# Patient Record
Sex: MALE | Race: WHITE | Employment: OTHER | RURAL
[De-identification: names, ages, dates, MRNs, and addresses within clinical notes are randomized per-mention and may not be internally consistent; named-entity substitution may affect disease eponyms.]

---

## 2017-01-19 RX ORDER — ALPRAZOLAM 2 MG/1
2 TABLET ORAL 2 TIMES DAILY PRN
Qty: 60 TABLET | Refills: 0 | Status: SHIPPED | OUTPATIENT
Start: 2017-01-19 | End: 2017-02-17 | Stop reason: SDUPTHER

## 2017-01-25 DIAGNOSIS — F41.9 ANXIETY: ICD-10-CM

## 2017-01-25 RX ORDER — PRAVASTATIN SODIUM 80 MG/1
TABLET ORAL
Qty: 30 TABLET | Refills: 5 | Status: SHIPPED | OUTPATIENT
Start: 2017-01-25 | End: 2017-05-18 | Stop reason: SDUPTHER

## 2017-01-25 RX ORDER — PRAVASTATIN SODIUM 40 MG
TABLET ORAL
Qty: 30 TABLET | Refills: 5 | Status: SHIPPED | OUTPATIENT
Start: 2017-01-25 | End: 2017-01-25 | Stop reason: SDUPTHER

## 2017-02-08 ENCOUNTER — OFFICE VISIT (OUTPATIENT)
Dept: PRIMARY CARE CLINIC | Age: 67
End: 2017-02-08
Payer: MEDICARE

## 2017-02-08 VITALS
HEART RATE: 114 BPM | SYSTOLIC BLOOD PRESSURE: 130 MMHG | OXYGEN SATURATION: 93 % | DIASTOLIC BLOOD PRESSURE: 70 MMHG | BODY MASS INDEX: 23.56 KG/M2 | WEIGHT: 150.4 LBS

## 2017-02-08 DIAGNOSIS — M54.9 CHRONIC BACK PAIN, UNSPECIFIED BACK LOCATION, UNSPECIFIED BACK PAIN LATERALITY: ICD-10-CM

## 2017-02-08 DIAGNOSIS — K21.9 GASTROESOPHAGEAL REFLUX DISEASE WITHOUT ESOPHAGITIS: ICD-10-CM

## 2017-02-08 DIAGNOSIS — F41.9 ANXIETY: Primary | ICD-10-CM

## 2017-02-08 DIAGNOSIS — E78.5 HYPERLIPIDEMIA, UNSPECIFIED HYPERLIPIDEMIA TYPE: ICD-10-CM

## 2017-02-08 DIAGNOSIS — G89.29 CHRONIC BACK PAIN, UNSPECIFIED BACK LOCATION, UNSPECIFIED BACK PAIN LATERALITY: ICD-10-CM

## 2017-02-08 PROCEDURE — G8427 DOCREV CUR MEDS BY ELIG CLIN: HCPCS | Performed by: NURSE PRACTITIONER

## 2017-02-08 PROCEDURE — G8598 ASA/ANTIPLAT THER USED: HCPCS | Performed by: NURSE PRACTITIONER

## 2017-02-08 PROCEDURE — 3017F COLORECTAL CA SCREEN DOC REV: CPT | Performed by: NURSE PRACTITIONER

## 2017-02-08 PROCEDURE — 99213 OFFICE O/P EST LOW 20 MIN: CPT | Performed by: NURSE PRACTITIONER

## 2017-02-08 PROCEDURE — G8420 CALC BMI NORM PARAMETERS: HCPCS | Performed by: NURSE PRACTITIONER

## 2017-02-08 PROCEDURE — 1123F ACP DISCUSS/DSCN MKR DOCD: CPT | Performed by: NURSE PRACTITIONER

## 2017-02-08 PROCEDURE — G8484 FLU IMMUNIZE NO ADMIN: HCPCS | Performed by: NURSE PRACTITIONER

## 2017-02-08 PROCEDURE — 1036F TOBACCO NON-USER: CPT | Performed by: NURSE PRACTITIONER

## 2017-02-08 PROCEDURE — 4040F PNEUMOC VAC/ADMIN/RCVD: CPT | Performed by: NURSE PRACTITIONER

## 2017-02-08 RX ORDER — TRAZODONE HYDROCHLORIDE 100 MG/1
TABLET ORAL
Qty: 60 TABLET | Refills: 5 | Status: SHIPPED | OUTPATIENT
Start: 2017-02-08 | End: 2017-03-17 | Stop reason: SDUPTHER

## 2017-02-08 RX ORDER — PAROXETINE HYDROCHLORIDE 20 MG/1
20 TABLET, FILM COATED ORAL DAILY
Qty: 30 TABLET | Refills: 5 | Status: SHIPPED | OUTPATIENT
Start: 2017-02-08 | End: 2017-03-08 | Stop reason: SDUPTHER

## 2017-02-08 RX ORDER — GABAPENTIN 800 MG/1
800 TABLET ORAL 4 TIMES DAILY
Qty: 120 TABLET | Refills: 5 | Status: SHIPPED | OUTPATIENT
Start: 2017-02-08 | End: 2017-06-05 | Stop reason: SDUPTHER

## 2017-02-08 ASSESSMENT — ENCOUNTER SYMPTOMS
GASTROINTESTINAL NEGATIVE: 1
EYES NEGATIVE: 1
RESPIRATORY NEGATIVE: 1

## 2017-02-17 RX ORDER — ALPRAZOLAM 2 MG/1
2 TABLET ORAL 2 TIMES DAILY PRN
Qty: 60 TABLET | Refills: 0 | Status: SHIPPED | OUTPATIENT
Start: 2017-02-17 | End: 2017-03-17 | Stop reason: SDUPTHER

## 2017-02-26 ASSESSMENT — ENCOUNTER SYMPTOMS
SORE THROAT: 0
EYE PAIN: 0
SHORTNESS OF BREATH: 0
NAUSEA: 0
COUGH: 0
BACK PAIN: 1
ABDOMINAL PAIN: 0
VOMITING: 0

## 2017-03-08 ENCOUNTER — OFFICE VISIT (OUTPATIENT)
Dept: PRIMARY CARE CLINIC | Age: 67
End: 2017-03-08
Payer: MEDICARE

## 2017-03-08 VITALS
SYSTOLIC BLOOD PRESSURE: 124 MMHG | BODY MASS INDEX: 23.18 KG/M2 | HEART RATE: 104 BPM | OXYGEN SATURATION: 96 % | WEIGHT: 148 LBS | DIASTOLIC BLOOD PRESSURE: 70 MMHG

## 2017-03-08 DIAGNOSIS — R19.7 DIARRHEA, UNSPECIFIED TYPE: ICD-10-CM

## 2017-03-08 DIAGNOSIS — F41.9 ANXIETY: Primary | ICD-10-CM

## 2017-03-08 DIAGNOSIS — J20.9 ACUTE BRONCHITIS, UNSPECIFIED ORGANISM: ICD-10-CM

## 2017-03-08 PROCEDURE — G8598 ASA/ANTIPLAT THER USED: HCPCS | Performed by: NURSE PRACTITIONER

## 2017-03-08 PROCEDURE — 1036F TOBACCO NON-USER: CPT | Performed by: NURSE PRACTITIONER

## 2017-03-08 PROCEDURE — 99213 OFFICE O/P EST LOW 20 MIN: CPT | Performed by: NURSE PRACTITIONER

## 2017-03-08 PROCEDURE — G8484 FLU IMMUNIZE NO ADMIN: HCPCS | Performed by: NURSE PRACTITIONER

## 2017-03-08 PROCEDURE — 4040F PNEUMOC VAC/ADMIN/RCVD: CPT | Performed by: NURSE PRACTITIONER

## 2017-03-08 PROCEDURE — 1123F ACP DISCUSS/DSCN MKR DOCD: CPT | Performed by: NURSE PRACTITIONER

## 2017-03-08 PROCEDURE — G8420 CALC BMI NORM PARAMETERS: HCPCS | Performed by: NURSE PRACTITIONER

## 2017-03-08 PROCEDURE — G8427 DOCREV CUR MEDS BY ELIG CLIN: HCPCS | Performed by: NURSE PRACTITIONER

## 2017-03-08 PROCEDURE — 3017F COLORECTAL CA SCREEN DOC REV: CPT | Performed by: NURSE PRACTITIONER

## 2017-03-08 RX ORDER — CIPROFLOXACIN 500 MG/1
500 TABLET, FILM COATED ORAL 2 TIMES DAILY
Qty: 20 TABLET | Refills: 0 | Status: SHIPPED | OUTPATIENT
Start: 2017-03-08 | End: 2017-03-17 | Stop reason: ALTCHOICE

## 2017-03-08 RX ORDER — PAROXETINE HYDROCHLORIDE 20 MG/1
20 TABLET, FILM COATED ORAL DAILY
Qty: 30 TABLET | Refills: 5 | Status: SHIPPED | OUTPATIENT
Start: 2017-03-08 | End: 2017-08-02 | Stop reason: SDUPTHER

## 2017-03-08 RX ORDER — ALBUTEROL SULFATE 90 UG/1
AEROSOL, METERED RESPIRATORY (INHALATION)
Qty: 8.5 G | Refills: 5 | Status: SHIPPED | OUTPATIENT
Start: 2017-03-08 | End: 2017-08-02 | Stop reason: SDUPTHER

## 2017-03-08 RX ORDER — BENZONATATE 200 MG/1
200 CAPSULE ORAL 3 TIMES DAILY PRN
Qty: 30 CAPSULE | Refills: 2 | Status: SHIPPED | OUTPATIENT
Start: 2017-03-08 | End: 2017-03-15

## 2017-03-08 ASSESSMENT — ENCOUNTER SYMPTOMS
SHORTNESS OF BREATH: 0
COUGH: 1
WHEEZING: 1
DIARRHEA: 1
EYES NEGATIVE: 1
VOMITING: 0
SORE THROAT: 0
EYE PAIN: 0
NAUSEA: 0
ABDOMINAL PAIN: 0

## 2017-03-17 ENCOUNTER — OFFICE VISIT (OUTPATIENT)
Dept: PRIMARY CARE CLINIC | Age: 67
End: 2017-03-17
Payer: MEDICARE

## 2017-03-17 VITALS
HEART RATE: 100 BPM | WEIGHT: 147.4 LBS | OXYGEN SATURATION: 98 % | RESPIRATION RATE: 20 BRPM | DIASTOLIC BLOOD PRESSURE: 70 MMHG | HEIGHT: 67 IN | SYSTOLIC BLOOD PRESSURE: 120 MMHG | BODY MASS INDEX: 23.13 KG/M2

## 2017-03-17 DIAGNOSIS — F41.9 ANXIETY: Primary | ICD-10-CM

## 2017-03-17 DIAGNOSIS — G47.00 INSOMNIA, UNSPECIFIED TYPE: ICD-10-CM

## 2017-03-17 PROCEDURE — 1123F ACP DISCUSS/DSCN MKR DOCD: CPT | Performed by: NURSE PRACTITIONER

## 2017-03-17 PROCEDURE — G8484 FLU IMMUNIZE NO ADMIN: HCPCS | Performed by: NURSE PRACTITIONER

## 2017-03-17 PROCEDURE — 4040F PNEUMOC VAC/ADMIN/RCVD: CPT | Performed by: NURSE PRACTITIONER

## 2017-03-17 PROCEDURE — G8427 DOCREV CUR MEDS BY ELIG CLIN: HCPCS | Performed by: NURSE PRACTITIONER

## 2017-03-17 PROCEDURE — 99213 OFFICE O/P EST LOW 20 MIN: CPT | Performed by: NURSE PRACTITIONER

## 2017-03-17 PROCEDURE — 1036F TOBACCO NON-USER: CPT | Performed by: NURSE PRACTITIONER

## 2017-03-17 PROCEDURE — 3017F COLORECTAL CA SCREEN DOC REV: CPT | Performed by: NURSE PRACTITIONER

## 2017-03-17 PROCEDURE — G8420 CALC BMI NORM PARAMETERS: HCPCS | Performed by: NURSE PRACTITIONER

## 2017-03-17 PROCEDURE — G8598 ASA/ANTIPLAT THER USED: HCPCS | Performed by: NURSE PRACTITIONER

## 2017-03-17 RX ORDER — ALPRAZOLAM 2 MG/1
2 TABLET ORAL 2 TIMES DAILY PRN
Qty: 60 TABLET | Refills: 0 | Status: SHIPPED | OUTPATIENT
Start: 2017-03-17 | End: 2017-04-13 | Stop reason: SDUPTHER

## 2017-03-17 RX ORDER — TRAZODONE HYDROCHLORIDE 50 MG/1
50 TABLET ORAL NIGHTLY
Qty: 14 TABLET | Refills: 0 | Status: SHIPPED | OUTPATIENT
Start: 2017-03-17 | End: 2017-05-18 | Stop reason: ALTCHOICE

## 2017-03-17 RX ORDER — BENZONATATE 200 MG/1
CAPSULE ORAL
COMMUNITY
Start: 2017-03-08 | End: 2017-03-17 | Stop reason: ALTCHOICE

## 2017-03-20 ASSESSMENT — ENCOUNTER SYMPTOMS
COUGH: 0
DIARRHEA: 0
SHORTNESS OF BREATH: 0
SORE THROAT: 0
NAUSEA: 0
VOMITING: 0
ABDOMINAL PAIN: 0
EYE PAIN: 0

## 2017-04-13 RX ORDER — ALPRAZOLAM 2 MG/1
2 TABLET ORAL 2 TIMES DAILY PRN
Qty: 60 TABLET | Refills: 0 | Status: SHIPPED | OUTPATIENT
Start: 2017-04-13 | End: 2017-05-11 | Stop reason: SDUPTHER

## 2017-05-11 RX ORDER — ALPRAZOLAM 2 MG/1
TABLET ORAL
Qty: 60 TABLET | Refills: 0 | Status: SHIPPED | OUTPATIENT
Start: 2017-05-11 | End: 2017-05-12 | Stop reason: SDUPTHER

## 2017-05-12 RX ORDER — ALPRAZOLAM 2 MG/1
TABLET ORAL
Qty: 60 TABLET | Refills: 0 | Status: SHIPPED | OUTPATIENT
Start: 2017-05-12 | End: 2017-06-12 | Stop reason: SDUPTHER

## 2017-05-18 ENCOUNTER — TELEPHONE (OUTPATIENT)
Dept: PRIMARY CARE CLINIC | Age: 67
End: 2017-05-18

## 2017-05-18 ENCOUNTER — HOSPITAL ENCOUNTER (OUTPATIENT)
Dept: OTHER | Age: 67
Discharge: HOME | End: 2017-05-18
Attending: NURSE PRACTITIONER | Admitting: NURSE PRACTITIONER

## 2017-05-18 ENCOUNTER — OFFICE VISIT (OUTPATIENT)
Dept: PRIMARY CARE CLINIC | Age: 67
End: 2017-05-18
Payer: MEDICARE

## 2017-05-18 VITALS
SYSTOLIC BLOOD PRESSURE: 110 MMHG | HEART RATE: 91 BPM | OXYGEN SATURATION: 97 % | WEIGHT: 155 LBS | DIASTOLIC BLOOD PRESSURE: 60 MMHG | BODY MASS INDEX: 24.28 KG/M2

## 2017-05-18 DIAGNOSIS — E11.9 TYPE 2 DIABETES MELLITUS WITHOUT COMPLICATION, WITHOUT LONG-TERM CURRENT USE OF INSULIN (HCC): ICD-10-CM

## 2017-05-18 DIAGNOSIS — F41.9 ANXIETY: ICD-10-CM

## 2017-05-18 DIAGNOSIS — E78.5 HYPERLIPIDEMIA, UNSPECIFIED HYPERLIPIDEMIA TYPE: ICD-10-CM

## 2017-05-18 DIAGNOSIS — R53.1 WEAKNESS: ICD-10-CM

## 2017-05-18 DIAGNOSIS — E78.5 HYPERLIPIDEMIA, UNSPECIFIED HYPERLIPIDEMIA TYPE: Primary | ICD-10-CM

## 2017-05-18 DIAGNOSIS — J01.90 ACUTE SINUSITIS, RECURRENCE NOT SPECIFIED, UNSPECIFIED LOCATION: ICD-10-CM

## 2017-05-18 LAB
A/G RATIO: 1.7 (ref 0.8–2)
ALBUMIN SERPL-MCNC: 4 G/DL (ref 3.4–4.8)
ALP BLD-CCNC: 89 U/L (ref 25–100)
ALT SERPL-CCNC: 27 U/L (ref 4–36)
ANION GAP SERPL CALCULATED.3IONS-SCNC: 11 MMOL/L (ref 3–16)
AST SERPL-CCNC: 26 U/L (ref 8–33)
BASOPHILS ABSOLUTE: 0 K/UL (ref 0–0.1)
BASOPHILS RELATIVE PERCENT: 0.8 %
BILIRUB SERPL-MCNC: <0.2 MG/DL (ref 0.3–1.2)
BUN BLDV-MCNC: 7 MG/DL (ref 6–20)
CALCIUM SERPL-MCNC: 9.4 MG/DL (ref 8.5–10.5)
CHLORIDE BLD-SCNC: 103 MMOL/L (ref 98–107)
CHOLESTEROL, TOTAL: 167 MG/DL (ref 0–200)
CO2: 30 MMOL/L (ref 20–30)
CREAT SERPL-MCNC: 0.9 MG/DL (ref 0.4–1.2)
EOSINOPHILS ABSOLUTE: 0.1 K/UL (ref 0–0.4)
EOSINOPHILS RELATIVE PERCENT: 2.3 %
GFR AFRICAN AMERICAN: >59
GFR NON-AFRICAN AMERICAN: >59
GLOBULIN: 2.4 G/DL
GLUCOSE BLD-MCNC: 132 MG/DL (ref 74–106)
HBA1C MFR BLD: 6 %
HCT VFR BLD CALC: 51.2 % (ref 40–54)
HDLC SERPL-MCNC: 49 MG/DL (ref 40–60)
HEMOGLOBIN: 17.3 G/DL (ref 13–18)
LDL CHOLESTEROL CALCULATED: 100 MG/DL
LYMPHOCYTES ABSOLUTE: 1.3 K/UL (ref 1.5–4)
LYMPHOCYTES RELATIVE PERCENT: 27.4 % (ref 20–40)
MCH RBC QN AUTO: 32.5 PG (ref 27–32)
MCHC RBC AUTO-ENTMCNC: 33.8 G/DL (ref 31–35)
MCV RBC AUTO: 96.2 FL (ref 80–100)
MONOCYTES ABSOLUTE: 0.4 K/UL (ref 0.2–0.8)
MONOCYTES RELATIVE PERCENT: 8.9 % (ref 3–10)
NEUTROPHILS ABSOLUTE: 2.9 K/UL (ref 2–7.5)
NEUTROPHILS RELATIVE PERCENT: 60.6 %
PDW BLD-RTO: 14.7 % (ref 11–16)
PLATELET # BLD: 186 K/UL (ref 150–400)
PMV BLD AUTO: 9.8 FL (ref 6–10)
POTASSIUM SERPL-SCNC: 4.6 MMOL/L (ref 3.4–5.1)
RBC # BLD: 5.32 M/UL (ref 4.5–6)
SODIUM BLD-SCNC: 144 MMOL/L (ref 136–145)
TOTAL PROTEIN: 6.4 G/DL (ref 6.4–8.3)
TRIGL SERPL-MCNC: 91 MG/DL (ref 0–249)
VLDLC SERPL CALC-MCNC: 18 MG/DL
WBC # BLD: 4.7 K/UL (ref 4–11)

## 2017-05-18 PROCEDURE — 1123F ACP DISCUSS/DSCN MKR DOCD: CPT | Performed by: NURSE PRACTITIONER

## 2017-05-18 PROCEDURE — 99213 OFFICE O/P EST LOW 20 MIN: CPT | Performed by: NURSE PRACTITIONER

## 2017-05-18 PROCEDURE — G8598 ASA/ANTIPLAT THER USED: HCPCS | Performed by: NURSE PRACTITIONER

## 2017-05-18 PROCEDURE — G8427 DOCREV CUR MEDS BY ELIG CLIN: HCPCS | Performed by: NURSE PRACTITIONER

## 2017-05-18 PROCEDURE — 3044F HG A1C LEVEL LT 7.0%: CPT | Performed by: NURSE PRACTITIONER

## 2017-05-18 PROCEDURE — 4040F PNEUMOC VAC/ADMIN/RCVD: CPT | Performed by: NURSE PRACTITIONER

## 2017-05-18 PROCEDURE — 1036F TOBACCO NON-USER: CPT | Performed by: NURSE PRACTITIONER

## 2017-05-18 PROCEDURE — 3017F COLORECTAL CA SCREEN DOC REV: CPT | Performed by: NURSE PRACTITIONER

## 2017-05-18 PROCEDURE — G8420 CALC BMI NORM PARAMETERS: HCPCS | Performed by: NURSE PRACTITIONER

## 2017-05-18 RX ORDER — PRAVASTATIN SODIUM 80 MG/1
TABLET ORAL
Qty: 30 TABLET | Refills: 5 | Status: SHIPPED | OUTPATIENT
Start: 2017-05-18 | End: 2019-01-22 | Stop reason: ALTCHOICE

## 2017-05-18 RX ORDER — DOXYCYCLINE HYCLATE 100 MG
100 TABLET ORAL 2 TIMES DAILY
Qty: 20 TABLET | Refills: 0 | Status: SHIPPED | OUTPATIENT
Start: 2017-05-18 | End: 2017-05-28

## 2017-05-18 RX ORDER — GLUCOSAMINE HCL/CHONDROITIN SU 500-400 MG
1 CAPSULE ORAL DAILY
Qty: 50 STRIP | Refills: 5 | Status: SHIPPED | OUTPATIENT
Start: 2017-05-18 | End: 2017-08-17 | Stop reason: SDUPTHER

## 2017-05-18 RX ORDER — GLUCOSAMINE HCL/CHONDROITIN SU 500-400 MG
1 CAPSULE ORAL DAILY
Qty: 50 STRIP | Refills: 5 | Status: SHIPPED | OUTPATIENT
Start: 2017-05-18 | End: 2017-05-18 | Stop reason: SDUPTHER

## 2017-05-18 ASSESSMENT — ENCOUNTER SYMPTOMS
RESPIRATORY NEGATIVE: 1
SINUS PRESSURE: 1
EYES NEGATIVE: 1
GASTROINTESTINAL NEGATIVE: 1

## 2017-05-18 ASSESSMENT — PATIENT HEALTH QUESTIONNAIRE - PHQ9
2. FEELING DOWN, DEPRESSED OR HOPELESS: 1
SUM OF ALL RESPONSES TO PHQ QUESTIONS 1-9: 2
SUM OF ALL RESPONSES TO PHQ9 QUESTIONS 1 & 2: 2
1. LITTLE INTEREST OR PLEASURE IN DOING THINGS: 1

## 2017-05-30 ASSESSMENT — ENCOUNTER SYMPTOMS
NAUSEA: 0
SORE THROAT: 0
COUGH: 0
SHORTNESS OF BREATH: 0
ABDOMINAL PAIN: 0
EYE PAIN: 0
VOMITING: 0

## 2017-06-05 RX ORDER — GABAPENTIN 800 MG/1
TABLET ORAL
Qty: 120 TABLET | Refills: 5 | Status: SHIPPED | OUTPATIENT
Start: 2017-06-05 | End: 2017-07-27 | Stop reason: SDUPTHER

## 2017-06-08 ENCOUNTER — OFFICE VISIT (OUTPATIENT)
Dept: CARDIOLOGY | Facility: CLINIC | Age: 67
End: 2017-06-08

## 2017-06-08 VITALS
BODY MASS INDEX: 24.64 KG/M2 | WEIGHT: 157 LBS | HEIGHT: 67 IN | SYSTOLIC BLOOD PRESSURE: 134 MMHG | DIASTOLIC BLOOD PRESSURE: 70 MMHG | HEART RATE: 80 BPM

## 2017-06-08 DIAGNOSIS — I25.10 CORONARY ARTERY DISEASE INVOLVING NATIVE CORONARY ARTERY OF NATIVE HEART WITHOUT ANGINA PECTORIS: Primary | ICD-10-CM

## 2017-06-08 DIAGNOSIS — I77.9 BILATERAL CAROTID ARTERY DISEASE (HCC): ICD-10-CM

## 2017-06-08 PROCEDURE — 99213 OFFICE O/P EST LOW 20 MIN: CPT | Performed by: INTERNAL MEDICINE

## 2017-06-08 RX ORDER — HYDROCODONE BITARTRATE AND ACETAMINOPHEN 10; 325 MG/1; MG/1
1 TABLET ORAL 4 TIMES DAILY PRN
COMMUNITY

## 2017-06-08 RX ORDER — NITROGLYCERIN 0.4 MG/1
TABLET SUBLINGUAL
Qty: 100 TABLET | Refills: 11 | Status: SHIPPED | OUTPATIENT
Start: 2017-06-08 | End: 2020-05-19 | Stop reason: SDUPTHER

## 2017-06-08 RX ORDER — PAROXETINE HYDROCHLORIDE 20 MG/1
20 TABLET, FILM COATED ORAL EVERY MORNING
COMMUNITY
End: 2018-09-27

## 2017-06-08 RX ORDER — ASPIRIN 325 MG
325 TABLET ORAL DAILY
Qty: 30 TABLET | Refills: 11 | COMMUNITY
End: 2019-01-10

## 2017-06-08 RX ORDER — GABAPENTIN 800 MG/1
800 TABLET ORAL 4 TIMES DAILY
COMMUNITY
End: 2019-01-10

## 2017-06-08 RX ORDER — ALPRAZOLAM 2 MG/1
2 TABLET ORAL 2 TIMES DAILY PRN
COMMUNITY
End: 2019-01-10

## 2017-06-08 NOTE — PROGRESS NOTES
"    Subjective:     Encounter Date:06/08/2017      Patient ID: Geronimo Pizano is a 66 y.o. male.    Chief Complaint:Coronary artery disease  HPI  This is a 66-year-old male patient with known coronary artery disease who presents to cardiology clinic for routine follow-up.  The patient indicates that he has had no recurrence of angina.  He reports having one to 2 episodes of atypical chest discomfort over the last 6 months which she describes as a sharp pain in his left upper quadrant below his left lateral rib cage.  The discomfort has a sharp stabbing quality and occurred at rest.  There was no pleuritic component.  It did not radiate.  It only lasted approximately one to 2 seconds.  The patient reports having shortness of breath both at rest and with activity which is unchanged in frequency duration and intensity.  Unfortunately the patient continues to smoke.  He has no orthopnea PND or lower extremity edema.  There is no dizziness palpitations or syncope.  The patient has no history of stroke or TIA.  He has no focal numbness weakness or paralysis.  There is no dizziness or unsteadiness of gait.  He has had no visual changes or change in his speech or ability to comprehend.  He has had no swallowing difficulties.  The patient had a repeat bilateral carotid artery ultrasound duplex Doppler which suggested a 50% right internal carotid artery stenosis and a 50-60% left internal carotid artery stenosis.  The patient also had an echocardiogram which showed a normal ejection fraction with normal regional wall motion.  There were no significant valvular abnormalities or evidence of pericardial effusion.  Estimated right ventricular systolic pressures were normal.  The patient was also ordered to have a vasodilator nuclear stress test but adamantly refuses to have any further nuclear stress testing.  He indicates that his last nuclear stress test \"nearly killed me\" and that \"I was not right for 3 days\".  The " remainder of his past medical history, family history and social history is unchanged compared to his last visit.  The following portions of the patient's history were reviewed and updated as appropriate: allergies, current medications, past family history, past medical history, past social history, past surgical history and problem  Review of Systems   Constitution: Negative for chills, diaphoresis, fever, weakness, malaise/fatigue, night sweats, weight gain and weight loss.   HENT: Negative for ear discharge, hearing loss, hoarse voice and nosebleeds.    Eyes: Negative for discharge, double vision, pain and photophobia.   Cardiovascular: Positive for chest pain and dyspnea on exertion. Negative for claudication, cyanosis, irregular heartbeat, leg swelling, near-syncope, orthopnea, palpitations, paroxysmal nocturnal dyspnea and syncope.   Respiratory: Positive for shortness of breath. Negative for cough, hemoptysis, sputum production and wheezing.    Endocrine: Negative for cold intolerance, heat intolerance, polydipsia, polyphagia and polyuria.   Hematologic/Lymphatic: Negative for adenopathy and bleeding problem. Does not bruise/bleed easily.   Skin: Negative for color change, flushing, itching and rash.   Musculoskeletal: Negative for muscle cramps, muscle weakness, myalgias and stiffness.   Gastrointestinal: Negative for abdominal pain, diarrhea, hematemesis, hematochezia, nausea and vomiting.   Genitourinary: Negative for dysuria, frequency and nocturia.   Neurological: Negative for focal weakness, loss of balance, numbness, paresthesias and seizures.   Psychiatric/Behavioral: Negative for altered mental status, hallucinations and suicidal ideas.   Allergic/Immunologic: Negative for HIV exposure, hives and persistent infections.       Current Outpatient Prescriptions:   •  Albuterol Sulfate (PROAIR HFA IN), Inhale., Disp: , Rfl:   •  ALPRAZolam (XANAX) 2 MG tablet, Take 2 mg by mouth 2 (Two) Times a Day As  "Needed for Anxiety., Disp: , Rfl:   •  gabapentin (NEURONTIN) 800 MG tablet, Take 800 mg by mouth 4 (Four) Times a Day., Disp: , Rfl:   •  HYDROcodone-acetaminophen (NORCO)  MG per tablet, Take 1 tablet by mouth Every 6 (Six) Hours As Needed for Moderate Pain (4-6)., Disp: , Rfl:   •  PARoxetine (PAXIL) 20 MG tablet, Take 20 mg by mouth Every Morning., Disp: , Rfl:   •  pravastatin (PRAVACHOL) 40 MG tablet, Take 1 tablet by mouth Daily. (Patient taking differently: Take 80 mg by mouth Daily.), Disp: 90 tablet, Rfl: 4  •  aspirin 81 MG tablet, Take 1 tablet by mouth Daily., Disp: 30 tablet, Rfl: 11  •  nitroglycerin (NITROSTAT) 0.4 MG SL tablet, 1 under the tongue as needed for angina, may repeat q5mins for up three doses, Disp: 100 tablet, Rfl: 11     Objective:     Physical Exam   Constitutional: He is oriented to person, place, and time. He appears well-developed and well-nourished.   HENT:   Head: Normocephalic and atraumatic.   Eyes: Conjunctivae are normal. No scleral icterus.   Cardiovascular: Normal rate, regular rhythm, normal heart sounds and intact distal pulses.  Exam reveals no gallop and no friction rub.    No murmur heard.  Pulses:       Carotid pulses are 2+ on the right side with bruit, and 2+ on the left side with bruit.  Pulmonary/Chest: Effort normal and breath sounds normal. No respiratory distress.   Abdominal: Soft. Bowel sounds are normal. There is no tenderness.   Musculoskeletal: He exhibits no edema.   Neurological: He is alert and oriented to person, place, and time.   Skin: Skin is warm and dry.   Psychiatric: He has a normal mood and affect. His behavior is normal.     Blood pressure 134/70, pulse 80, height 67\" (170.2 cm), weight 157 lb (71.2 kg).   Lab Review:       Assessment:         1. Coronary artery disease involving native coronary artery of native heart without angina pectoris  The patient appears to be angina free.  He has had some atypical left upper quadrant pain that " does not appear to be cardiac in etiology.  Overall his dyspnea is stable and most likely due to his underlying COPD with ongoing tobacco abuse.    2. Bilateral carotid artery disease  The patient has intermediate grade bilateral carotid artery stenosis with a 50% right internal carotid artery stenosis and a 50-60% left internal carotid artery stenosis.  He has no history of stroke or TIA and is asymptomatic from a neurologic standpoint.  There is no compelling indication for prophylactic revascularization in this subset.  However the patient is escalating his risk of a future event by continuing to smoke.    Procedures     Plan:       The patient has been counseled again regarding the essential need to discontinue cigarette smoking.  No additional cardiovascular testing is indicated at this time.  The patient indicates that under no circumstances was he ever agree to have another vasodilator nuclear stress test.  Given his overall poor effort tolerance and lung disease he is unable to do treadmill exercise stress testing.  I have recommended that the patient be started on an enteric-coated baby aspirin once per day.  I have given him a prescription for sublingual nitroglycerin to keep on hand if he has any recurrent anginal type chest discomfort.  He will follow-up in our office in 6 months.

## 2017-06-13 RX ORDER — GABAPENTIN 800 MG/1
TABLET ORAL
Qty: 120 TABLET | Refills: 5 | Status: SHIPPED | OUTPATIENT
Start: 2017-06-13 | End: 2017-12-04 | Stop reason: SDUPTHER

## 2017-06-13 RX ORDER — ALPRAZOLAM 2 MG/1
TABLET ORAL
Qty: 60 TABLET | Refills: 0 | Status: SHIPPED | OUTPATIENT
Start: 2017-06-13 | End: 2017-07-12 | Stop reason: SDUPTHER

## 2017-07-12 RX ORDER — ALPRAZOLAM 2 MG/1
TABLET ORAL
Qty: 60 TABLET | Refills: 0 | Status: SHIPPED | OUTPATIENT
Start: 2017-07-12 | End: 2017-08-10 | Stop reason: SDUPTHER

## 2017-07-27 ENCOUNTER — OFFICE VISIT (OUTPATIENT)
Dept: PRIMARY CARE CLINIC | Age: 67
End: 2017-07-27
Payer: MEDICARE

## 2017-07-27 VITALS
HEIGHT: 67 IN | WEIGHT: 162 LBS | BODY MASS INDEX: 25.43 KG/M2 | DIASTOLIC BLOOD PRESSURE: 74 MMHG | TEMPERATURE: 98 F | RESPIRATION RATE: 20 BRPM | SYSTOLIC BLOOD PRESSURE: 136 MMHG | OXYGEN SATURATION: 96 % | HEART RATE: 85 BPM

## 2017-07-27 DIAGNOSIS — R06.2 WHEEZING: ICD-10-CM

## 2017-07-27 DIAGNOSIS — B96.89 ACUTE BACTERIAL SINUSITIS: Primary | ICD-10-CM

## 2017-07-27 DIAGNOSIS — J01.90 ACUTE BACTERIAL SINUSITIS: Primary | ICD-10-CM

## 2017-07-27 PROBLEM — J32.9 SINUSITIS: Status: ACTIVE | Noted: 2017-07-27

## 2017-07-27 PROCEDURE — 1123F ACP DISCUSS/DSCN MKR DOCD: CPT | Performed by: FAMILY MEDICINE

## 2017-07-27 PROCEDURE — G8427 DOCREV CUR MEDS BY ELIG CLIN: HCPCS | Performed by: FAMILY MEDICINE

## 2017-07-27 PROCEDURE — 1036F TOBACCO NON-USER: CPT | Performed by: FAMILY MEDICINE

## 2017-07-27 PROCEDURE — 4040F PNEUMOC VAC/ADMIN/RCVD: CPT | Performed by: FAMILY MEDICINE

## 2017-07-27 PROCEDURE — G8598 ASA/ANTIPLAT THER USED: HCPCS | Performed by: FAMILY MEDICINE

## 2017-07-27 PROCEDURE — 99213 OFFICE O/P EST LOW 20 MIN: CPT | Performed by: FAMILY MEDICINE

## 2017-07-27 PROCEDURE — G8419 CALC BMI OUT NRM PARAM NOF/U: HCPCS | Performed by: FAMILY MEDICINE

## 2017-07-27 PROCEDURE — 3017F COLORECTAL CA SCREEN DOC REV: CPT | Performed by: FAMILY MEDICINE

## 2017-07-27 RX ORDER — FLUTICASONE PROPIONATE 50 MCG
1 SPRAY, SUSPENSION (ML) NASAL DAILY
Qty: 1 BOTTLE | Refills: 3 | Status: SHIPPED | OUTPATIENT
Start: 2017-07-27 | End: 2018-11-19 | Stop reason: ALTCHOICE

## 2017-07-27 RX ORDER — CEFDINIR 300 MG/1
300 CAPSULE ORAL 2 TIMES DAILY
Qty: 20 CAPSULE | Refills: 0 | Status: SHIPPED | OUTPATIENT
Start: 2017-07-27 | End: 2017-08-06

## 2017-07-27 RX ORDER — CYCLOBENZAPRINE HCL 10 MG
10 TABLET ORAL DAILY
COMMUNITY

## 2017-07-27 ASSESSMENT — ENCOUNTER SYMPTOMS
RHINORRHEA: 0
EYE ITCHING: 0
VOMITING: 0
WHEEZING: 1
SORE THROAT: 0
EYE REDNESS: 0
SHORTNESS OF BREATH: 1
EYE DISCHARGE: 0
DIARRHEA: 0
NAUSEA: 0
ABDOMINAL PAIN: 0
COUGH: 1
CONSTIPATION: 0

## 2017-08-02 DIAGNOSIS — F41.9 ANXIETY: ICD-10-CM

## 2017-08-02 RX ORDER — PAROXETINE HYDROCHLORIDE 20 MG/1
TABLET, FILM COATED ORAL
Qty: 30 TABLET | Refills: 5 | Status: SHIPPED | OUTPATIENT
Start: 2017-08-02 | End: 2018-01-25 | Stop reason: SDUPTHER

## 2017-08-02 RX ORDER — PRAVASTATIN SODIUM 40 MG
TABLET ORAL
Qty: 45 TABLET | Refills: 5 | Status: SHIPPED | OUTPATIENT
Start: 2017-08-02 | End: 2018-02-26 | Stop reason: SDUPTHER

## 2017-08-10 RX ORDER — ALPRAZOLAM 2 MG/1
TABLET ORAL
Qty: 60 TABLET | Refills: 0 | Status: SHIPPED | OUTPATIENT
Start: 2017-08-10 | End: 2017-09-07 | Stop reason: SDUPTHER

## 2017-08-17 ENCOUNTER — OFFICE VISIT (OUTPATIENT)
Dept: PRIMARY CARE CLINIC | Age: 67
End: 2017-08-17
Payer: MEDICARE

## 2017-08-17 VITALS
OXYGEN SATURATION: 97 % | WEIGHT: 163 LBS | SYSTOLIC BLOOD PRESSURE: 120 MMHG | BODY MASS INDEX: 25.53 KG/M2 | DIASTOLIC BLOOD PRESSURE: 70 MMHG | HEART RATE: 90 BPM

## 2017-08-17 DIAGNOSIS — G89.29 CHRONIC BACK PAIN, UNSPECIFIED BACK LOCATION, UNSPECIFIED BACK PAIN LATERALITY: ICD-10-CM

## 2017-08-17 DIAGNOSIS — I25.119 CORONARY ARTERY DISEASE INVOLVING NATIVE HEART WITH ANGINA PECTORIS, UNSPECIFIED VESSEL OR LESION TYPE (HCC): ICD-10-CM

## 2017-08-17 DIAGNOSIS — E11.9 TYPE 2 DIABETES MELLITUS WITHOUT COMPLICATION, WITHOUT LONG-TERM CURRENT USE OF INSULIN (HCC): ICD-10-CM

## 2017-08-17 DIAGNOSIS — R06.02 SOB (SHORTNESS OF BREATH): Primary | ICD-10-CM

## 2017-08-17 DIAGNOSIS — M54.9 CHRONIC BACK PAIN, UNSPECIFIED BACK LOCATION, UNSPECIFIED BACK PAIN LATERALITY: ICD-10-CM

## 2017-08-17 DIAGNOSIS — F41.9 ANXIETY: ICD-10-CM

## 2017-08-17 LAB — HBA1C MFR BLD: 6.2 %

## 2017-08-17 PROCEDURE — G8427 DOCREV CUR MEDS BY ELIG CLIN: HCPCS | Performed by: NURSE PRACTITIONER

## 2017-08-17 PROCEDURE — 83036 HEMOGLOBIN GLYCOSYLATED A1C: CPT | Performed by: NURSE PRACTITIONER

## 2017-08-17 PROCEDURE — G8598 ASA/ANTIPLAT THER USED: HCPCS | Performed by: NURSE PRACTITIONER

## 2017-08-17 PROCEDURE — 1123F ACP DISCUSS/DSCN MKR DOCD: CPT | Performed by: NURSE PRACTITIONER

## 2017-08-17 PROCEDURE — G8419 CALC BMI OUT NRM PARAM NOF/U: HCPCS | Performed by: NURSE PRACTITIONER

## 2017-08-17 PROCEDURE — 99213 OFFICE O/P EST LOW 20 MIN: CPT | Performed by: NURSE PRACTITIONER

## 2017-08-17 PROCEDURE — 3017F COLORECTAL CA SCREEN DOC REV: CPT | Performed by: NURSE PRACTITIONER

## 2017-08-17 PROCEDURE — 1036F TOBACCO NON-USER: CPT | Performed by: NURSE PRACTITIONER

## 2017-08-17 PROCEDURE — 4040F PNEUMOC VAC/ADMIN/RCVD: CPT | Performed by: NURSE PRACTITIONER

## 2017-08-17 PROCEDURE — 3046F HEMOGLOBIN A1C LEVEL >9.0%: CPT | Performed by: NURSE PRACTITIONER

## 2017-08-17 RX ORDER — GLUCOSAMINE HCL/CHONDROITIN SU 500-400 MG
1 CAPSULE ORAL DAILY
Qty: 50 STRIP | Refills: 5 | Status: SHIPPED | OUTPATIENT
Start: 2017-08-17 | End: 2018-09-21 | Stop reason: SDUPTHER

## 2017-08-17 ASSESSMENT — ENCOUNTER SYMPTOMS
EYE PAIN: 0
SORE THROAT: 0
COUGH: 0
ABDOMINAL PAIN: 0
NAUSEA: 0
VOMITING: 0

## 2017-08-18 ENCOUNTER — HOSPITAL ENCOUNTER (OUTPATIENT)
Dept: OTHER | Age: 67
Discharge: HOME | End: 2017-08-18
Attending: NURSE PRACTITIONER | Admitting: NURSE PRACTITIONER

## 2017-08-18 DIAGNOSIS — R06.02 SOB (SHORTNESS OF BREATH): ICD-10-CM

## 2017-08-22 ENCOUNTER — TELEPHONE (OUTPATIENT)
Dept: PRIMARY CARE CLINIC | Age: 67
End: 2017-08-22

## 2017-08-27 ASSESSMENT — ENCOUNTER SYMPTOMS: SHORTNESS OF BREATH: 1

## 2017-09-07 RX ORDER — ALPRAZOLAM 2 MG/1
TABLET ORAL
Qty: 60 TABLET | Refills: 0 | Status: SHIPPED | OUTPATIENT
Start: 2017-09-07 | End: 2017-10-06 | Stop reason: SDUPTHER

## 2017-10-06 RX ORDER — ALPRAZOLAM 2 MG/1
TABLET ORAL
Qty: 60 TABLET | Refills: 0 | Status: SHIPPED | OUTPATIENT
Start: 2017-10-06 | End: 2017-11-03 | Stop reason: SDUPTHER

## 2017-11-03 RX ORDER — ALPRAZOLAM 2 MG/1
TABLET ORAL
Qty: 60 TABLET | Refills: 0 | Status: SHIPPED | OUTPATIENT
Start: 2017-11-03 | End: 2017-12-27 | Stop reason: SDUPTHER

## 2017-11-10 ENCOUNTER — OFFICE VISIT (OUTPATIENT)
Dept: PRIMARY CARE CLINIC | Age: 67
End: 2017-11-10
Payer: MEDICARE

## 2017-11-10 ENCOUNTER — NURSE ONLY (OUTPATIENT)
Dept: PRIMARY CARE CLINIC | Age: 67
End: 2017-11-10

## 2017-11-10 VITALS
SYSTOLIC BLOOD PRESSURE: 146 MMHG | TEMPERATURE: 98 F | OXYGEN SATURATION: 94 % | BODY MASS INDEX: 26.53 KG/M2 | HEART RATE: 87 BPM | WEIGHT: 169 LBS | DIASTOLIC BLOOD PRESSURE: 70 MMHG | RESPIRATION RATE: 16 BRPM | HEIGHT: 67 IN

## 2017-11-10 VITALS
RESPIRATION RATE: 16 BRPM | HEIGHT: 67 IN | SYSTOLIC BLOOD PRESSURE: 146 MMHG | TEMPERATURE: 98 F | OXYGEN SATURATION: 94 % | BODY MASS INDEX: 26.56 KG/M2 | WEIGHT: 169.2 LBS | HEART RATE: 87 BPM | DIASTOLIC BLOOD PRESSURE: 70 MMHG

## 2017-11-10 DIAGNOSIS — J40 BRONCHITIS: Primary | ICD-10-CM

## 2017-11-10 PROCEDURE — G8484 FLU IMMUNIZE NO ADMIN: HCPCS | Performed by: NURSE PRACTITIONER

## 2017-11-10 PROCEDURE — 99213 OFFICE O/P EST LOW 20 MIN: CPT | Performed by: NURSE PRACTITIONER

## 2017-11-10 PROCEDURE — G8427 DOCREV CUR MEDS BY ELIG CLIN: HCPCS | Performed by: NURSE PRACTITIONER

## 2017-11-10 PROCEDURE — 4040F PNEUMOC VAC/ADMIN/RCVD: CPT | Performed by: NURSE PRACTITIONER

## 2017-11-10 PROCEDURE — 3017F COLORECTAL CA SCREEN DOC REV: CPT | Performed by: NURSE PRACTITIONER

## 2017-11-10 PROCEDURE — 1036F TOBACCO NON-USER: CPT | Performed by: NURSE PRACTITIONER

## 2017-11-10 PROCEDURE — 1123F ACP DISCUSS/DSCN MKR DOCD: CPT | Performed by: NURSE PRACTITIONER

## 2017-11-10 PROCEDURE — G8598 ASA/ANTIPLAT THER USED: HCPCS | Performed by: NURSE PRACTITIONER

## 2017-11-10 PROCEDURE — G8417 CALC BMI ABV UP PARAM F/U: HCPCS | Performed by: NURSE PRACTITIONER

## 2017-11-10 RX ORDER — BROMPHENIRAMINE MALEATE, PSEUDOEPHEDRINE HYDROCHLORIDE, AND DEXTROMETHORPHAN HYDROBROMIDE 2; 30; 10 MG/5ML; MG/5ML; MG/5ML
5 SYRUP ORAL 4 TIMES DAILY PRN
Qty: 120 ML | Refills: 1 | Status: SHIPPED | OUTPATIENT
Start: 2017-11-10 | End: 2018-02-01 | Stop reason: ALTCHOICE

## 2017-11-10 RX ORDER — AMOXICILLIN 875 MG/1
875 TABLET, COATED ORAL 2 TIMES DAILY
Qty: 20 TABLET | Refills: 0 | Status: SHIPPED | OUTPATIENT
Start: 2017-11-10 | End: 2017-11-20

## 2017-11-10 ASSESSMENT — ENCOUNTER SYMPTOMS
COUGH: 1
BACK PAIN: 1
SORE THROAT: 1
WHEEZING: 1
SHORTNESS OF BREATH: 0

## 2017-11-17 ENCOUNTER — TELEPHONE (OUTPATIENT)
Dept: PRIMARY CARE CLINIC | Age: 67
End: 2017-11-17

## 2017-11-17 ENCOUNTER — HOSPITAL ENCOUNTER (OUTPATIENT)
Dept: OTHER | Age: 67
Discharge: HOME | End: 2017-11-17
Attending: NURSE PRACTITIONER | Admitting: NURSE PRACTITIONER

## 2017-11-17 ENCOUNTER — OFFICE VISIT (OUTPATIENT)
Dept: PRIMARY CARE CLINIC | Age: 67
End: 2017-11-17
Payer: MEDICARE

## 2017-11-17 VITALS
SYSTOLIC BLOOD PRESSURE: 120 MMHG | BODY MASS INDEX: 26.44 KG/M2 | WEIGHT: 168.8 LBS | OXYGEN SATURATION: 96 % | DIASTOLIC BLOOD PRESSURE: 60 MMHG | HEART RATE: 86 BPM

## 2017-11-17 DIAGNOSIS — J20.9 ACUTE BRONCHITIS, UNSPECIFIED ORGANISM: ICD-10-CM

## 2017-11-17 DIAGNOSIS — E78.5 HYPERLIPIDEMIA, UNSPECIFIED HYPERLIPIDEMIA TYPE: Primary | ICD-10-CM

## 2017-11-17 DIAGNOSIS — I25.119 CORONARY ARTERY DISEASE INVOLVING NATIVE HEART WITH ANGINA PECTORIS, UNSPECIFIED VESSEL OR LESION TYPE (HCC): ICD-10-CM

## 2017-11-17 DIAGNOSIS — F41.9 ANXIETY: ICD-10-CM

## 2017-11-17 DIAGNOSIS — R73.9 HYPERGLYCEMIA: Primary | ICD-10-CM

## 2017-11-17 DIAGNOSIS — E78.5 HYPERLIPIDEMIA, UNSPECIFIED HYPERLIPIDEMIA TYPE: ICD-10-CM

## 2017-11-17 LAB
A/G RATIO: 1.7 (ref 0.8–2)
ALBUMIN SERPL-MCNC: 4.6 G/DL (ref 3.4–4.8)
ALP BLD-CCNC: 81 U/L (ref 25–100)
ALT SERPL-CCNC: 24 U/L (ref 4–36)
ANION GAP SERPL CALCULATED.3IONS-SCNC: 10 MMOL/L (ref 3–16)
AST SERPL-CCNC: 22 U/L (ref 8–33)
BILIRUB SERPL-MCNC: 0.3 MG/DL (ref 0.3–1.2)
BUN BLDV-MCNC: 4 MG/DL (ref 6–20)
CALCIUM SERPL-MCNC: 9.5 MG/DL (ref 8.5–10.5)
CHLORIDE BLD-SCNC: 99 MMOL/L (ref 98–107)
CHOLESTEROL, TOTAL: 182 MG/DL (ref 0–200)
CO2: 33 MMOL/L (ref 20–30)
CREAT SERPL-MCNC: 0.8 MG/DL (ref 0.4–1.2)
CREATININE URINE: 24.4 MG/DL (ref 1.5–300)
GFR AFRICAN AMERICAN: >59
GFR NON-AFRICAN AMERICAN: >59
GLOBULIN: 2.7 G/DL
GLUCOSE BLD-MCNC: 106 MG/DL (ref 74–106)
HDLC SERPL-MCNC: 47 MG/DL (ref 40–60)
LDL CHOLESTEROL CALCULATED: 104 MG/DL
MICROALBUMIN UR-MCNC: <1.2 MG/DL (ref 0–22)
MICROALBUMIN/CREAT UR-RTO: NORMAL MG/G (ref 0–30)
POTASSIUM SERPL-SCNC: 4.5 MMOL/L (ref 3.4–5.1)
SODIUM BLD-SCNC: 142 MMOL/L (ref 136–145)
TOTAL PROTEIN: 7.3 G/DL (ref 6.4–8.3)
TRIGL SERPL-MCNC: 153 MG/DL (ref 0–249)
VLDLC SERPL CALC-MCNC: 31 MG/DL

## 2017-11-17 PROCEDURE — 3017F COLORECTAL CA SCREEN DOC REV: CPT | Performed by: NURSE PRACTITIONER

## 2017-11-17 PROCEDURE — 99213 OFFICE O/P EST LOW 20 MIN: CPT | Performed by: NURSE PRACTITIONER

## 2017-11-17 PROCEDURE — 1036F TOBACCO NON-USER: CPT | Performed by: NURSE PRACTITIONER

## 2017-11-17 PROCEDURE — 4040F PNEUMOC VAC/ADMIN/RCVD: CPT | Performed by: NURSE PRACTITIONER

## 2017-11-17 PROCEDURE — G8484 FLU IMMUNIZE NO ADMIN: HCPCS | Performed by: NURSE PRACTITIONER

## 2017-11-17 PROCEDURE — G8598 ASA/ANTIPLAT THER USED: HCPCS | Performed by: NURSE PRACTITIONER

## 2017-11-17 PROCEDURE — G8417 CALC BMI ABV UP PARAM F/U: HCPCS | Performed by: NURSE PRACTITIONER

## 2017-11-17 PROCEDURE — G8427 DOCREV CUR MEDS BY ELIG CLIN: HCPCS | Performed by: NURSE PRACTITIONER

## 2017-11-17 PROCEDURE — 1123F ACP DISCUSS/DSCN MKR DOCD: CPT | Performed by: NURSE PRACTITIONER

## 2017-11-17 RX ORDER — LEVOFLOXACIN 500 MG/1
500 TABLET, FILM COATED ORAL DAILY
Qty: 10 TABLET | Refills: 0 | Status: SHIPPED | OUTPATIENT
Start: 2017-11-17 | End: 2017-11-27

## 2017-11-17 RX ORDER — ALBUTEROL SULFATE 90 UG/1
2 AEROSOL, METERED RESPIRATORY (INHALATION) EVERY 4 HOURS PRN
Qty: 8.5 G | Refills: 5 | Status: SHIPPED | OUTPATIENT
Start: 2017-11-17 | End: 2018-01-02 | Stop reason: SDUPTHER

## 2017-11-17 ASSESSMENT — ENCOUNTER SYMPTOMS
ABDOMINAL PAIN: 0
SHORTNESS OF BREATH: 0
NAUSEA: 0
VOMITING: 0
SORE THROAT: 0
EYE PAIN: 0
COUGH: 1

## 2017-11-27 NOTE — PROGRESS NOTES
Component Current Result Ref Range Previous Result Ref Range   Alb 4.6 (11/17/2017) 3.4 - 4.8 g/dL Not in Time Range    Albumin/Globulin Ratio 1.7 (11/17/2017) 0.8 - 2.0 Not in Time Range    Alkaline Phosphatase 81 (11/17/2017) 25 - 100 U/L Not in Time Range    ALT 24 (11/17/2017) 4 - 36 U/L Not in Time Range    AST 22 (11/17/2017) 8 - 33 U/L Not in Time Range    BUN 4 (L) (11/17/2017) 6 - 20 mg/dL Not in Time Range    Calcium 9.5 (11/17/2017) 8.5 - 10.5 mg/dL Not in Time Range    Chloride 99 (11/17/2017) 98 - 107 mmol/L Not in Time Range    CO2 33 (H) (11/17/2017) 20 - 30 mmol/L Not in Time Range    CREATININE 0.8 (11/17/2017) 0.4 - 1.2 mg/dL Not in Time Range    GFR  >59 (11/17/2017) >59 Not in Time Range    GFR Non- >59 (11/17/2017) >59 Not in Time Range    Globulin 2.7 (11/17/2017) g/dL Not in Time Range    Glucose 106 (11/17/2017) 74 - 106 mg/dL Not in Time Range    Potassium 4.5 (11/17/2017) 3.4 - 5.1 mmol/L Not in Time Range    Sodium 142 (11/17/2017) 136 - 145 mmol/L Not in Time Range    Total Bilirubin 0.3 (11/17/2017) 0.3 - 1.2 mg/dL Not in Time Range    Total Protein 7.3 (11/17/2017) 6.4 - 8.3 g/dL Not in Time Range        Hemoglobin A1C (%)   Date Value   08/17/2017 6.2     MICROALBUMIN, RANDOM URINE (mg/dL)   Date Value   11/17/2017 <1.20     LDL Calculated (mg/dL)   Date Value   11/17/2017 104         Lab Results   Component Value Date    WBC 4.7 05/18/2017    NEUTROABS 2.9 05/18/2017    HGB 17.3 05/18/2017    HCT 51.2 05/18/2017    MCV 96.2 05/18/2017     05/18/2017       Lab Results   Component Value Date    TSH 2.74 08/02/2016       Prior to Visit Medications    Medication Sig Taking?  Authorizing Provider   albuterol sulfate HFA (PROAIR HFA) 108 (90 Base) MCG/ACT inhaler Inhale 2 puffs into the lungs every 4 hours as needed for Wheezing Yes VIKI Lane   levofloxacin (LEVAQUIN) 500 MG tablet Take 1 tablet by mouth daily for 10 days Yes Keith Mcintosh VIKI   brompheniramine-pseudoephedrine-DM 30-2-10 MG/5ML syrup Take 5 mLs by mouth 4 times daily as needed for Congestion or Cough Yes Tatiana Peabody, APRN   ALPRAZolam [place]ne [place]) 2 MG tablet TAKE ONE TABLET BY MOUTH TWO TIMES A DAY AS NEEDED FOR SLEEP Yes VIKI Hawkins   Glucose Blood (BLOOD GLUCOSE TEST STRIPS) STRP 1 Device by In Vitro route daily DX: E11.9 Yes VIKI Hawkins   PARoxetine (PAXIL) 20 MG tablet TAKE 1 TABLET BY MOUTH DAILY FOR ANXIETY AND DEPRESSION Yes VIKI Hawkins   pravastatin (PRAVACHOL) 40 MG tablet TAKE ONE AND 1/2 TABLETS BY MOUTH EVERY DAY Yes VIKI Hawkins   cyclobenzaprine (FLEXERIL) 10 MG tablet Take 10 mg by mouth daily Yes Historical Provider, MD   fluticasone (FLONASE) 50 MCG/ACT nasal spray 1 spray by Nasal route daily Yes Nicolle Lang DO   gabapentin (NEURONTIN) 800 MG tablet TAKE ONE TABLET BY MOUTH FOUR TIMES A DAY Yes VIKI Hawkins   pravastatin (PRAVACHOL) 80 MG tablet TAKE ONE TABLET BY MOUTH EVERY DAY Yes VIKI Hawkins   HYDROcodone-acetaminophen (NORCO)  MG per tablet Take 1 tablet by mouth every 6 hours as needed. Indications: from pain clinic Yes Historical Provider, MD       ASSESSMENT:  1. Hyperlipidemia, unspecified hyperlipidemia type    2. Acute bronchitis, unspecified organism    3. Anxiety    4. Coronary artery disease involving native heart with angina pectoris, unspecified vessel or lesion type [facility])          PLAN:    Orders Placed This Encounter   Medications    albuterol sulfate HFA (PROAIR HFA) 108 (90 Base) MCG/ACT inhaler     Sig: Inhale 2 puffs into the lungs every 4 hours as needed for Wheezing     Dispense:  8.5 g     Refill:  5    levofloxacin (LEVAQUIN) 500 MG tablet     Sig: Take 1 tablet by mouth daily for 10 days     Dispense:  10 tablet     Refill:  0     Orders Placed This Encounter   Procedures    LIPID PANEL    COMPREHENSIVE METABOLIC PANEL     Rest. Increase fluids.  Call if no improvement in a few days. Return in about 3 months (around 2/17/2018).

## 2017-12-04 RX ORDER — ALPRAZOLAM 2 MG/1
TABLET ORAL
Qty: 60 TABLET | Refills: 0 | OUTPATIENT
Start: 2017-12-04

## 2017-12-05 RX ORDER — GABAPENTIN 800 MG/1
TABLET ORAL
Qty: 120 TABLET | Refills: 5 | Status: SHIPPED | OUTPATIENT
Start: 2017-12-05 | End: 2018-01-19 | Stop reason: SDUPTHER

## 2018-01-02 ENCOUNTER — TELEPHONE (OUTPATIENT)
Dept: PRIMARY CARE CLINIC | Age: 68
End: 2018-01-02

## 2018-01-03 RX ORDER — PREDNISONE 10 MG/1
10 TABLET ORAL DAILY
Qty: 42 TABLET | Refills: 0 | Status: SHIPPED | OUTPATIENT
Start: 2018-01-03 | End: 2018-01-30 | Stop reason: SDUPTHER

## 2018-01-15 ENCOUNTER — HOSPITAL ENCOUNTER (OUTPATIENT)
Dept: MRI IMAGING | Age: 68
Discharge: HOME | End: 2018-01-15
Attending: NURSE PRACTITIONER | Admitting: NURSE PRACTITIONER

## 2018-01-15 DIAGNOSIS — M47.816 SPONDYLOSIS OF LUMBAR REGION WITHOUT MYELOPATHY OR RADICULOPATHY: ICD-10-CM

## 2018-01-15 DIAGNOSIS — R52 PAIN: ICD-10-CM

## 2018-01-17 ENCOUNTER — OFFICE VISIT (OUTPATIENT)
Dept: PRIMARY CARE CLINIC | Age: 68
End: 2018-01-17
Payer: MEDICARE

## 2018-01-17 VITALS
WEIGHT: 171 LBS | BODY MASS INDEX: 26.78 KG/M2 | OXYGEN SATURATION: 95 % | DIASTOLIC BLOOD PRESSURE: 60 MMHG | SYSTOLIC BLOOD PRESSURE: 150 MMHG | HEART RATE: 98 BPM | TEMPERATURE: 97.8 F

## 2018-01-17 DIAGNOSIS — R52 BODY ACHES: ICD-10-CM

## 2018-01-17 DIAGNOSIS — J20.9 ACUTE BRONCHITIS, UNSPECIFIED ORGANISM: Primary | ICD-10-CM

## 2018-01-17 LAB
INFLUENZA A ANTIBODY: NORMAL
INFLUENZA B ANTIBODY: NORMAL

## 2018-01-17 PROCEDURE — 99213 OFFICE O/P EST LOW 20 MIN: CPT | Performed by: NURSE PRACTITIONER

## 2018-01-17 PROCEDURE — 1036F TOBACCO NON-USER: CPT | Performed by: NURSE PRACTITIONER

## 2018-01-17 PROCEDURE — 3017F COLORECTAL CA SCREEN DOC REV: CPT | Performed by: NURSE PRACTITIONER

## 2018-01-17 PROCEDURE — 96372 THER/PROPH/DIAG INJ SC/IM: CPT | Performed by: NURSE PRACTITIONER

## 2018-01-17 PROCEDURE — G8427 DOCREV CUR MEDS BY ELIG CLIN: HCPCS | Performed by: NURSE PRACTITIONER

## 2018-01-17 PROCEDURE — 87804 INFLUENZA ASSAY W/OPTIC: CPT | Performed by: NURSE PRACTITIONER

## 2018-01-17 PROCEDURE — 1123F ACP DISCUSS/DSCN MKR DOCD: CPT | Performed by: NURSE PRACTITIONER

## 2018-01-17 PROCEDURE — G8484 FLU IMMUNIZE NO ADMIN: HCPCS | Performed by: NURSE PRACTITIONER

## 2018-01-17 PROCEDURE — G8417 CALC BMI ABV UP PARAM F/U: HCPCS | Performed by: NURSE PRACTITIONER

## 2018-01-17 PROCEDURE — 4040F PNEUMOC VAC/ADMIN/RCVD: CPT | Performed by: NURSE PRACTITIONER

## 2018-01-17 PROCEDURE — G8599 NO ASA/ANTIPLAT THER USE RNG: HCPCS | Performed by: NURSE PRACTITIONER

## 2018-01-17 RX ORDER — LEVOFLOXACIN 500 MG/1
500 TABLET, FILM COATED ORAL DAILY
Qty: 10 TABLET | Refills: 0 | Status: SHIPPED | OUTPATIENT
Start: 2018-01-17 | End: 2018-01-27

## 2018-01-17 RX ORDER — DEXAMETHASONE SODIUM PHOSPHATE 10 MG/ML
10 INJECTION INTRAMUSCULAR; INTRAVENOUS ONCE
Status: COMPLETED | OUTPATIENT
Start: 2018-01-17 | End: 2018-01-17

## 2018-01-17 RX ADMIN — DEXAMETHASONE SODIUM PHOSPHATE 10 MG: 10 INJECTION INTRAMUSCULAR; INTRAVENOUS at 14:19

## 2018-01-17 ASSESSMENT — ENCOUNTER SYMPTOMS
COUGH: 1
VOMITING: 0
ABDOMINAL PAIN: 0
SORE THROAT: 0
DIARRHEA: 1
EYE PAIN: 0
NAUSEA: 0
SHORTNESS OF BREATH: 0
WHEEZING: 1

## 2018-01-19 ENCOUNTER — OFFICE VISIT (OUTPATIENT)
Dept: PRIMARY CARE CLINIC | Age: 68
End: 2018-01-19
Payer: MEDICARE

## 2018-01-19 VITALS
HEART RATE: 85 BPM | SYSTOLIC BLOOD PRESSURE: 122 MMHG | BODY MASS INDEX: 26.78 KG/M2 | OXYGEN SATURATION: 93 % | WEIGHT: 171 LBS | DIASTOLIC BLOOD PRESSURE: 60 MMHG

## 2018-01-19 DIAGNOSIS — F41.9 ANXIETY: ICD-10-CM

## 2018-01-19 DIAGNOSIS — M54.9 CHRONIC BACK PAIN, UNSPECIFIED BACK LOCATION, UNSPECIFIED BACK PAIN LATERALITY: ICD-10-CM

## 2018-01-19 DIAGNOSIS — J20.9 ACUTE BRONCHITIS, UNSPECIFIED ORGANISM: Primary | ICD-10-CM

## 2018-01-19 DIAGNOSIS — G89.29 CHRONIC BACK PAIN, UNSPECIFIED BACK LOCATION, UNSPECIFIED BACK PAIN LATERALITY: ICD-10-CM

## 2018-01-19 PROCEDURE — G8484 FLU IMMUNIZE NO ADMIN: HCPCS | Performed by: NURSE PRACTITIONER

## 2018-01-19 PROCEDURE — G8599 NO ASA/ANTIPLAT THER USE RNG: HCPCS | Performed by: NURSE PRACTITIONER

## 2018-01-19 PROCEDURE — G8428 CUR MEDS NOT DOCUMENT: HCPCS | Performed by: NURSE PRACTITIONER

## 2018-01-19 PROCEDURE — 4040F PNEUMOC VAC/ADMIN/RCVD: CPT | Performed by: NURSE PRACTITIONER

## 2018-01-19 PROCEDURE — 1123F ACP DISCUSS/DSCN MKR DOCD: CPT | Performed by: NURSE PRACTITIONER

## 2018-01-19 PROCEDURE — G8417 CALC BMI ABV UP PARAM F/U: HCPCS | Performed by: NURSE PRACTITIONER

## 2018-01-19 PROCEDURE — 1036F TOBACCO NON-USER: CPT | Performed by: NURSE PRACTITIONER

## 2018-01-19 PROCEDURE — 99213 OFFICE O/P EST LOW 20 MIN: CPT | Performed by: NURSE PRACTITIONER

## 2018-01-19 PROCEDURE — 3017F COLORECTAL CA SCREEN DOC REV: CPT | Performed by: NURSE PRACTITIONER

## 2018-01-19 RX ORDER — ALBUTEROL SULFATE 90 UG/1
AEROSOL, METERED RESPIRATORY (INHALATION)
Qty: 8.5 G | Refills: 5 | Status: SHIPPED | OUTPATIENT
Start: 2018-01-19 | End: 2018-08-27 | Stop reason: SDUPTHER

## 2018-01-19 RX ORDER — GABAPENTIN 800 MG/1
TABLET ORAL
Qty: 120 TABLET | Refills: 5 | Status: SHIPPED | OUTPATIENT
Start: 2018-01-19 | End: 2018-06-22 | Stop reason: SDUPTHER

## 2018-01-19 RX ORDER — ALPRAZOLAM 2 MG/1
TABLET ORAL
Qty: 60 TABLET | Refills: 0 | Status: SHIPPED | OUTPATIENT
Start: 2018-01-19 | End: 2018-02-26 | Stop reason: SDUPTHER

## 2018-01-19 ASSESSMENT — ENCOUNTER SYMPTOMS
COUGH: 1
SORE THROAT: 0
SHORTNESS OF BREATH: 0
NAUSEA: 0
EYE PAIN: 0
VOMITING: 0
ABDOMINAL PAIN: 0

## 2018-01-19 NOTE — PROGRESS NOTES
Have you seen any other physician or provider since your last visit? no    Have you had any other diagnostic tests since your last visit? no    Have you changed or stopped any medications since your last visit including any over-the-counter medicines, vitamins, or herbal medicines? no     Are you taking all your prescribed medications? Yes  If NO, why? -  N/A      REVIEW OF SYSTEMS:  Review of Systems   Constitutional: Negative for chills and fever. HENT: Positive for congestion. Negative for ear pain and sore throat. Eyes: Negative for pain and visual disturbance. Respiratory: Positive for cough. Negative for shortness of breath. Cardiovascular: Negative for chest pain, palpitations and leg swelling. Gastrointestinal: Negative for abdominal pain, nausea and vomiting. Genitourinary: Negative for dysuria and hematuria. Musculoskeletal: Negative for joint swelling. Skin: Negative for rash. Neurological: Negative for dizziness and weakness. Psychiatric/Behavioral: Negative for sleep disturbance.

## 2018-01-24 ENCOUNTER — TELEPHONE (OUTPATIENT)
Dept: PRIMARY CARE CLINIC | Age: 68
End: 2018-01-24

## 2018-01-25 RX ORDER — IPRATROPIUM BROMIDE AND ALBUTEROL SULFATE 2.5; .5 MG/3ML; MG/3ML
1 SOLUTION RESPIRATORY (INHALATION) EVERY 4 HOURS PRN
Qty: 360 ML | Refills: 5 | Status: SHIPPED | OUTPATIENT
Start: 2018-01-25 | End: 2018-11-19 | Stop reason: ALTCHOICE

## 2018-01-25 RX ORDER — PAROXETINE HYDROCHLORIDE 20 MG/1
TABLET, FILM COATED ORAL
Qty: 30 TABLET | Refills: 5 | Status: SHIPPED | OUTPATIENT
Start: 2018-01-25 | End: 2018-02-26 | Stop reason: SDUPTHER

## 2018-01-25 RX ORDER — IPRATROPIUM BROMIDE AND ALBUTEROL SULFATE 2.5; .5 MG/3ML; MG/3ML
1 SOLUTION RESPIRATORY (INHALATION) EVERY 4 HOURS PRN
Qty: 360 ML | Refills: 5 | Status: SHIPPED | OUTPATIENT
Start: 2018-01-25 | End: 2018-01-25 | Stop reason: SDUPTHER

## 2018-01-30 ENCOUNTER — OFFICE VISIT (OUTPATIENT)
Dept: PRIMARY CARE CLINIC | Age: 68
End: 2018-01-30
Payer: MEDICARE

## 2018-01-30 VITALS
BODY MASS INDEX: 26.63 KG/M2 | SYSTOLIC BLOOD PRESSURE: 122 MMHG | HEART RATE: 89 BPM | DIASTOLIC BLOOD PRESSURE: 80 MMHG | OXYGEN SATURATION: 88 % | WEIGHT: 170 LBS

## 2018-01-30 DIAGNOSIS — J20.9 ACUTE BRONCHITIS, UNSPECIFIED ORGANISM: Primary | ICD-10-CM

## 2018-01-30 DIAGNOSIS — J44.1 COPD EXACERBATION (HCC): ICD-10-CM

## 2018-01-30 PROCEDURE — G8428 CUR MEDS NOT DOCUMENT: HCPCS | Performed by: NURSE PRACTITIONER

## 2018-01-30 PROCEDURE — G8926 SPIRO NO PERF OR DOC: HCPCS | Performed by: NURSE PRACTITIONER

## 2018-01-30 PROCEDURE — G8417 CALC BMI ABV UP PARAM F/U: HCPCS | Performed by: NURSE PRACTITIONER

## 2018-01-30 PROCEDURE — 1123F ACP DISCUSS/DSCN MKR DOCD: CPT | Performed by: NURSE PRACTITIONER

## 2018-01-30 PROCEDURE — 96372 THER/PROPH/DIAG INJ SC/IM: CPT | Performed by: NURSE PRACTITIONER

## 2018-01-30 PROCEDURE — 3023F SPIROM DOC REV: CPT | Performed by: NURSE PRACTITIONER

## 2018-01-30 PROCEDURE — 99213 OFFICE O/P EST LOW 20 MIN: CPT | Performed by: NURSE PRACTITIONER

## 2018-01-30 PROCEDURE — 4040F PNEUMOC VAC/ADMIN/RCVD: CPT | Performed by: NURSE PRACTITIONER

## 2018-01-30 PROCEDURE — G8484 FLU IMMUNIZE NO ADMIN: HCPCS | Performed by: NURSE PRACTITIONER

## 2018-01-30 PROCEDURE — 3017F COLORECTAL CA SCREEN DOC REV: CPT | Performed by: NURSE PRACTITIONER

## 2018-01-30 PROCEDURE — G8599 NO ASA/ANTIPLAT THER USE RNG: HCPCS | Performed by: NURSE PRACTITIONER

## 2018-01-30 PROCEDURE — 1036F TOBACCO NON-USER: CPT | Performed by: NURSE PRACTITIONER

## 2018-01-30 RX ORDER — PREDNISONE 10 MG/1
10 TABLET ORAL DAILY
Qty: 42 TABLET | Refills: 0 | Status: SHIPPED | OUTPATIENT
Start: 2018-01-30 | End: 2018-09-21 | Stop reason: ALTCHOICE

## 2018-01-30 RX ORDER — CIPROFLOXACIN 500 MG/1
500 TABLET, FILM COATED ORAL 2 TIMES DAILY
Qty: 20 TABLET | Refills: 0 | Status: SHIPPED | OUTPATIENT
Start: 2018-01-30 | End: 2018-02-09

## 2018-01-30 RX ORDER — DEXAMETHASONE SODIUM PHOSPHATE 10 MG/ML
10 INJECTION INTRAMUSCULAR; INTRAVENOUS ONCE
Status: COMPLETED | OUTPATIENT
Start: 2018-01-30 | End: 2018-01-30

## 2018-01-30 RX ADMIN — DEXAMETHASONE SODIUM PHOSPHATE 10 MG: 10 INJECTION INTRAMUSCULAR; INTRAVENOUS at 16:23

## 2018-01-30 ASSESSMENT — ENCOUNTER SYMPTOMS
ABDOMINAL PAIN: 0
NAUSEA: 0
SORE THROAT: 0
VOMITING: 0
SHORTNESS OF BREATH: 0
EYE PAIN: 0
COUGH: 1

## 2018-01-30 NOTE — PROGRESS NOTES
After obtaining consent, and per orders of Rosa Elena Mahmood injection of Decadron 10 given in Right deltoid by [employer]. Patient instructed to remain in clinic for 20 minutes afterwards, and to report any adverse reaction to me immediately.

## 2018-02-01 ENCOUNTER — OFFICE VISIT (OUTPATIENT)
Dept: PRIMARY CARE CLINIC | Age: 68
End: 2018-02-01
Payer: MEDICARE

## 2018-02-01 VITALS
SYSTOLIC BLOOD PRESSURE: 130 MMHG | BODY MASS INDEX: 26.5 KG/M2 | OXYGEN SATURATION: 93 % | DIASTOLIC BLOOD PRESSURE: 60 MMHG | WEIGHT: 169.2 LBS | HEART RATE: 91 BPM

## 2018-02-01 DIAGNOSIS — J44.1 COPD EXACERBATION (HCC): ICD-10-CM

## 2018-02-01 DIAGNOSIS — E11.9 TYPE 2 DIABETES MELLITUS WITHOUT COMPLICATION, UNSPECIFIED LONG TERM INSULIN USE STATUS: Primary | ICD-10-CM

## 2018-02-01 DIAGNOSIS — I25.119 CORONARY ARTERY DISEASE INVOLVING NATIVE HEART WITH ANGINA PECTORIS, UNSPECIFIED VESSEL OR LESION TYPE (HCC): ICD-10-CM

## 2018-02-01 DIAGNOSIS — E78.5 HYPERLIPIDEMIA, UNSPECIFIED HYPERLIPIDEMIA TYPE: ICD-10-CM

## 2018-02-01 DIAGNOSIS — J20.9 ACUTE BRONCHITIS, UNSPECIFIED ORGANISM: ICD-10-CM

## 2018-02-01 LAB — HBA1C MFR BLD: 6.9 %

## 2018-02-01 PROCEDURE — G8427 DOCREV CUR MEDS BY ELIG CLIN: HCPCS | Performed by: NURSE PRACTITIONER

## 2018-02-01 PROCEDURE — G8417 CALC BMI ABV UP PARAM F/U: HCPCS | Performed by: NURSE PRACTITIONER

## 2018-02-01 PROCEDURE — 3044F HG A1C LEVEL LT 7.0%: CPT | Performed by: NURSE PRACTITIONER

## 2018-02-01 PROCEDURE — 1036F TOBACCO NON-USER: CPT | Performed by: NURSE PRACTITIONER

## 2018-02-01 PROCEDURE — 4040F PNEUMOC VAC/ADMIN/RCVD: CPT | Performed by: NURSE PRACTITIONER

## 2018-02-01 PROCEDURE — 99213 OFFICE O/P EST LOW 20 MIN: CPT | Performed by: NURSE PRACTITIONER

## 2018-02-01 PROCEDURE — G8599 NO ASA/ANTIPLAT THER USE RNG: HCPCS | Performed by: NURSE PRACTITIONER

## 2018-02-01 PROCEDURE — 3017F COLORECTAL CA SCREEN DOC REV: CPT | Performed by: NURSE PRACTITIONER

## 2018-02-01 PROCEDURE — G8484 FLU IMMUNIZE NO ADMIN: HCPCS | Performed by: NURSE PRACTITIONER

## 2018-02-01 PROCEDURE — 83036 HEMOGLOBIN GLYCOSYLATED A1C: CPT | Performed by: NURSE PRACTITIONER

## 2018-02-01 PROCEDURE — 3023F SPIROM DOC REV: CPT | Performed by: NURSE PRACTITIONER

## 2018-02-01 PROCEDURE — 1123F ACP DISCUSS/DSCN MKR DOCD: CPT | Performed by: NURSE PRACTITIONER

## 2018-02-01 PROCEDURE — G8926 SPIRO NO PERF OR DOC: HCPCS | Performed by: NURSE PRACTITIONER

## 2018-02-01 ASSESSMENT — ENCOUNTER SYMPTOMS
VOMITING: 0
SHORTNESS OF BREATH: 0
SORE THROAT: 0
NAUSEA: 0
EYE PAIN: 0
COUGH: 0
ABDOMINAL PAIN: 0

## 2018-02-05 ENCOUNTER — TELEPHONE (OUTPATIENT)
Dept: PRIMARY CARE CLINIC | Age: 68
End: 2018-02-05

## 2018-02-05 NOTE — TELEPHONE ENCOUNTER
Patient has a white coating on his tongue and thinks it is caused from his antibiotics.  Ok to call in swish and swallow per Rosa Elena.

## 2018-02-06 NOTE — PROGRESS NOTES
SUBJECTIVE:    Patient ID: Jaya Morris is a 75 y.o. male. Medical history Review  Past Medical, Family, and Social History reviewed and does not contribute to the patient presenting condition    Health Maintenance Due   Topic Date Due    AAA screen  1950    Hepatitis C screen  1950    Diabetic foot exam  12/26/1959    Diabetic retinal exam  12/26/1959    DTaP/Tdap/Td vaccine (1 - Tdap) 12/26/1968    Pneumococcal low/med risk (2 of 2 - PCV13) 10/26/2016    Flu vaccine (1) 09/01/2017        HPI:   Chief Complaint   Patient presents with    Cough     Patient here today for cough and congestion. He states he has had this since around the first of the month. He has had steriods but they haven't helped. He has coughed so much he is sore. He states he does have body aches.  Chest Congestion       Patient's medications, allergies, past medical, surgical, social and family histories were reviewed and updated as appropriate. Review of Systems Reviewed and acurate. See MA note. OBJECTIVE:  BP (!) 150/60 (Site: Right Arm, Position: Sitting, Cuff Size: Medium Adult)   Pulse 98   Temp 97.8 °F (36.6 °C) (Oral)   Wt 171 lb (77.6 kg)   SpO2 95%   BMI 26.78 kg/m²    Physical Exam   Constitutional: He is oriented to person, place, and time. He appears well-developed and well-nourished. No distress. HENT:   Head: Normocephalic. Right Ear: Tympanic membrane normal.   Left Ear: Tympanic membrane normal.   Mouth/Throat: No oropharyngeal exudate. Eyes: Lids are normal.   Neck: Neck supple. Cardiovascular: Normal rate, regular rhythm and normal heart sounds. Pulmonary/Chest: Effort normal. He has decreased breath sounds. He has wheezes. Abdominal: Soft. Bowel sounds are normal. He exhibits no distension. There is no tenderness. Musculoskeletal: He exhibits no edema. Lymphadenopathy:     He has no cervical adenopathy.    Neurological: He is alert and oriented to person, VIKI Peterson   PARoxetine (PAXIL) 20 MG tablet TAKE 1 TABLET BY MOUTH DAILY FOR ANXIETY AND DEPRESSION  VIKI Miner   ALPRAZolam Raynohemy Ennis) 2 MG tablet TAKE ONE TABLET BY MOUTH TWO TIMES A DAY AS NEEDED FOR SLEEP- hold until due. VIKI Miner   gabapentin (NEURONTIN) 800 MG tablet TAKE ONE TABLET BY MOUTH FOUR TIMES A DAY. VIKI Miner   albuterol sulfate HFA (PROAIR HFA) 108 (90 Base) MCG/ACT inhaler INHALE 2 PUFFS INTO THE LUNGS EVERY 6 HOURS AS NEEDED FOR WHEEZING  VIKI Miner       ASSESSMENT:  1. Acute bronchitis, unspecified organism    2. Body aches          PLAN:    Orders Placed This Encounter   Medications    dexamethasone (DECADRON) injection 10 mg    levofloxacin (LEVAQUIN) 500 MG tablet     Sig: Take 1 tablet by mouth daily for 10 days     Dispense:  10 tablet     Refill:  0     Orders Placed This Encounter   Procedures    POCT Influenza A/B     Patient Instructions   Take breathing treatments every 4 hours while awake. Start Levaquin daily for infection. Finish Prednisone pills. F/U Friday.

## 2018-02-12 NOTE — PROGRESS NOTES
02/01/2018 6.9     Microalbumin, Random Urine (mg/dL)   Date Value   11/17/2017 <1.20     LDL Calculated (mg/dL)   Date Value   11/17/2017 104         Lab Results   Component Value Date    WBC 4.7 05/18/2017    NEUTROABS 2.9 05/18/2017    HGB 17.3 05/18/2017    HCT 51.2 05/18/2017    MCV 96.2 05/18/2017     05/18/2017       Lab Results   Component Value Date    TSH 2.74 08/02/2016       Prior to Visit Medications    Medication Sig Taking? Authorizing Provider   nystatin (MYCOSTATIN) 992607 UNIT/ML suspension Take 5 mLs by mouth 4 times daily for 7 days Swish and Swallow  VIKI Amos   predniSONE (DELTASONE) 10 MG tablet Take 1 tablet by mouth daily 60mg x2d, 50mg x2d, 40mg x2d, 30mg x2d, 20mg x2d, 10mg x2d, then stop  VIKI Amos   ipratropium-albuterol (DUONEB) 0.5-2.5 (3) MG/3ML SOLN nebulizer solution Inhale 3 mLs into the lungs every 4 hours as needed for Shortness of Breath DX J44.9  VIKI Amos   PARoxetine (PAXIL) 20 MG tablet TAKE 1 TABLET BY MOUTH DAILY FOR ANXIETY AND DEPRESSION  VIKI Amos   ALPRAZolam Jerlene Reading) 2 MG tablet TAKE ONE TABLET BY MOUTH TWO TIMES A DAY AS NEEDED FOR SLEEP- hold until due. VIKI Amos   gabapentin (NEURONTIN) 800 MG tablet TAKE ONE TABLET BY MOUTH FOUR TIMES A DAY.   VIKI Amos   albuterol sulfate HFA (PROAIR HFA) 108 (90 Base) MCG/ACT inhaler INHALE 2 PUFFS INTO THE LUNGS EVERY 6 HOURS AS NEEDED FOR WHEEZING  VIKI Amos   Glucose Blood (BLOOD GLUCOSE TEST STRIPS) STRP 1 Device by In Vitro route daily DX: E11.9  VIKI Amos   pravastatin (PRAVACHOL) 40 MG tablet TAKE ONE AND 1/2 TABLETS BY MOUTH EVERY DAY  VIKI Amos   cyclobenzaprine (FLEXERIL) 10 MG tablet Take 10 mg by mouth daily  Historical Provider, MD   fluticasone (FLONASE) 50 MCG/ACT nasal spray 1 spray by Nasal route daily  Dorene Cornell, DO   pravastatin (PRAVACHOL) 80 MG tablet TAKE ONE TABLET BY MOUTH EVERY DAY  Shine Kearney VIKI Young   HYDROcodone-acetaminophen (NORCO)  MG per tablet Take 1 tablet by mouth every 6 hours as needed. Indications: from pain clinic  Historical Provider, MD       ASSESSMENT:  1. Type 2 diabetes mellitus without complication, unspecified long term insulin use status ([facility] [address]    2. Acute bronchitis, unspecified organism    3. COPD exacerbation ([facility] [address]    4. Hyperlipidemia, unspecified hyperlipidemia type    5. Coronary artery disease involving native heart with angina pectoris, unspecified vessel or lesion type [facility])          PLAN:    Finish all meds given. Fasting labs prior to f/u. Orders Placed This Encounter   Procedures    LIPID PANEL    COMPREHENSIVE METABOLIC PANEL    CBC WITH AUTO DIFFERENTIAL    HEMOGLOBIN A1C    POCT glycosylated hemoglobin (Hb A1C)       Return in about 3 months (around 5/1/2018).

## 2018-02-12 NOTE — PROGRESS NOTES
SUBJECTIVE:    Patient ID: Tammie Burton is a 75 y.o. male. Medical history Review  Past Medical, Family, and Social History reviewed and does not contribute to the patient presenting condition    Health Maintenance Due   Topic Date Due    AAA screen  1950    Hepatitis C screen  1950    Diabetic foot exam  12/26/1959    Diabetic retinal exam  12/26/1959    DTaP/Tdap/Td vaccine (1 - Tdap) 12/26/1968    Pneumococcal low/med risk (2 of 2 - PCV13) 10/26/2016    Flu vaccine (1) 09/01/2017        HPI:   Chief Complaint   Patient presents with    Cough     Patient here today for  cough and congestion. He states the insurance wont pay for proair and it helps him better.  Chest Congestion       Patient's medications, allergies, past medical, surgical, social and family histories were reviewed and updated as appropriate. Review of Systems Reviewed and acurate. See MA note. OBJECTIVE:  /80 (Site: Left Arm, Position: Sitting, Cuff Size: Medium Adult)   Pulse 89   Wt 170 lb (77.1 kg)   SpO2 (!) 88%   BMI 26.63 kg/m²    Physical Exam   Constitutional: He is oriented to person, place, and time. He appears well-developed and well-nourished. He appears ill. No distress. HENT:   Head: Normocephalic. Right Ear: Tympanic membrane normal.   Left Ear: Tympanic membrane normal.   Mouth/Throat: No oropharyngeal exudate. Eyes: Lids are normal.   Neck: Neck supple. Cardiovascular: Normal rate, regular rhythm and normal heart sounds. Pulmonary/Chest: Effort normal. He has decreased breath sounds. He has wheezes. He has rhonchi. Prolonged expiratory wheezing   Abdominal: Soft. Bowel sounds are normal. He exhibits no distension. There is no tenderness. Musculoskeletal: He exhibits no edema. Lymphadenopathy:     He has no cervical adenopathy. Neurological: He is alert and oriented to person, place, and time. Skin: Skin is warm and dry. There is pallor.    Psychiatric: He has a normal mood and affect. Vitals reviewed. No results found for requested labs within last 30 days. Hemoglobin A1C (%)   Date Value   02/01/2018 6.9     Microalbumin, Random Urine (mg/dL)   Date Value   11/17/2017 <1.20     LDL Calculated (mg/dL)   Date Value   11/17/2017 104         Lab Results   Component Value Date    WBC 4.7 05/18/2017    NEUTROABS 2.9 05/18/2017    HGB 17.3 05/18/2017    HCT 51.2 05/18/2017    MCV 96.2 05/18/2017     05/18/2017       Lab Results   Component Value Date    TSH 2.74 08/02/2016       Prior to Visit Medications    Medication Sig Taking? Authorizing Provider   predniSONE (DELTASONE) 10 MG tablet Take 1 tablet by mouth daily 60mg x2d, 50mg x2d, 40mg x2d, 30mg x2d, 20mg x2d, 10mg x2d, then stop Yes VIKI Benavides   nystatin (MYCOSTATIN) 038895 UNIT/ML suspension Take 5 mLs by mouth 4 times daily for 7 days Swish and Swallow  VIKI Benavides   ipratropium-albuterol (DUONEB) 0.5-2.5 (3) MG/3ML SOLN nebulizer solution Inhale 3 mLs into the lungs every 4 hours as needed for Shortness of Breath DX J44.9  VIKI Benavides   PARoxetine (PAXIL) 20 MG tablet TAKE 1 TABLET BY MOUTH DAILY FOR ANXIETY AND DEPRESSION  VIKI Benavides   ALPRAZolam Leotha Mare) 2 MG tablet TAKE ONE TABLET BY MOUTH TWO TIMES A DAY AS NEEDED FOR SLEEP- hold until due. VIKI Benavides   gabapentin (NEURONTIN) 800 MG tablet TAKE ONE TABLET BY MOUTH FOUR TIMES A DAY.   VIKI Benavides   albuterol sulfate HFA (PROAIR HFA) 108 (90 Base) MCG/ACT inhaler INHALE 2 PUFFS INTO THE LUNGS EVERY 6 HOURS AS NEEDED FOR WHEEZING  VIKI Benavides   Glucose Blood (BLOOD GLUCOSE TEST STRIPS) STRP 1 Device by In Vitro route daily DX: E11.9  VIKI Benavides   pravastatin (PRAVACHOL) 40 MG tablet TAKE ONE AND 1/2 TABLETS BY MOUTH EVERY DAY  VIKI Benavides   cyclobenzaprine (FLEXERIL) 10 MG tablet Take 10 mg by mouth daily  Historical Provider, MD   fluticasone (FLONASE) 50 MCG/ACT nasal spray 1 spray by Nasal route daily  Elida Beckwith DO   pravastatin (PRAVACHOL) 80 MG tablet TAKE ONE TABLET BY MOUTH EVERY DAY  VIKI Pink   HYDROcodone-acetaminophen (NORCO)  MG per tablet Take 1 tablet by mouth every 6 hours as needed. Indications: from pain clinic  Historical Provider, MD       ASSESSMENT:  1. Acute bronchitis, unspecified organism    2. COPD exacerbation ([facility]          PLAN:    Orders Placed This Encounter   Medications    dexamethasone (DECADRON) injection 10 mg    predniSONE (DELTASONE) 10 MG tablet     Sig: Take 1 tablet by mouth daily 60mg x2d, 50mg x2d, 40mg x2d, 30mg x2d, 20mg x2d, 10mg x2d, then stop     Dispense:  42 tablet     Refill:  0    ciprofloxacin (CIPRO) 500 MG tablet     Sig: Take 1 tablet by mouth 2 times daily for 10 days     Dispense:  20 tablet     Refill:  0     Rest. Increase fluids. Call if no improvement in a few days. F/U Thursday.

## 2018-02-26 DIAGNOSIS — F41.9 ANXIETY: ICD-10-CM

## 2018-02-26 RX ORDER — PAROXETINE HYDROCHLORIDE 20 MG/1
TABLET, FILM COATED ORAL
Qty: 30 TABLET | Refills: 5 | Status: SHIPPED | OUTPATIENT
Start: 2018-02-26 | End: 2018-03-28

## 2018-02-26 RX ORDER — PRAVASTATIN SODIUM 40 MG
TABLET ORAL
Qty: 45 TABLET | Refills: 5 | Status: SHIPPED | OUTPATIENT
Start: 2018-02-26 | End: 2018-11-19 | Stop reason: ALTCHOICE

## 2018-02-27 RX ORDER — ALPRAZOLAM 2 MG/1
TABLET ORAL
Qty: 60 TABLET | Refills: 0 | Status: SHIPPED | OUTPATIENT
Start: 2018-02-27 | End: 2018-03-28 | Stop reason: SDUPTHER

## 2018-03-20 ENCOUNTER — OFFICE VISIT (OUTPATIENT)
Dept: CARDIOLOGY | Facility: CLINIC | Age: 68
End: 2018-03-20

## 2018-03-20 VITALS
HEART RATE: 92 BPM | BODY MASS INDEX: 26.71 KG/M2 | DIASTOLIC BLOOD PRESSURE: 80 MMHG | WEIGHT: 170.2 LBS | SYSTOLIC BLOOD PRESSURE: 140 MMHG | HEIGHT: 67 IN

## 2018-03-20 DIAGNOSIS — I25.10 CORONARY ARTERY DISEASE INVOLVING NATIVE CORONARY ARTERY OF NATIVE HEART WITHOUT ANGINA PECTORIS: Primary | ICD-10-CM

## 2018-03-20 DIAGNOSIS — I77.9 BILATERAL CAROTID ARTERY DISEASE (HCC): ICD-10-CM

## 2018-03-20 DIAGNOSIS — E78.00 HYPERCHOLESTEROLEMIA: ICD-10-CM

## 2018-03-20 PROCEDURE — 99214 OFFICE O/P EST MOD 30 MIN: CPT | Performed by: INTERNAL MEDICINE

## 2018-03-20 RX ORDER — CYCLOBENZAPRINE HCL 10 MG
10 TABLET ORAL DAILY
COMMUNITY

## 2018-03-20 RX ORDER — ALBUTEROL SULFATE 90 UG/1
2 AEROSOL, METERED RESPIRATORY (INHALATION) EVERY 4 HOURS PRN
COMMUNITY

## 2018-03-20 RX ORDER — FLUTICASONE PROPIONATE 50 MCG
2 SPRAY, SUSPENSION (ML) NASAL AS NEEDED
COMMUNITY

## 2018-03-20 NOTE — PROGRESS NOTES
[facility] at [facility]  Office visit  Geronimo Pizano  1950  [phone]    VISIT DATE:  03/20/2018    PCP: Dee Galindo MD  [address] [place] [address] [address] [place] [address]    CC:  Chief Complaint   Patient presents with   • Coronary Artery Disease   • Carotid Artery Disease   • Shortness of Breath       PROBLEM LIST:  1. Coronary artery disease:  a. Remote bare-metal stent, April 1999.  b. Recurrent stenting of left anterior descending coronary artery in 2003.  c. Cardiac catheterization in August 2007, revealing normal EF with 25% in-stent stenosis in the left anterior descending coronary artery and 50% stenosis in the first obtuse marginal branch of the circumflex.  d. Left heart catheterization, 06/16/2009: 25% in-stent restenosis of the LAD, normal EF.  e. Normal nuclear perfusion study, 03/19/2015, Nora Guzmán MD, revealing no evidence of reducible ischemia, normal LV systolic function and wall motion.   2. Carotid artery disease:  a. Asymptomatic.  b. Carotid duplex, 08/11/2012: 40% to 60% stenosis of the left ICA, less than 40% of the right ICA.  c. Carotid ultrasound, 10/04/2013: No change from previous exams, with 30% stenosis on the right carotid artery and 50% on the left.  3. Hypercholesterolemia.  4. Chronic back pain.  5. Recent smoking cessation.  6. Mild anxiety.  7. Acid reflux.  8. Chronic obstructive pulmonary disease.  9. Surgical history: Remote appendectomy.    ASSESSMENT:   Diagnosis Plan   1. Coronary artery disease involving native coronary artery of native heart without angina pectoris     2. Hypercholesterolemia     3. Bilateral carotid artery disease         PLAN:  Coronary artery disease: Currently stable and asymptomatic.  Continue aspirin and statin.  Afterload well controlled.  Previously intolerant to high intensity statin therapy.    Hyper cholesterolemia: Goal LDL less than 70.  Continue statin.    Peripheral vascular disease: bilateral carotid ultrasound  "imaging pending.    Subjective  Reports stable functional capacity.  Has had some intermittent mild orthostasis.  Has had more frequent mechanical falls.  Currently being treated for back pain by pain clinic.  Blood pressures running less than 140/90 mmHg.  He is compliant with medical therapy.  Denies easy bruising or bleeding complications.  No myalgias on statin therapy.    PHYSICAL EXAMINATION:  Vitals:    03/20/18 1517   BP: 140/80   BP Location: Left arm   Patient Position: Sitting   Pulse: 92   Weight: 77.2 kg (170 lb 3.2 oz)   Height: 170.2 cm (67\")     General Appearance:    Alert, cooperative, no distress, appears stated age   Head:    Normocephalic, without obvious abnormality, atraumatic   Eyes:    conjunctiva/corneas clear   Nose:   Nares normal, septum midline, mucosa normal, no drainage   Throat:   Lips, teeth and gums normal   Neck:   Supple, symmetrical, trachea midline, no carotid    bruit or JVD   Lungs:     Clear to auscultation bilaterally, respirations unlabored   Chest Wall:    No tenderness or deformity    Heart:    Regular rate and rhythm, S1 and S2 normal, no murmur, rub   or gallop, normal carotid impulse bilaterally without bruit.   Abdomen:     Soft, non-tender   Extremities:   Extremities normal, atraumatic, no cyanosis or edema   Pulses:   2+ and symmetric all extremities   Skin:   Skin color, texture, turgor normal, no rashes or lesions       Diagnostic Data:  Procedures  No results found for: CHLPL, TRIG, HDL, LDLDIRECT  No results found for: GLUCOSE, BUN, CREATININE, NA, K, CL, CO2, CREATININE, BUN  No results found for: HGBA1C  No results found for: WBC, HGB, HCT, PLT    Allergies  Allergies   Allergen Reactions   • Metoprolol Shortness Of Breath   • Cetirizine      Insomnia   • Fluoxetine      Dizziness and stomach pain   • Promethazine      itching   • Rosuvastatin Other (See Comments)     Myalgias   • Sulfa Antibiotics      Oral blisters, diarrhea   • Varenicline Swelling     " Throat swelling   • Penicillins Rash       Current Medications    Current Outpatient Prescriptions:   •  albuterol (PROVENTIL HFA;VENTOLIN HFA) 108 (90 Base) MCG/ACT inhaler, Inhale 2 puffs Every 4 (Four) Hours As Needed for Wheezing., Disp: , Rfl:   •  ALPRAZolam (XANAX) 2 MG tablet, Take 2 mg by mouth 2 (Two) Times a Day As Needed for Anxiety., Disp: , Rfl:   •  aspirin 81 MG tablet, Take 1 tablet by mouth Daily., Disp: 30 tablet, Rfl: 11  •  cyclobenzaprine (FLEXERIL) 10 MG tablet, Take 10 mg by mouth Daily., Disp: , Rfl:   •  fluticasone (FLONASE) 50 MCG/ACT nasal spray, 2 sprays into each nostril Daily., Disp: , Rfl:   •  gabapentin (NEURONTIN) 800 MG tablet, Take 800 mg by mouth 4 (Four) Times a Day., Disp: , Rfl:   •  HYDROcodone-acetaminophen (NORCO)  MG per tablet, Take 1 tablet by mouth Every 6 (Six) Hours As Needed for Moderate Pain (4-6)., Disp: , Rfl:   •  nitroglycerin (NITROSTAT) 0.4 MG SL tablet, 1 under the tongue as needed for angina, may repeat q5mins for up three doses, Disp: 100 tablet, Rfl: 11  •  PARoxetine (PAXIL) 20 MG tablet, Take 20 mg by mouth Every Morning., Disp: , Rfl:   •  pravastatin (PRAVACHOL) 40 MG tablet, Take 1 tablet by mouth Daily. (Patient taking differently: Take 40 mg by mouth Daily. 1/2 tab daily), Disp: 90 tablet, Rfl: 4          ROS  Review of Systems   Cardiovascular: Positive for chest pain.   Respiratory: Positive for shortness of breath and snoring.          SOCIAL HX  Social History     Social History   • Marital status:      Spouse name: N/A   • Number of children: N/A   • Years of education: N/A     Occupational History   • Not on file.     Social History Main Topics   • Smoking status: Former Smoker     Quit date: 01/2018   • Smokeless tobacco: Never Used   • Alcohol use No   • Drug use: No   • Sexual activity: Defer     Other Topics Concern   • Not on file     Social History Narrative   • No narrative on file       FAMILY HX  Family History    Problem Relation Age of Onset   • Diabetes Father    • Heart disease Father    • Stroke Brother    • Coronary artery disease Other    • Diabetes Other    • Diabetes Mother              Derek Jackson III, MD, FACC

## 2018-03-21 DIAGNOSIS — I65.23 BILATERAL CAROTID ARTERY STENOSIS: ICD-10-CM

## 2018-03-21 DIAGNOSIS — I77.9 BILATERAL CAROTID ARTERY DISEASE (HCC): Primary | ICD-10-CM

## 2018-03-23 ENCOUNTER — HOSPITAL ENCOUNTER (OUTPATIENT)
Dept: GENERAL RADIOLOGY | Age: 68
Discharge: HOME | End: 2018-03-23
Attending: INTERNAL MEDICINE | Admitting: INTERNAL MEDICINE

## 2018-03-23 DIAGNOSIS — I65.23 OCCLUSION AND STENOSIS OF BILATERAL CAROTID ARTERIES: ICD-10-CM

## 2018-03-23 DIAGNOSIS — I65.29 STENOSIS OF CAROTID ARTERY, UNSPECIFIED LATERALITY: ICD-10-CM

## 2018-03-28 ENCOUNTER — OFFICE VISIT (OUTPATIENT)
Dept: PRIMARY CARE CLINIC | Age: 68
End: 2018-03-28
Payer: MEDICARE

## 2018-03-28 VITALS
SYSTOLIC BLOOD PRESSURE: 140 MMHG | HEART RATE: 74 BPM | OXYGEN SATURATION: 96 % | BODY MASS INDEX: 26.47 KG/M2 | DIASTOLIC BLOOD PRESSURE: 70 MMHG | WEIGHT: 169 LBS

## 2018-03-28 DIAGNOSIS — M25.551 RIGHT HIP PAIN: ICD-10-CM

## 2018-03-28 DIAGNOSIS — F41.9 ANXIETY: Primary | ICD-10-CM

## 2018-03-28 PROCEDURE — 3017F COLORECTAL CA SCREEN DOC REV: CPT | Performed by: NURSE PRACTITIONER

## 2018-03-28 PROCEDURE — G8484 FLU IMMUNIZE NO ADMIN: HCPCS | Performed by: NURSE PRACTITIONER

## 2018-03-28 PROCEDURE — 1123F ACP DISCUSS/DSCN MKR DOCD: CPT | Performed by: NURSE PRACTITIONER

## 2018-03-28 PROCEDURE — 1036F TOBACCO NON-USER: CPT | Performed by: NURSE PRACTITIONER

## 2018-03-28 PROCEDURE — G8599 NO ASA/ANTIPLAT THER USE RNG: HCPCS | Performed by: NURSE PRACTITIONER

## 2018-03-28 PROCEDURE — G8417 CALC BMI ABV UP PARAM F/U: HCPCS | Performed by: NURSE PRACTITIONER

## 2018-03-28 PROCEDURE — G8427 DOCREV CUR MEDS BY ELIG CLIN: HCPCS | Performed by: NURSE PRACTITIONER

## 2018-03-28 PROCEDURE — 99213 OFFICE O/P EST LOW 20 MIN: CPT | Performed by: NURSE PRACTITIONER

## 2018-03-28 PROCEDURE — 4040F PNEUMOC VAC/ADMIN/RCVD: CPT | Performed by: NURSE PRACTITIONER

## 2018-03-28 RX ORDER — ALPRAZOLAM 2 MG/1
TABLET ORAL
Qty: 60 TABLET | Refills: 0 | Status: SHIPPED | OUTPATIENT
Start: 2018-03-28 | End: 2018-04-24 | Stop reason: SDUPTHER

## 2018-03-28 RX ORDER — CITALOPRAM 20 MG/1
20 TABLET ORAL DAILY
Qty: 30 TABLET | Refills: 5 | Status: SHIPPED | OUTPATIENT
Start: 2018-03-28 | End: 2018-05-25 | Stop reason: SDUPTHER

## 2018-03-28 ASSESSMENT — ENCOUNTER SYMPTOMS
SORE THROAT: 0
VOMITING: 0
EYE PAIN: 0
ABDOMINAL PAIN: 0
SHORTNESS OF BREATH: 0
NAUSEA: 0
COUGH: 0

## 2018-03-29 NOTE — PROGRESS NOTES
Have you seen any other physician or provider since your last visit? no    Have you had any other diagnostic tests since your last visit? no    Have you changed or stopped any medications since your last visit including any over-the-counter medicines, vitamins, or herbal medicines? no     Are you taking all your prescribed medications? Yes  If NO, why? -  N/A      REVIEW OF SYSTEMS:  Review of Systems   Constitutional: Negative for chills and fever. HENT: Negative for ear pain and sore throat. Eyes: Negative for pain and visual disturbance. Respiratory: Negative for cough and shortness of breath. Cardiovascular: Negative for chest pain, palpitations and leg swelling. Gastrointestinal: Negative for abdominal pain, nausea and vomiting. Genitourinary: Negative for dysuria and hematuria. Musculoskeletal: Negative for joint swelling. Skin: Negative for rash. Neurological: Negative for dizziness and weakness. Psychiatric/Behavioral: Negative for sleep disturbance. The patient is nervous/anxious.
MD   fluticasone (FLONASE) 50 MCG/ACT nasal spray 1 spray by Nasal route daily Yes Ella Jung DO   pravastatin (PRAVACHOL) 80 MG tablet TAKE ONE TABLET BY MOUTH EVERY DAY Yes VIKI Puckett   HYDROcodone-acetaminophen (NORCO)  MG per tablet Take 1 tablet by mouth every 6 hours as needed. Indications: from pain clinic Yes Historical Provider, MD       ASSESSMENT:  1. Anxiety    2. Right hip pain          PLAN:    Orders Placed This Encounter   Medications    ALPRAZolam (XANAX) 2 MG tablet     Sig: TAKE ONE TABLET BY MOUTH TWO TIMES A DAY AS NEEDED FOR SLEEP- hold until due. Dispense:  60 tablet     Refill:  0    citalopram (CELEXA) 20 MG tablet     Sig: Take 1 tablet by mouth daily     Dispense:  30 tablet     Refill:  5     Stop Paxil. Return in about 4 weeks (around 4/25/2018).

## 2018-04-24 RX ORDER — ALPRAZOLAM 2 MG/1
TABLET ORAL
Qty: 60 TABLET | Refills: 0 | Status: SHIPPED | OUTPATIENT
Start: 2018-04-24 | End: 2018-05-25 | Stop reason: SDUPTHER

## 2018-05-25 ENCOUNTER — OFFICE VISIT (OUTPATIENT)
Dept: PRIMARY CARE CLINIC | Age: 68
End: 2018-05-25
Payer: MEDICARE

## 2018-05-25 VITALS
HEART RATE: 94 BPM | WEIGHT: 174 LBS | SYSTOLIC BLOOD PRESSURE: 110 MMHG | BODY MASS INDEX: 27.25 KG/M2 | OXYGEN SATURATION: 92 % | DIASTOLIC BLOOD PRESSURE: 60 MMHG

## 2018-05-25 DIAGNOSIS — F41.9 ANXIETY: Primary | ICD-10-CM

## 2018-05-25 DIAGNOSIS — J01.90 ACUTE SINUSITIS, RECURRENCE NOT SPECIFIED, UNSPECIFIED LOCATION: ICD-10-CM

## 2018-05-25 PROCEDURE — 1036F TOBACCO NON-USER: CPT | Performed by: NURSE PRACTITIONER

## 2018-05-25 PROCEDURE — G8427 DOCREV CUR MEDS BY ELIG CLIN: HCPCS | Performed by: NURSE PRACTITIONER

## 2018-05-25 PROCEDURE — 1123F ACP DISCUSS/DSCN MKR DOCD: CPT | Performed by: NURSE PRACTITIONER

## 2018-05-25 PROCEDURE — 99213 OFFICE O/P EST LOW 20 MIN: CPT | Performed by: NURSE PRACTITIONER

## 2018-05-25 PROCEDURE — 4040F PNEUMOC VAC/ADMIN/RCVD: CPT | Performed by: NURSE PRACTITIONER

## 2018-05-25 PROCEDURE — G8599 NO ASA/ANTIPLAT THER USE RNG: HCPCS | Performed by: NURSE PRACTITIONER

## 2018-05-25 PROCEDURE — G8417 CALC BMI ABV UP PARAM F/U: HCPCS | Performed by: NURSE PRACTITIONER

## 2018-05-25 PROCEDURE — 3017F COLORECTAL CA SCREEN DOC REV: CPT | Performed by: NURSE PRACTITIONER

## 2018-05-25 RX ORDER — ALPRAZOLAM 2 MG/1
TABLET ORAL
Qty: 60 TABLET | Refills: 0 | Status: SHIPPED | OUTPATIENT
Start: 2018-05-25 | End: 2018-06-22 | Stop reason: SDUPTHER

## 2018-05-25 RX ORDER — CITALOPRAM 40 MG/1
40 TABLET ORAL DAILY
Qty: 30 TABLET | Refills: 5 | Status: SHIPPED | OUTPATIENT
Start: 2018-05-25 | End: 2018-11-19 | Stop reason: SDUPTHER

## 2018-05-25 RX ORDER — AZITHROMYCIN 250 MG/1
TABLET, FILM COATED ORAL
Qty: 6 TABLET | Refills: 0 | Status: SHIPPED | OUTPATIENT
Start: 2018-05-25 | End: 2018-06-04

## 2018-05-25 ASSESSMENT — ENCOUNTER SYMPTOMS
SHORTNESS OF BREATH: 0
SORE THROAT: 0
ABDOMINAL PAIN: 0
EYE PAIN: 0
NAUSEA: 0
VOMITING: 0

## 2018-06-17 ASSESSMENT — ENCOUNTER SYMPTOMS: COUGH: 1

## 2018-06-22 ENCOUNTER — OFFICE VISIT (OUTPATIENT)
Dept: PRIMARY CARE CLINIC | Age: 68
End: 2018-06-22
Payer: MEDICARE

## 2018-06-22 VITALS
OXYGEN SATURATION: 96 % | HEART RATE: 91 BPM | WEIGHT: 164 LBS | BODY MASS INDEX: 25.69 KG/M2 | SYSTOLIC BLOOD PRESSURE: 130 MMHG | DIASTOLIC BLOOD PRESSURE: 70 MMHG

## 2018-06-22 DIAGNOSIS — M54.9 CHRONIC BACK PAIN, UNSPECIFIED BACK LOCATION, UNSPECIFIED BACK PAIN LATERALITY: ICD-10-CM

## 2018-06-22 DIAGNOSIS — E11.9 TYPE 2 DIABETES MELLITUS WITHOUT COMPLICATION, UNSPECIFIED LONG TERM INSULIN USE STATUS: ICD-10-CM

## 2018-06-22 DIAGNOSIS — R35.1 NOCTURIA: ICD-10-CM

## 2018-06-22 DIAGNOSIS — E78.5 HYPERLIPIDEMIA, UNSPECIFIED HYPERLIPIDEMIA TYPE: ICD-10-CM

## 2018-06-22 DIAGNOSIS — I25.119 CORONARY ARTERY DISEASE INVOLVING NATIVE HEART WITH ANGINA PECTORIS, UNSPECIFIED VESSEL OR LESION TYPE (HCC): ICD-10-CM

## 2018-06-22 DIAGNOSIS — G89.29 CHRONIC BACK PAIN, UNSPECIFIED BACK LOCATION, UNSPECIFIED BACK PAIN LATERALITY: ICD-10-CM

## 2018-06-22 DIAGNOSIS — F41.9 ANXIETY: Primary | ICD-10-CM

## 2018-06-22 PROCEDURE — 3017F COLORECTAL CA SCREEN DOC REV: CPT | Performed by: NURSE PRACTITIONER

## 2018-06-22 PROCEDURE — G8599 NO ASA/ANTIPLAT THER USE RNG: HCPCS | Performed by: NURSE PRACTITIONER

## 2018-06-22 PROCEDURE — 3044F HG A1C LEVEL LT 7.0%: CPT | Performed by: NURSE PRACTITIONER

## 2018-06-22 PROCEDURE — 1036F TOBACCO NON-USER: CPT | Performed by: NURSE PRACTITIONER

## 2018-06-22 PROCEDURE — 1123F ACP DISCUSS/DSCN MKR DOCD: CPT | Performed by: NURSE PRACTITIONER

## 2018-06-22 PROCEDURE — 4040F PNEUMOC VAC/ADMIN/RCVD: CPT | Performed by: NURSE PRACTITIONER

## 2018-06-22 PROCEDURE — 2022F DILAT RTA XM EVC RTNOPTHY: CPT | Performed by: NURSE PRACTITIONER

## 2018-06-22 PROCEDURE — 99214 OFFICE O/P EST MOD 30 MIN: CPT | Performed by: NURSE PRACTITIONER

## 2018-06-22 PROCEDURE — G8417 CALC BMI ABV UP PARAM F/U: HCPCS | Performed by: NURSE PRACTITIONER

## 2018-06-22 PROCEDURE — G8427 DOCREV CUR MEDS BY ELIG CLIN: HCPCS | Performed by: NURSE PRACTITIONER

## 2018-06-22 RX ORDER — GABAPENTIN 800 MG/1
TABLET ORAL
Qty: 120 TABLET | Refills: 5 | Status: SHIPPED | OUTPATIENT
Start: 2018-06-22 | End: 2018-10-19 | Stop reason: ALTCHOICE

## 2018-06-22 RX ORDER — ALPRAZOLAM 2 MG/1
TABLET ORAL
Qty: 60 TABLET | Refills: 0 | Status: SHIPPED | OUTPATIENT
Start: 2018-06-22 | End: 2018-07-19 | Stop reason: SDUPTHER

## 2018-06-22 RX ORDER — TAMSULOSIN HYDROCHLORIDE 0.4 MG/1
0.4 CAPSULE ORAL NIGHTLY
Qty: 30 CAPSULE | Refills: 5 | Status: SHIPPED | OUTPATIENT
Start: 2018-06-22 | End: 2018-11-19 | Stop reason: ALTCHOICE

## 2018-06-22 ASSESSMENT — ENCOUNTER SYMPTOMS
VOMITING: 0
COUGH: 0
NAUSEA: 0
ABDOMINAL PAIN: 0
SHORTNESS OF BREATH: 0
SORE THROAT: 0
EYE PAIN: 0

## 2018-06-22 ASSESSMENT — PATIENT HEALTH QUESTIONNAIRE - PHQ9
SUM OF ALL RESPONSES TO PHQ9 QUESTIONS 1 & 2: 2
1. LITTLE INTEREST OR PLEASURE IN DOING THINGS: 1
SUM OF ALL RESPONSES TO PHQ QUESTIONS 1-9: 2
2. FEELING DOWN, DEPRESSED OR HOPELESS: 1

## 2018-06-26 ASSESSMENT — ENCOUNTER SYMPTOMS: BACK PAIN: 1

## 2018-06-27 ENCOUNTER — HOSPITAL ENCOUNTER (OUTPATIENT)
Dept: OTHER | Age: 68
Discharge: HOME | End: 2018-06-27
Attending: NURSE PRACTITIONER | Admitting: NURSE PRACTITIONER

## 2018-06-27 ENCOUNTER — TELEPHONE (OUTPATIENT)
Dept: PRIMARY CARE CLINIC | Age: 68
End: 2018-06-27

## 2018-06-27 LAB
A/G RATIO: 1.9 (ref 0.8–2)
ALBUMIN SERPL-MCNC: 4 G/DL (ref 3.4–4.8)
ALP BLD-CCNC: 80 U/L (ref 25–100)
ALT SERPL-CCNC: 18 U/L (ref 4–36)
ANION GAP SERPL CALCULATED.3IONS-SCNC: 11 MMOL/L (ref 3–16)
AST SERPL-CCNC: 18 U/L (ref 8–33)
BASOPHILS ABSOLUTE: 0 K/UL (ref 0–0.1)
BASOPHILS RELATIVE PERCENT: 0.5 %
BILIRUB SERPL-MCNC: 0.3 MG/DL (ref 0.3–1.2)
BUN BLDV-MCNC: 5 MG/DL (ref 6–20)
CALCIUM SERPL-MCNC: 8.9 MG/DL (ref 8.5–10.5)
CHLORIDE BLD-SCNC: 96 MMOL/L (ref 98–107)
CHOLESTEROL, TOTAL: 167 MG/DL (ref 0–200)
CO2: 32 MMOL/L (ref 20–30)
CREAT SERPL-MCNC: 0.9 MG/DL (ref 0.4–1.2)
EOSINOPHILS ABSOLUTE: 0.1 K/UL (ref 0–0.4)
EOSINOPHILS RELATIVE PERCENT: 2.1 %
GFR AFRICAN AMERICAN: >59
GFR NON-AFRICAN AMERICAN: >59
GLOBULIN: 2.1 G/DL
GLUCOSE BLD-MCNC: 113 MG/DL (ref 74–106)
HBA1C MFR BLD: 6.3 %
HCT VFR BLD CALC: 47.9 % (ref 40–54)
HDLC SERPL-MCNC: 43 MG/DL (ref 40–60)
HEMOGLOBIN: 15.9 G/DL (ref 13–18)
IMMATURE GRANULOCYTES #: 0 K/UL
IMMATURE GRANULOCYTES %: 0.3 % (ref 0–5)
LDL CHOLESTEROL CALCULATED: 87 MG/DL
LYMPHOCYTES ABSOLUTE: 1.7 K/UL (ref 1.5–4)
LYMPHOCYTES RELATIVE PERCENT: 27.7 %
MCH RBC QN AUTO: 31.7 PG (ref 27–32)
MCHC RBC AUTO-ENTMCNC: 33.2 G/DL (ref 31–35)
MCV RBC AUTO: 95.4 FL (ref 80–100)
MONOCYTES ABSOLUTE: 0.4 K/UL (ref 0.2–0.8)
MONOCYTES RELATIVE PERCENT: 5.9 %
NEUTROPHILS ABSOLUTE: 3.9 K/UL (ref 2–7.5)
NEUTROPHILS RELATIVE PERCENT: 63.5 %
PDW BLD-RTO: 13.2 % (ref 11–16)
PLATELET # BLD: 211 K/UL (ref 150–400)
PMV BLD AUTO: 9.1 FL (ref 6–10)
POTASSIUM SERPL-SCNC: 4 MMOL/L (ref 3.4–5.1)
RBC # BLD: 5.02 M/UL (ref 4.5–6)
SODIUM BLD-SCNC: 139 MMOL/L (ref 136–145)
TOTAL PROTEIN: 6.1 G/DL (ref 6.4–8.3)
TRIGL SERPL-MCNC: 187 MG/DL (ref 0–249)
VLDLC SERPL CALC-MCNC: 37 MG/DL
WBC # BLD: 6.1 K/UL (ref 4–11)

## 2018-07-19 DIAGNOSIS — F41.9 ANXIETY: ICD-10-CM

## 2018-07-19 RX ORDER — ALPRAZOLAM 2 MG/1
TABLET ORAL
Qty: 60 TABLET | Refills: 0 | Status: SHIPPED | OUTPATIENT
Start: 2018-07-19 | End: 2018-08-15 | Stop reason: SDUPTHER

## 2018-07-19 NOTE — TELEPHONE ENCOUNTER
Requested Prescriptions     Pending Prescriptions Disp Refills    ALPRAZolam (XANAX) 2 MG tablet 60 tablet 0     Sig: TAKE ONE TABLET BY MOUTH TWO TIMES A DAY AS NEEDED FOR SLEEP.      Health Maintenance   Topic Date Due    AAA screen  1950    Hepatitis C screen  1950    Diabetic foot exam  12/26/1959    Diabetic retinal exam  12/26/1959    DTaP/Tdap/Td vaccine (1 - Tdap) 12/26/1968    Shingles Vaccine (1 of 2 - 2 Dose Series) 12/26/1999    Low dose CT lung screening  12/26/2004    Pneumococcal low/med risk (2 of 2 - PCV13) 10/26/2016    Flu vaccine (1) 09/01/2018    Diabetic microalbuminuria test  11/17/2018    A1C test (Diabetic or Prediabetic)  06/27/2019    Lipid screen  06/27/2019    Colon cancer screen colonoscopy  09/01/2026             (applicable per patient's age: Cancer Screenings, Depression Screening, Fall Risk Screening, Immunizations)    Hemoglobin A1C (%)   Date Value   06/27/2018 6.3 (H)   02/01/2018 6.9   08/17/2017 6.2     Microalbumin, Random Urine (mg/dL)   Date Value   11/17/2017 <1.20     LDL Cholesterol (no units)   Date Value   02/25/2011 90.0     LDL Calculated (mg/dL)   Date Value   06/27/2018 87     AST (U/L)   Date Value   06/27/2018 18     ALT (U/L)   Date Value   06/27/2018 18     BUN (mg/dL)   Date Value   06/27/2018 5 (L)      (goal A1C is < 7)   (goal LDL is <100) need 30-50% reduction from baseline     BP Readings from Last 3 Encounters:   06/22/18 130/70   05/25/18 110/60   03/28/18 (!) 140/70    (goal /80)      All Future Testing planned in [facility]:  Lab Frequency Next Occurrence       Next Visit Date:  Future Appointments  Date Time Provider Alek Loera   9/21/2018 10:00 AM VIKI Bravo PC [facility]            Patient Active Problem List:     CAD (coronary artery disease)     Hyperlipidemia     Chronic low back pain     DDD (degenerative disc disease), lumbar     Anxiety     Carotid artery disease ([address]     GERD

## 2018-08-15 DIAGNOSIS — F41.9 ANXIETY: ICD-10-CM

## 2018-08-15 RX ORDER — ALPRAZOLAM 2 MG/1
TABLET ORAL
Qty: 60 TABLET | Refills: 0 | Status: SHIPPED | OUTPATIENT
Start: 2018-08-15 | End: 2018-09-13 | Stop reason: SDUPTHER

## 2018-08-27 RX ORDER — ALBUTEROL SULFATE 90 UG/1
AEROSOL, METERED RESPIRATORY (INHALATION)
Qty: 8.5 G | Refills: 5 | Status: SHIPPED | OUTPATIENT
Start: 2018-08-27 | End: 2019-01-16 | Stop reason: SDUPTHER

## 2018-08-27 NOTE — TELEPHONE ENCOUNTER
Requested Prescriptions     Pending Prescriptions Disp Refills    albuterol sulfate HFA (PROAIR HFA) 108 (90 Base) MCG/ACT inhaler 8.5 g 5     Sig: INHALE 2 PUFFS INTO THE LUNGS EVERY 6 HOURS AS NEEDED FOR WHEEZING     Health Maintenance   Topic Date Due    AAA screen  1950    Hepatitis C screen  1950    Diabetic foot exam  12/26/1959    Diabetic retinal exam  12/26/1959    DTaP/Tdap/Td vaccine (1 - Tdap) 12/26/1968    Shingles Vaccine (1 of 2 - 2 Dose Series) 12/26/1999    Low dose CT lung screening  12/26/2004    Pneumococcal low/med risk (2 of 2 - PCV13) 10/26/2016    Flu vaccine (1) 09/01/2018    Diabetic microalbuminuria test  11/17/2018    A1C test (Diabetic or Prediabetic)  06/27/2019    Lipid screen  06/27/2019    Colon cancer screen colonoscopy  09/01/2026             (applicable per patient's age: Cancer Screenings, Depression Screening, Fall Risk Screening, Immunizations)    Hemoglobin A1C (%)   Date Value   06/27/2018 6.3 (H)   02/01/2018 6.9   08/17/2017 6.2     Microalbumin, Random Urine (mg/dL)   Date Value   11/17/2017 <1.20     LDL Cholesterol (no units)   Date Value   02/25/2011 90.0     LDL Calculated (mg/dL)   Date Value   06/27/2018 87     AST (U/L)   Date Value   06/27/2018 18     ALT (U/L)   Date Value   06/27/2018 18     BUN (mg/dL)   Date Value   06/27/2018 5 (L)      (goal A1C is < 7)   (goal LDL is <100) need 30-50% reduction from baseline     BP Readings from Last 3 Encounters:   06/22/18 130/70   05/25/18 110/60   03/28/18 (!) 140/70    (goal /80)      All Future Testing planned in [facility]:  Lab Frequency Next Occurrence       Next Visit Date:  Future Appointments  Date Time Provider Alek Loera   9/21/2018 10:00 AM VIKI Aguayo [facility]            Patient Active Problem List:     CAD (coronary artery disease)     Hyperlipidemia     Chronic low back pain     DDD (degenerative disc disease), lumbar     Anxiety     Carotid artery disease ([facility]     GERD (gastroesophageal reflux disease)     Wheezing     Acute bacterial sinusitis

## 2018-09-13 DIAGNOSIS — F41.9 ANXIETY: ICD-10-CM

## 2018-09-13 RX ORDER — ALPRAZOLAM 2 MG/1
TABLET ORAL
Qty: 60 TABLET | Refills: 0 | Status: SHIPPED | OUTPATIENT
Start: 2018-09-13 | End: 2018-10-09 | Stop reason: SDUPTHER

## 2018-09-21 ENCOUNTER — OFFICE VISIT (OUTPATIENT)
Dept: PRIMARY CARE CLINIC | Age: 68
End: 2018-09-21
Payer: MEDICARE

## 2018-09-21 VITALS
WEIGHT: 153 LBS | HEART RATE: 101 BPM | SYSTOLIC BLOOD PRESSURE: 140 MMHG | DIASTOLIC BLOOD PRESSURE: 70 MMHG | OXYGEN SATURATION: 92 % | BODY MASS INDEX: 23.96 KG/M2

## 2018-09-21 DIAGNOSIS — M54.9 CHRONIC BACK PAIN, UNSPECIFIED BACK LOCATION, UNSPECIFIED BACK PAIN LATERALITY: ICD-10-CM

## 2018-09-21 DIAGNOSIS — F41.9 ANXIETY: Primary | ICD-10-CM

## 2018-09-21 DIAGNOSIS — K21.9 GASTROESOPHAGEAL REFLUX DISEASE, ESOPHAGITIS PRESENCE NOT SPECIFIED: ICD-10-CM

## 2018-09-21 DIAGNOSIS — Z79.899 POLYPHARMACY: ICD-10-CM

## 2018-09-21 DIAGNOSIS — G89.29 CHRONIC BACK PAIN, UNSPECIFIED BACK LOCATION, UNSPECIFIED BACK PAIN LATERALITY: ICD-10-CM

## 2018-09-21 PROCEDURE — 1036F TOBACCO NON-USER: CPT | Performed by: NURSE PRACTITIONER

## 2018-09-21 PROCEDURE — 99213 OFFICE O/P EST LOW 20 MIN: CPT | Performed by: NURSE PRACTITIONER

## 2018-09-21 PROCEDURE — 3017F COLORECTAL CA SCREEN DOC REV: CPT | Performed by: NURSE PRACTITIONER

## 2018-09-21 PROCEDURE — 4040F PNEUMOC VAC/ADMIN/RCVD: CPT | Performed by: NURSE PRACTITIONER

## 2018-09-21 PROCEDURE — 1123F ACP DISCUSS/DSCN MKR DOCD: CPT | Performed by: NURSE PRACTITIONER

## 2018-09-21 PROCEDURE — G8427 DOCREV CUR MEDS BY ELIG CLIN: HCPCS | Performed by: NURSE PRACTITIONER

## 2018-09-21 PROCEDURE — G8599 NO ASA/ANTIPLAT THER USE RNG: HCPCS | Performed by: NURSE PRACTITIONER

## 2018-09-21 PROCEDURE — 1101F PT FALLS ASSESS-DOCD LE1/YR: CPT | Performed by: NURSE PRACTITIONER

## 2018-09-21 PROCEDURE — G8420 CALC BMI NORM PARAMETERS: HCPCS | Performed by: NURSE PRACTITIONER

## 2018-09-21 RX ORDER — RANITIDINE 150 MG/1
150 TABLET ORAL 2 TIMES DAILY PRN
Qty: 60 TABLET | Refills: 5 | Status: SHIPPED | OUTPATIENT
Start: 2018-09-21 | End: 2018-11-19 | Stop reason: ALTCHOICE

## 2018-09-21 ASSESSMENT — ENCOUNTER SYMPTOMS
SORE THROAT: 0
NAUSEA: 0
VOMITING: 0
COUGH: 0
EYE PAIN: 0
ABDOMINAL PAIN: 0
SHORTNESS OF BREATH: 0

## 2018-09-21 NOTE — PROGRESS NOTES
Have you seen any other physician or provider since your last visit? no    Have you had any other diagnostic tests since your last visit? no    Have you changed or stopped any medications since your last visit including any over-the-counter medicines, vitamins, or herbal medicines? no     Are you taking all your prescribed medications? Yes  If NO, why? -  N/A      REVIEW OF SYSTEMS:  Review of Systems   Constitutional: Negative for chills and fever. HENT: Negative for ear pain and sore throat. Eyes: Negative for pain and visual disturbance. Respiratory: Negative for cough and shortness of breath. Cardiovascular: Negative for chest pain, palpitations and leg swelling. Gastrointestinal: Negative for abdominal pain, nausea and vomiting. Genitourinary: Negative for dysuria and hematuria. Musculoskeletal: Negative for joint swelling. Skin: Negative for rash. Neurological: Negative for dizziness and weakness. Psychiatric/Behavioral: Negative for sleep disturbance. The patient is nervous/anxious.

## 2018-09-24 RX ORDER — GLUCOSAMINE HCL/CHONDROITIN SU 500-400 MG
CAPSULE ORAL
Qty: 50 STRIP | Refills: 5 | Status: SHIPPED | OUTPATIENT
Start: 2018-09-24 | End: 2018-09-24 | Stop reason: SDUPTHER

## 2018-09-24 RX ORDER — GLUCOSAMINE HCL/CHONDROITIN SU 500-400 MG
CAPSULE ORAL
Qty: 50 STRIP | Refills: 5 | Status: SHIPPED | OUTPATIENT
Start: 2018-09-24 | End: 2019-11-20 | Stop reason: SDUPTHER

## 2018-09-27 ENCOUNTER — HOSPITAL ENCOUNTER (OUTPATIENT)
Facility: HOSPITAL | Age: 68
Discharge: HOME | End: 2018-09-27
Payer: MEDICARE

## 2018-09-27 ENCOUNTER — OFFICE VISIT (OUTPATIENT)
Dept: CARDIOLOGY | Facility: CLINIC | Age: 68
End: 2018-09-27

## 2018-09-27 VITALS
DIASTOLIC BLOOD PRESSURE: 75 MMHG | BODY MASS INDEX: 24.01 KG/M2 | WEIGHT: 153 LBS | SYSTOLIC BLOOD PRESSURE: 140 MMHG | HEIGHT: 67 IN | HEART RATE: 90 BPM

## 2018-09-27 DIAGNOSIS — E78.00 HYPERCHOLESTEROLEMIA: ICD-10-CM

## 2018-09-27 DIAGNOSIS — I77.9 BILATERAL CAROTID ARTERY DISEASE (HCC): ICD-10-CM

## 2018-09-27 DIAGNOSIS — I25.10 CORONARY ARTERY DISEASE INVOLVING NATIVE CORONARY ARTERY OF NATIVE HEART WITHOUT ANGINA PECTORIS: Primary | ICD-10-CM

## 2018-09-27 DIAGNOSIS — R07.2 PRECORDIAL PAIN: ICD-10-CM

## 2018-09-27 LAB
ANION GAP SERPL CALCULATED.3IONS-SCNC: 13 MMOL/L (ref 3–16)
BUN BLDV-MCNC: 6 MG/DL (ref 6–20)
CALCIUM SERPL-MCNC: 9.5 MG/DL (ref 8.5–10.5)
CHLORIDE BLD-SCNC: 99 MMOL/L (ref 98–107)
CO2: 29 MMOL/L (ref 20–30)
CREAT SERPL-MCNC: 0.9 MG/DL (ref 0.4–1.2)
GFR AFRICAN AMERICAN: >59
GFR NON-AFRICAN AMERICAN: >59
GLUCOSE BLD-MCNC: 90 MG/DL (ref 74–106)
HCT VFR BLD CALC: 49.8 % (ref 40–54)
HEMOGLOBIN: 16.6 G/DL (ref 13–18)
MCH RBC QN AUTO: 31.7 PG (ref 27–32)
MCHC RBC AUTO-ENTMCNC: 33.3 G/DL (ref 31–35)
MCV RBC AUTO: 95 FL (ref 80–100)
PDW BLD-RTO: 14.6 % (ref 11–16)
PLATELET # BLD: 212 K/UL (ref 150–400)
PMV BLD AUTO: 9.3 FL (ref 6–10)
POTASSIUM SERPL-SCNC: 4.4 MMOL/L (ref 3.4–5.1)
PRO-BNP: 162 PG/ML (ref 0–1800)
RBC # BLD: 5.24 M/UL (ref 4.5–6)
SODIUM BLD-SCNC: 141 MMOL/L (ref 136–145)
WBC # BLD: 6.1 K/UL (ref 4–11)

## 2018-09-27 PROCEDURE — 83880 ASSAY OF NATRIURETIC PEPTIDE: CPT

## 2018-09-27 PROCEDURE — 99214 OFFICE O/P EST MOD 30 MIN: CPT | Performed by: INTERNAL MEDICINE

## 2018-09-27 PROCEDURE — 80048 BASIC METABOLIC PNL TOTAL CA: CPT

## 2018-09-27 PROCEDURE — 36415 COLL VENOUS BLD VENIPUNCTURE: CPT

## 2018-09-27 PROCEDURE — 85027 COMPLETE CBC AUTOMATED: CPT

## 2018-09-27 RX ORDER — TAMSULOSIN HYDROCHLORIDE 0.4 MG/1
1 CAPSULE ORAL NIGHTLY
COMMUNITY

## 2018-09-27 RX ORDER — RANITIDINE 150 MG/1
150 TABLET ORAL 2 TIMES DAILY
COMMUNITY
End: 2019-01-10

## 2018-09-27 RX ORDER — CITALOPRAM 40 MG/1
40 TABLET ORAL DAILY
Refills: 5 | COMMUNITY
Start: 2018-09-13

## 2018-09-27 NOTE — PROGRESS NOTES
[facility]  Office visit  Geronimo Pizano  1950  [phone]    VISIT DATE:  03/20/2018    PCP: Dee Galindo MD  [facility] [place] [address]    CC:  No chief complaint on file.      PROBLEM LIST:  1. Coronary artery disease:  a. Remote bare-metal stent, April 1999.  b. Recurrent stenting of left anterior descending coronary artery in 2003.  c. Cardiac catheterization in August 2007, revealing normal EF with 25% in-stent stenosis in the left anterior descending coronary artery and 50% stenosis in the first obtuse marginal branch of the circumflex.  d. Left heart catheterization, 06/16/2009: 25% in-stent restenosis of the LAD, normal EF.  e. Normal nuclear perfusion study, 03/19/2015, Nora Guzmán MD, revealing no evidence of reducible ischemia, normal LV systolic function and wall motion.   2. Carotid artery disease:  a. Asymptomatic.  b. Carotid duplex, 08/11/2012: 40% to 60% stenosis of the left ICA, less than 40% of the right ICA.  c. Carotid ultrasound, 10/04/2013: No change from previous exams, with 30% stenosis on the right carotid artery and 50% on the left.  d. March 2018:1. Right internal carotid artery estimated diameter reduction:  20-49 %.  2. Left internal carotid artery estimated diameter reduction:  20-49 %. However, the ICA:ECA ratio is elevated suggesting that  this may represent a 50-69% stenosis similar to that seen on the  study of 12/27/2016.  3. Hypercholesterolemia.  4. Chronic back pain.  5. Recent smoking cessation.  6. Mild anxiety.  7. Acid reflux.  8. Chronic obstructive pulmonary disease.  9. Surgical history: Remote appendectomy.    ASSESSMENT:   Diagnosis Plan   1. Coronary artery disease involving native coronary artery of native heart without angina pectoris     2. Hypercholesterolemia     3. Bilateral carotid artery disease (CMS/HCC)         PLAN:  Coronary artery disease: Currently with intermittent angina class  II pattern. Lexiscan myocardial perfusion imaging pending for ischemia evaluation. Echo pending to assess underlying myocardial structure and function. Continue aspirin and statin.  Afterload well controlled.  Previously intolerant to high intensity statin therapy.    Hyper cholesterolemia: Goal LDL less than 70.  Continue statin.    Peripheral vascular disease: Mild bilateral carotid disease. Continue current medical therapy with carotid ultrasound imaging every 2-3 years.    Subjective  Currently being treated for back pain by pain clinic.  Blood pressures running less than 140/90 mmHg.  He is compliant with medical therapy.  Denies easy bruising or bleeding complications.  No myalgias on statin therapy. Appears to have a high level baseline anxiety. Had an episode of precordial chest discomfort with a smothering sensation while visiting his terminally ill sister within the past 4 weeks. Evaluation at [facility] emergency department was reportedly unremarkable. They recommended further cardiac testing but he declined at that time. He is continue to have intermittent episodes of precordial chest discomfort which are mild-to-moderate in intensity. No obvious triggers. No alleviating or exacerbating factors. His episodes tend to be associated with a smothering sensation as well.    PHYSICAL EXAMINATION:  There were no vitals filed for this visit.  General Appearance:    Alert, cooperative, no distress, appears stated age   Head:    Normocephalic, without obvious abnormality, atraumatic   Eyes:    conjunctiva/corneas clear   Nose:   Nares normal, septum midline, mucosa normal, no drainage   Throat:   Lips, teeth and gums normal   Neck:   Supple, symmetrical, trachea midline, no carotid    bruit or JVD   Lungs:     Clear to auscultation bilaterally, respirations unlabored   Chest Wall:    No tenderness or deformity    Heart:    Regular rate and rhythm, S1 and S2 normal, no murmur, rub   or gallop, normal carotid  impulse bilaterally without bruit.   Abdomen:     Soft, non-tender   Extremities:   Extremities normal, atraumatic, no cyanosis or edema   Pulses:   2+ and symmetric all extremities   Skin:   Skin color, texture, turgor normal, no rashes or lesions       Diagnostic Data:  Procedures  No results found for: CHLPL, TRIG, HDL, LDLDIRECT  No results found for: GLUCOSE, BUN, CREATININE, NA, K, CL, CO2, CREATININE, BUN  No results found for: HGBA1C  No results found for: WBC, HGB, HCT, PLT    Allergies  Allergies   Allergen Reactions   • Metoprolol Shortness Of Breath   • Cetirizine      Insomnia   • Fluoxetine      Dizziness and stomach pain   • Promethazine      itching   • Rosuvastatin Other (See Comments)     Myalgias   • Sulfa Antibiotics      Oral blisters, diarrhea   • Varenicline Swelling     Throat swelling   • Penicillins Rash       Current Medications    Current Outpatient Prescriptions:   •  albuterol (PROVENTIL HFA;VENTOLIN HFA) 108 (90 Base) MCG/ACT inhaler, Inhale 2 puffs Every 4 (Four) Hours As Needed for Wheezing., Disp: , Rfl:   •  ALPRAZolam (XANAX) 2 MG tablet, Take 2 mg by mouth 2 (Two) Times a Day As Needed for Anxiety., Disp: , Rfl:   •  aspirin 81 MG tablet, Take 1 tablet by mouth Daily., Disp: 30 tablet, Rfl: 11  •  cyclobenzaprine (FLEXERIL) 10 MG tablet, Take 10 mg by mouth Daily., Disp: , Rfl:   •  fluticasone (FLONASE) 50 MCG/ACT nasal spray, 2 sprays into each nostril Daily., Disp: , Rfl:   •  gabapentin (NEURONTIN) 800 MG tablet, Take 800 mg by mouth 4 (Four) Times a Day., Disp: , Rfl:   •  HYDROcodone-acetaminophen (NORCO)  MG per tablet, Take 1 tablet by mouth Every 6 (Six) Hours As Needed for Moderate Pain (4-6)., Disp: , Rfl:   •  nitroglycerin (NITROSTAT) 0.4 MG SL tablet, 1 under the tongue as needed for angina, may repeat q5mins for up three doses, Disp: 100 tablet, Rfl: 11  •  PARoxetine (PAXIL) 20 MG tablet, Take 20 mg by mouth Every Morning., Disp: , Rfl:   •  pravastatin  (PRAVACHOL) 40 MG tablet, Take 1 tablet by mouth Daily. (Patient taking differently: Take 40 mg by mouth Daily. 1/2 tab daily), Disp: 90 tablet, Rfl: 4          ROS  Review of Systems   Cardiovascular: Positive for chest pain.   Respiratory: Negative for shortness of breath and snoring.          SOCIAL HX  Social History     Social History   • Marital status:      Spouse name: N/A   • Number of children: N/A   • Years of education: N/A     Occupational History   • Not on file.     Social History Main Topics   • Smoking status: Former Smoker     Quit date: 01/2018   • Smokeless tobacco: Never Used   • Alcohol use No   • Drug use: No   • Sexual activity: Defer     Other Topics Concern   • Not on file     Social History Narrative   • No narrative on file       FAMILY HX  Family History   Problem Relation Age of Onset   • Diabetes Father    • Heart disease Father    • Stroke Brother    • Coronary artery disease Other    • Diabetes Other    • Diabetes Mother              Derek Jackson III, MD, FACC

## 2018-10-02 ENCOUNTER — OUTSIDE FACILITY SERVICE (OUTPATIENT)
Dept: CARDIOLOGY | Facility: CLINIC | Age: 68
End: 2018-10-02

## 2018-10-02 ENCOUNTER — HOSPITAL ENCOUNTER (OUTPATIENT)
Dept: NON INVASIVE DIAGNOSTICS | Facility: HOSPITAL | Age: 68
Discharge: HOME | End: 2018-10-02
Payer: MEDICARE

## 2018-10-02 LAB
LV EF: 63 %
LVEF MODALITY: NORMAL

## 2018-10-02 PROCEDURE — 93306 TTE W/DOPPLER COMPLETE: CPT

## 2018-10-02 PROCEDURE — 93306 TTE W/DOPPLER COMPLETE: CPT | Performed by: INTERNAL MEDICINE

## 2018-10-05 ENCOUNTER — OFFICE VISIT (OUTPATIENT)
Dept: PRIMARY CARE CLINIC | Age: 68
End: 2018-10-05
Payer: MEDICARE

## 2018-10-05 VITALS
SYSTOLIC BLOOD PRESSURE: 100 MMHG | OXYGEN SATURATION: 96 % | BODY MASS INDEX: 23.81 KG/M2 | WEIGHT: 152 LBS | HEART RATE: 93 BPM | DIASTOLIC BLOOD PRESSURE: 50 MMHG

## 2018-10-05 DIAGNOSIS — M54.9 CHRONIC BACK PAIN, UNSPECIFIED BACK LOCATION, UNSPECIFIED BACK PAIN LATERALITY: ICD-10-CM

## 2018-10-05 DIAGNOSIS — G89.29 CHRONIC BACK PAIN, UNSPECIFIED BACK LOCATION, UNSPECIFIED BACK PAIN LATERALITY: ICD-10-CM

## 2018-10-05 DIAGNOSIS — F41.9 ANXIETY: Primary | ICD-10-CM

## 2018-10-05 DIAGNOSIS — Z79.899 POLYPHARMACY: ICD-10-CM

## 2018-10-05 PROCEDURE — 99213 OFFICE O/P EST LOW 20 MIN: CPT | Performed by: NURSE PRACTITIONER

## 2018-10-05 PROCEDURE — 90688 IIV4 VACCINE SPLT 0.5 ML IM: CPT | Performed by: NURSE PRACTITIONER

## 2018-10-05 PROCEDURE — G8599 NO ASA/ANTIPLAT THER USE RNG: HCPCS | Performed by: NURSE PRACTITIONER

## 2018-10-05 PROCEDURE — 1036F TOBACCO NON-USER: CPT | Performed by: NURSE PRACTITIONER

## 2018-10-05 PROCEDURE — 1101F PT FALLS ASSESS-DOCD LE1/YR: CPT | Performed by: NURSE PRACTITIONER

## 2018-10-05 PROCEDURE — G8482 FLU IMMUNIZE ORDER/ADMIN: HCPCS | Performed by: NURSE PRACTITIONER

## 2018-10-05 PROCEDURE — G8428 CUR MEDS NOT DOCUMENT: HCPCS | Performed by: NURSE PRACTITIONER

## 2018-10-05 PROCEDURE — 3017F COLORECTAL CA SCREEN DOC REV: CPT | Performed by: NURSE PRACTITIONER

## 2018-10-05 PROCEDURE — G0008 ADMIN INFLUENZA VIRUS VAC: HCPCS | Performed by: NURSE PRACTITIONER

## 2018-10-05 PROCEDURE — 1123F ACP DISCUSS/DSCN MKR DOCD: CPT | Performed by: NURSE PRACTITIONER

## 2018-10-05 PROCEDURE — G8420 CALC BMI NORM PARAMETERS: HCPCS | Performed by: NURSE PRACTITIONER

## 2018-10-05 PROCEDURE — 4040F PNEUMOC VAC/ADMIN/RCVD: CPT | Performed by: NURSE PRACTITIONER

## 2018-10-05 RX ORDER — OXYCODONE AND ACETAMINOPHEN 10; 325 MG/1; MG/1
TABLET ORAL
COMMUNITY
End: 2018-10-25 | Stop reason: ALTCHOICE

## 2018-10-05 ASSESSMENT — ENCOUNTER SYMPTOMS
NAUSEA: 0
SORE THROAT: 0
ABDOMINAL PAIN: 0
COUGH: 0
VOMITING: 0
EYE PAIN: 0
SHORTNESS OF BREATH: 0

## 2018-10-07 ASSESSMENT — ENCOUNTER SYMPTOMS: BACK PAIN: 1

## 2018-10-07 NOTE — PROGRESS NOTES
SUBJECTIVE:    Patient ID: Priyank Calles is a 75 y.o. male. Medical history Review  Past Medical, Family, and Social History reviewed and does not contribute to the patient presenting condition    Health Maintenance Due   Topic Date Due    AAA screen  1950    Hepatitis C screen  1950    Diabetic foot exam  12/26/1959    Diabetic retinal exam  12/26/1959    DTaP/Tdap/Td vaccine (1 - Tdap) 12/26/1968    Shingles Vaccine (1 of 2 - 2 Dose Series) 12/26/1999    Low dose CT lung screening  12/26/2004    Pneumococcal low/med risk (2 of 2 - PCV13) 10/26/2016        HPI:   Chief Complaint   Patient presents with    Anxiety     Patient here today for anxiety. He states he has ringing in his ears and hears a little flute at times. He states he has lost a sister about a month and half ago, he doesn't know how to handle it. He states one of his medication is making him pee a lot, he doesn't know which one it is. He is also having a lot of indigestion and GERD. Patient's medications, allergies, past medical, surgical, social and family histories were reviewed and updated as appropriate. Review of Systems Reviewed and acurate. See MA note. OBJECTIVE:  BP (!) 140/70 (Site: Left Upper Arm, Position: Sitting, Cuff Size: Medium Adult)   Pulse 101   Wt 153 lb (69.4 kg)   SpO2 92%   BMI 23.96 kg/m²    Physical Exam   Constitutional: He is oriented to person, place, and time. He appears well-developed and well-nourished. No distress. HENT:   Head: Normocephalic. Right Ear: Tympanic membrane normal.   Left Ear: Tympanic membrane normal.   Mouth/Throat: No oropharyngeal exudate. Eyes: Lids are normal.   Neck: Neck supple. Cardiovascular: Normal rate, regular rhythm and normal heart sounds. Pulmonary/Chest: Effort normal and breath sounds normal.   Abdominal: Soft. Bowel sounds are normal. He exhibits no distension. There is no tenderness.    Musculoskeletal: He exhibits no to wean meds to see if this improves. Return in about 2 weeks (around 10/5/2018).

## 2018-10-09 DIAGNOSIS — F41.9 ANXIETY: ICD-10-CM

## 2018-10-10 RX ORDER — ALPRAZOLAM 2 MG/1
TABLET ORAL
Qty: 60 TABLET | Refills: 0 | Status: SHIPPED | OUTPATIENT
Start: 2018-10-10 | End: 2018-11-19 | Stop reason: SINTOL

## 2018-10-19 ENCOUNTER — TELEPHONE (OUTPATIENT)
Dept: PRIMARY CARE CLINIC | Age: 68
End: 2018-10-19

## 2018-10-19 ENCOUNTER — NURSE ONLY (OUTPATIENT)
Dept: PRIMARY CARE CLINIC | Age: 68
End: 2018-10-19

## 2018-10-19 NOTE — TELEPHONE ENCOUNTER
Called pt to come in and bring xanax for pill count he states that is all he gets from [facility] that she stopped writing neurotin and that he gets percocet from pain clinic he vu and states he will come in and bring xanax but he doesn't take them he has an apt with her to discuss changing them. Pt does have an apt 10/25  Scheduled that says to discuss nerve medication.

## 2018-10-21 ENCOUNTER — HOSPITAL ENCOUNTER (EMERGENCY)
Facility: HOSPITAL | Age: 68
Discharge: HOME | End: 2018-10-21
Attending: FAMILY MEDICINE
Payer: MEDICARE

## 2018-10-21 VITALS
OXYGEN SATURATION: 99 % | TEMPERATURE: 98.1 F | RESPIRATION RATE: 16 BRPM | BODY MASS INDEX: 23.54 KG/M2 | HEIGHT: 67 IN | HEART RATE: 98 BPM | WEIGHT: 150 LBS | SYSTOLIC BLOOD PRESSURE: 148 MMHG | DIASTOLIC BLOOD PRESSURE: 88 MMHG

## 2018-10-21 DIAGNOSIS — F41.1 ANXIETY STATE: Primary | ICD-10-CM

## 2018-10-21 DIAGNOSIS — R11.0 NAUSEA: ICD-10-CM

## 2018-10-21 PROCEDURE — 99283 EMERGENCY DEPT VISIT LOW MDM: CPT

## 2018-10-21 RX ORDER — ONDANSETRON 4 MG/1
4 TABLET, ORALLY DISINTEGRATING ORAL EVERY 8 HOURS PRN
Qty: 6 TABLET | Refills: 0 | Status: SHIPPED | OUTPATIENT
Start: 2018-10-21 | End: 2018-11-19 | Stop reason: ALTCHOICE

## 2018-10-21 ASSESSMENT — ENCOUNTER SYMPTOMS
NAUSEA: 1
SHORTNESS OF BREATH: 0
ABDOMINAL PAIN: 0
VOMITING: 0

## 2018-10-21 NOTE — ED TRIAGE NOTES
Pt to ED this am with complaints of nausea and vomiting, pt states he thinks it because he was recently switched to Percocet from [facility]. Pt has also recently started to decrease the amount of xanax that he takes and feels more anxious. Pt also concerned about a small \"sore\" under his nose.

## 2018-10-21 NOTE — FLOWSHEET NOTE
Discharge instructions reviewed with pt, understanding verbalized, no further needs or concerns at this time. Pt ambulated out of ED without difficulty. Pt verbalized understanding to follow up with his pcp and pain clinic tomorrow.

## 2018-10-25 ENCOUNTER — OFFICE VISIT (OUTPATIENT)
Dept: PRIMARY CARE CLINIC | Age: 68
End: 2018-10-25
Payer: MEDICARE

## 2018-10-25 VITALS
BODY MASS INDEX: 23.02 KG/M2 | DIASTOLIC BLOOD PRESSURE: 80 MMHG | HEART RATE: 105 BPM | WEIGHT: 147 LBS | SYSTOLIC BLOOD PRESSURE: 120 MMHG | OXYGEN SATURATION: 95 %

## 2018-10-25 DIAGNOSIS — M54.9 CHRONIC BACK PAIN, UNSPECIFIED BACK LOCATION, UNSPECIFIED BACK PAIN LATERALITY: ICD-10-CM

## 2018-10-25 DIAGNOSIS — G89.29 CHRONIC BACK PAIN, UNSPECIFIED BACK LOCATION, UNSPECIFIED BACK PAIN LATERALITY: ICD-10-CM

## 2018-10-25 DIAGNOSIS — F41.9 ANXIETY: ICD-10-CM

## 2018-10-25 DIAGNOSIS — Z79.899 POLYPHARMACY: ICD-10-CM

## 2018-10-25 DIAGNOSIS — R41.0 CONFUSION: Primary | ICD-10-CM

## 2018-10-25 LAB
APPEARANCE FLUID: CLEAR
BILIRUBIN, POC: ABNORMAL
BLOOD URINE, POC: ABNORMAL
CLARITY, POC: CLEAR
COLOR, POC: YELLOW
GLUCOSE URINE, POC: ABNORMAL
KETONES, POC: ABNORMAL
LEUKOCYTE EST, POC: ABNORMAL
NITRITE, POC: ABNORMAL
PH, POC: 5
PROTEIN, POC: ABNORMAL
SPECIFIC GRAVITY, POC: 1.01
UROBILINOGEN, POC: 0.2

## 2018-10-25 PROCEDURE — 99213 OFFICE O/P EST LOW 20 MIN: CPT | Performed by: NURSE PRACTITIONER

## 2018-10-25 PROCEDURE — 1123F ACP DISCUSS/DSCN MKR DOCD: CPT | Performed by: NURSE PRACTITIONER

## 2018-10-25 PROCEDURE — 1101F PT FALLS ASSESS-DOCD LE1/YR: CPT | Performed by: NURSE PRACTITIONER

## 2018-10-25 PROCEDURE — G8482 FLU IMMUNIZE ORDER/ADMIN: HCPCS | Performed by: NURSE PRACTITIONER

## 2018-10-25 PROCEDURE — 1036F TOBACCO NON-USER: CPT | Performed by: NURSE PRACTITIONER

## 2018-10-25 PROCEDURE — G8599 NO ASA/ANTIPLAT THER USE RNG: HCPCS | Performed by: NURSE PRACTITIONER

## 2018-10-25 PROCEDURE — 3017F COLORECTAL CA SCREEN DOC REV: CPT | Performed by: NURSE PRACTITIONER

## 2018-10-25 PROCEDURE — G8420 CALC BMI NORM PARAMETERS: HCPCS | Performed by: NURSE PRACTITIONER

## 2018-10-25 PROCEDURE — 4040F PNEUMOC VAC/ADMIN/RCVD: CPT | Performed by: NURSE PRACTITIONER

## 2018-10-25 PROCEDURE — 81002 URINALYSIS NONAUTO W/O SCOPE: CPT | Performed by: NURSE PRACTITIONER

## 2018-10-25 PROCEDURE — G8427 DOCREV CUR MEDS BY ELIG CLIN: HCPCS | Performed by: NURSE PRACTITIONER

## 2018-10-25 RX ORDER — HYDROCODONE BITARTRATE AND ACETAMINOPHEN 10; 325 MG/1; MG/1
TABLET ORAL
COMMUNITY

## 2018-10-25 RX ORDER — GABAPENTIN 800 MG/1
TABLET ORAL
COMMUNITY
End: 2018-11-19 | Stop reason: SINTOL

## 2018-10-25 ASSESSMENT — ENCOUNTER SYMPTOMS
ABDOMINAL PAIN: 0
EYE PAIN: 0
VOMITING: 0
NAUSEA: 0
COUGH: 0
SHORTNESS OF BREATH: 0
SORE THROAT: 0

## 2018-11-02 ENCOUNTER — TELEPHONE (OUTPATIENT)
Dept: PRIMARY CARE CLINIC | Age: 68
End: 2018-11-02

## 2018-11-02 RX ORDER — CHOLECALCIFEROL (VITAMIN D3) 125 MCG
5 CAPSULE ORAL DAILY
Qty: 30 TABLET | Refills: 3 | Status: SHIPPED | OUTPATIENT
Start: 2018-11-02 | End: 2018-11-19 | Stop reason: ALTCHOICE

## 2018-11-19 ENCOUNTER — OFFICE VISIT (OUTPATIENT)
Dept: PRIMARY CARE CLINIC | Age: 68
End: 2018-11-19
Payer: MEDICARE

## 2018-11-19 VITALS
HEART RATE: 91 BPM | WEIGHT: 142 LBS | OXYGEN SATURATION: 94 % | BODY MASS INDEX: 22.24 KG/M2 | SYSTOLIC BLOOD PRESSURE: 108 MMHG | DIASTOLIC BLOOD PRESSURE: 60 MMHG

## 2018-11-19 DIAGNOSIS — G47.00 INSOMNIA, UNSPECIFIED TYPE: ICD-10-CM

## 2018-11-19 DIAGNOSIS — Z79.899 POLYPHARMACY: ICD-10-CM

## 2018-11-19 DIAGNOSIS — G89.29 CHRONIC BACK PAIN, UNSPECIFIED BACK LOCATION, UNSPECIFIED BACK PAIN LATERALITY: Primary | ICD-10-CM

## 2018-11-19 DIAGNOSIS — F41.9 ANXIETY: ICD-10-CM

## 2018-11-19 DIAGNOSIS — R73.03 BORDERLINE TYPE 2 DIABETES MELLITUS: ICD-10-CM

## 2018-11-19 DIAGNOSIS — M54.9 CHRONIC BACK PAIN, UNSPECIFIED BACK LOCATION, UNSPECIFIED BACK PAIN LATERALITY: Primary | ICD-10-CM

## 2018-11-19 LAB — HBA1C MFR BLD: 5.9 %

## 2018-11-19 PROCEDURE — 4040F PNEUMOC VAC/ADMIN/RCVD: CPT | Performed by: NURSE PRACTITIONER

## 2018-11-19 PROCEDURE — 1123F ACP DISCUSS/DSCN MKR DOCD: CPT | Performed by: NURSE PRACTITIONER

## 2018-11-19 PROCEDURE — G8427 DOCREV CUR MEDS BY ELIG CLIN: HCPCS | Performed by: NURSE PRACTITIONER

## 2018-11-19 PROCEDURE — 83036 HEMOGLOBIN GLYCOSYLATED A1C: CPT | Performed by: NURSE PRACTITIONER

## 2018-11-19 PROCEDURE — G8420 CALC BMI NORM PARAMETERS: HCPCS | Performed by: NURSE PRACTITIONER

## 2018-11-19 PROCEDURE — 1101F PT FALLS ASSESS-DOCD LE1/YR: CPT | Performed by: NURSE PRACTITIONER

## 2018-11-19 PROCEDURE — 1036F TOBACCO NON-USER: CPT | Performed by: NURSE PRACTITIONER

## 2018-11-19 PROCEDURE — G8482 FLU IMMUNIZE ORDER/ADMIN: HCPCS | Performed by: NURSE PRACTITIONER

## 2018-11-19 PROCEDURE — 3017F COLORECTAL CA SCREEN DOC REV: CPT | Performed by: NURSE PRACTITIONER

## 2018-11-19 PROCEDURE — 99213 OFFICE O/P EST LOW 20 MIN: CPT | Performed by: NURSE PRACTITIONER

## 2018-11-19 PROCEDURE — G8599 NO ASA/ANTIPLAT THER USE RNG: HCPCS | Performed by: NURSE PRACTITIONER

## 2018-11-19 RX ORDER — TRAZODONE HYDROCHLORIDE 100 MG/1
200 TABLET ORAL NIGHTLY
Qty: 60 TABLET | Refills: 5 | Status: SHIPPED | OUTPATIENT
Start: 2018-11-19 | End: 2019-04-15 | Stop reason: SDUPTHER

## 2018-11-19 RX ORDER — CITALOPRAM 40 MG/1
40 TABLET ORAL DAILY
Qty: 30 TABLET | Refills: 5 | Status: SHIPPED | OUTPATIENT
Start: 2018-11-19 | End: 2019-05-14 | Stop reason: SDUPTHER

## 2018-11-19 RX ORDER — GABAPENTIN 300 MG/1
300 CAPSULE ORAL 2 TIMES DAILY
Qty: 60 CAPSULE | Refills: 2 | Status: SHIPPED | OUTPATIENT
Start: 2018-11-19 | End: 2019-06-12 | Stop reason: DRUGHIGH

## 2018-11-19 RX ORDER — ALPRAZOLAM 1 MG/1
1 TABLET ORAL 2 TIMES DAILY PRN
Qty: 60 TABLET | Refills: 0 | Status: SHIPPED | OUTPATIENT
Start: 2018-11-19 | End: 2018-12-19 | Stop reason: SDUPTHER

## 2018-11-19 ASSESSMENT — ENCOUNTER SYMPTOMS
ABDOMINAL PAIN: 0
SORE THROAT: 0
VOMITING: 0
NAUSEA: 0
COUGH: 0
EYE PAIN: 0
SHORTNESS OF BREATH: 0

## 2018-11-19 NOTE — PROGRESS NOTES
Have you seen any other physician or provider since your last visit? no    Have you had any other diagnostic tests since your last visit? yes - labs    Have you changed or stopped any medications since your last visit including any over-the-counter medicines, vitamins, or herbal medicines? no     Are you taking all your prescribed medications? Yes  If NO, why? -  N/A      REVIEW OF SYSTEMS:  Review of Systems   Constitutional: Negative for chills and fever. HENT: Negative for ear pain and sore throat. Eyes: Negative for pain and visual disturbance. Respiratory: Negative for cough and shortness of breath. Cardiovascular: Negative for chest pain, palpitations and leg swelling. Gastrointestinal: Negative for abdominal pain, nausea and vomiting. Genitourinary: Negative for dysuria and hematuria. Musculoskeletal: Positive for back pain. Negative for joint swelling. Skin: Negative for rash. Neurological: Negative for dizziness and weakness. Psychiatric/Behavioral: Positive for sleep disturbance. The patient is nervous/anxious.

## 2018-11-19 NOTE — PATIENT INSTRUCTIONS
Continue Xanax 1mg twice a day. Restart Trazodone 200mg at bedtime. Restart Gabapentin 300mg twice a day.

## 2018-12-03 ASSESSMENT — ENCOUNTER SYMPTOMS: BACK PAIN: 1

## 2018-12-19 ENCOUNTER — OFFICE VISIT (OUTPATIENT)
Dept: PRIMARY CARE CLINIC | Age: 68
End: 2018-12-19
Payer: MEDICARE

## 2018-12-19 ENCOUNTER — HOSPITAL ENCOUNTER (OUTPATIENT)
Facility: HOSPITAL | Age: 68
Discharge: HOME | End: 2018-12-19
Payer: MEDICARE

## 2018-12-19 VITALS
HEART RATE: 96 BPM | SYSTOLIC BLOOD PRESSURE: 122 MMHG | BODY MASS INDEX: 21.49 KG/M2 | OXYGEN SATURATION: 96 % | DIASTOLIC BLOOD PRESSURE: 64 MMHG | WEIGHT: 137.2 LBS

## 2018-12-19 DIAGNOSIS — E78.5 HYPERLIPIDEMIA, UNSPECIFIED HYPERLIPIDEMIA TYPE: ICD-10-CM

## 2018-12-19 DIAGNOSIS — F41.9 ANXIETY: ICD-10-CM

## 2018-12-19 DIAGNOSIS — R41.0 CONFUSION: ICD-10-CM

## 2018-12-19 DIAGNOSIS — M54.9 CHRONIC BACK PAIN, UNSPECIFIED BACK LOCATION, UNSPECIFIED BACK PAIN LATERALITY: ICD-10-CM

## 2018-12-19 DIAGNOSIS — R73.03 BORDERLINE TYPE 2 DIABETES MELLITUS: ICD-10-CM

## 2018-12-19 DIAGNOSIS — E78.5 HYPERLIPIDEMIA, UNSPECIFIED HYPERLIPIDEMIA TYPE: Primary | ICD-10-CM

## 2018-12-19 DIAGNOSIS — G89.29 CHRONIC BACK PAIN, UNSPECIFIED BACK LOCATION, UNSPECIFIED BACK PAIN LATERALITY: ICD-10-CM

## 2018-12-19 LAB
A/G RATIO: 2.1 (ref 0.8–2)
ALBUMIN SERPL-MCNC: 4.4 G/DL (ref 3.4–4.8)
ALP BLD-CCNC: 67 U/L (ref 25–100)
ALT SERPL-CCNC: 15 U/L (ref 4–36)
ANION GAP SERPL CALCULATED.3IONS-SCNC: 11 MMOL/L (ref 3–16)
AST SERPL-CCNC: 18 U/L (ref 8–33)
BILIRUB SERPL-MCNC: 0.4 MG/DL (ref 0.3–1.2)
BUN BLDV-MCNC: 8 MG/DL (ref 6–20)
CALCIUM SERPL-MCNC: 9.3 MG/DL (ref 8.5–10.5)
CHLORIDE BLD-SCNC: 98 MMOL/L (ref 98–107)
CHOLESTEROL, TOTAL: 151 MG/DL (ref 0–200)
CO2: 29 MMOL/L (ref 20–30)
CREAT SERPL-MCNC: 0.9 MG/DL (ref 0.4–1.2)
GFR AFRICAN AMERICAN: >59
GFR NON-AFRICAN AMERICAN: >59
GLOBULIN: 2.1 G/DL
GLUCOSE BLD-MCNC: 119 MG/DL (ref 74–106)
HDLC SERPL-MCNC: 45 MG/DL (ref 40–60)
LDL CHOLESTEROL CALCULATED: 87 MG/DL
POTASSIUM SERPL-SCNC: 4.6 MMOL/L (ref 3.4–5.1)
SODIUM BLD-SCNC: 138 MMOL/L (ref 136–145)
TOTAL PROTEIN: 6.5 G/DL (ref 6.4–8.3)
TRIGL SERPL-MCNC: 97 MG/DL (ref 0–249)
VLDLC SERPL CALC-MCNC: 19 MG/DL

## 2018-12-19 PROCEDURE — G8427 DOCREV CUR MEDS BY ELIG CLIN: HCPCS | Performed by: NURSE PRACTITIONER

## 2018-12-19 PROCEDURE — G8482 FLU IMMUNIZE ORDER/ADMIN: HCPCS | Performed by: NURSE PRACTITIONER

## 2018-12-19 PROCEDURE — 1123F ACP DISCUSS/DSCN MKR DOCD: CPT | Performed by: NURSE PRACTITIONER

## 2018-12-19 PROCEDURE — 1101F PT FALLS ASSESS-DOCD LE1/YR: CPT | Performed by: NURSE PRACTITIONER

## 2018-12-19 PROCEDURE — 80053 COMPREHEN METABOLIC PANEL: CPT

## 2018-12-19 PROCEDURE — 4040F PNEUMOC VAC/ADMIN/RCVD: CPT | Performed by: NURSE PRACTITIONER

## 2018-12-19 PROCEDURE — 3017F COLORECTAL CA SCREEN DOC REV: CPT | Performed by: NURSE PRACTITIONER

## 2018-12-19 PROCEDURE — G8599 NO ASA/ANTIPLAT THER USE RNG: HCPCS | Performed by: NURSE PRACTITIONER

## 2018-12-19 PROCEDURE — 1036F TOBACCO NON-USER: CPT | Performed by: NURSE PRACTITIONER

## 2018-12-19 PROCEDURE — G8420 CALC BMI NORM PARAMETERS: HCPCS | Performed by: NURSE PRACTITIONER

## 2018-12-19 PROCEDURE — 80061 LIPID PANEL: CPT

## 2018-12-19 PROCEDURE — 36415 COLL VENOUS BLD VENIPUNCTURE: CPT

## 2018-12-19 PROCEDURE — 99213 OFFICE O/P EST LOW 20 MIN: CPT | Performed by: NURSE PRACTITIONER

## 2018-12-19 RX ORDER — ALPRAZOLAM 1 MG/1
1 TABLET ORAL 2 TIMES DAILY PRN
Qty: 60 TABLET | Refills: 0 | Status: SHIPPED | OUTPATIENT
Start: 2018-12-19 | End: 2019-01-15 | Stop reason: SDUPTHER

## 2018-12-19 ASSESSMENT — ENCOUNTER SYMPTOMS
COUGH: 0
EYE PAIN: 0
NAUSEA: 0
ABDOMINAL PAIN: 0
SORE THROAT: 0
SHORTNESS OF BREATH: 0
VOMITING: 0

## 2018-12-20 ENCOUNTER — TELEPHONE (OUTPATIENT)
Dept: PRIMARY CARE CLINIC | Age: 68
End: 2018-12-20

## 2018-12-31 NOTE — PROGRESS NOTES
SUBJECTIVE:    Patient ID: Jolynn Baird is a 70 y.o. male. Medicalhistory Review  Past Medical, Family, and Social History reviewed and does contribute to the patient presenting condition    Health Maintenance Due   Topic Date Due    AAA screen  1950    Hepatitis C screen  1950    Diabetic foot exam  12/26/1959    Diabetic retinal exam  12/26/1959    DTaP/Tdap/Td vaccine (1 - Tdap) 12/26/1968    Shingles Vaccine (1 of 2 - 2 Dose Series) 12/26/1999    Low dose CT lung screening  12/26/2004    Pneumococcal low/med risk (2 of 2 - PCV13) 10/26/2016    Diabetic microalbuminuria test  11/17/2018       HPI:   Chief Complaint   Patient presents with    Anxiety     Patient here today for a follow  up with anxiety. He is needing some medication called in    His daughter recently passed away. He feels like he is less confused. He would like to increase his Xanax if possible. Patient's medications, allergies, pastmedical, surgical, social and family histories were reviewed and updated as appropriate. Review of Systems Reviewed and acurate. See MA note. OBJECTIVE:    /64 (Site: Right Upper Arm, Position: Sitting, Cuff Size: Medium Adult)   Pulse 96   Wt 137 lb 3.2 oz (62.2 kg)   SpO2 96%   BMI 21.49 kg/m²      Physical Exam   Constitutional: He is oriented to person, place, and time. He appears well-developed and well-nourished. No distress. HENT:   Head: Normocephalic. Right Ear: Tympanic membrane normal.   Left Ear: Tympanic membrane normal.   Mouth/Throat: No oropharyngeal exudate. Eyes: Lids are normal.   Neck: Neck supple. Cardiovascular: Normal rate, regular rhythm and normal heart sounds. Pulmonary/Chest: Effort normal and breath sounds normal.   Abdominal: Soft. Bowel sounds are normal. He exhibits no distension. There is no tenderness. Musculoskeletal: He exhibits no edema. Lymphadenopathy:     He has no cervical adenopathy.    Neurological: He

## 2019-01-10 ENCOUNTER — OFFICE VISIT (OUTPATIENT)
Dept: CARDIOLOGY | Facility: CLINIC | Age: 69
End: 2019-01-10

## 2019-01-10 ENCOUNTER — HOSPITAL ENCOUNTER (OUTPATIENT)
Facility: HOSPITAL | Age: 69
Discharge: HOME | End: 2019-01-10
Payer: MEDICARE

## 2019-01-10 VITALS
HEART RATE: 84 BPM | OXYGEN SATURATION: 98 % | BODY MASS INDEX: 20.4 KG/M2 | HEIGHT: 67 IN | WEIGHT: 130 LBS | DIASTOLIC BLOOD PRESSURE: 70 MMHG | SYSTOLIC BLOOD PRESSURE: 130 MMHG

## 2019-01-10 DIAGNOSIS — I25.10 CORONARY ARTERY DISEASE INVOLVING NATIVE CORONARY ARTERY OF NATIVE HEART WITHOUT ANGINA PECTORIS: Primary | ICD-10-CM

## 2019-01-10 DIAGNOSIS — E78.00 HYPERCHOLESTEROLEMIA: ICD-10-CM

## 2019-01-10 DIAGNOSIS — I20.0 UNSTABLE ANGINA (HCC): ICD-10-CM

## 2019-01-10 DIAGNOSIS — R00.2 PALPITATIONS: ICD-10-CM

## 2019-01-10 DIAGNOSIS — I65.23 BILATERAL CAROTID ARTERY STENOSIS: ICD-10-CM

## 2019-01-10 PROCEDURE — 93226 XTRNL ECG REC<48 HR SCAN A/R: CPT

## 2019-01-10 PROCEDURE — 93225 XTRNL ECG REC<48 HRS REC: CPT

## 2019-01-10 PROCEDURE — 99214 OFFICE O/P EST MOD 30 MIN: CPT | Performed by: INTERNAL MEDICINE

## 2019-01-10 RX ORDER — ALPRAZOLAM 1 MG/1
1 TABLET ORAL 2 TIMES DAILY PRN
COMMUNITY

## 2019-01-10 RX ORDER — TRAZODONE HYDROCHLORIDE 100 MG/1
100 TABLET ORAL NIGHTLY
COMMUNITY

## 2019-01-10 RX ORDER — AMLODIPINE BESYLATE 2.5 MG/1
2.5 TABLET ORAL DAILY
Qty: 90 TABLET | Refills: 3 | Status: SHIPPED | OUTPATIENT
Start: 2019-01-10 | End: 2019-02-19

## 2019-01-10 RX ORDER — GABAPENTIN 300 MG/1
300 CAPSULE ORAL 2 TIMES DAILY
COMMUNITY
End: 2019-04-25

## 2019-01-10 RX ORDER — ASPIRIN 325 MG
325 TABLET, DELAYED RELEASE (ENTERIC COATED) ORAL DAILY
COMMUNITY
End: 2021-09-23

## 2019-01-10 RX ORDER — PRAVASTATIN SODIUM 80 MG/1
80 TABLET ORAL DAILY
COMMUNITY
End: 2019-02-19

## 2019-01-10 NOTE — H&P (VIEW-ONLY)
[facility]  Office visit  Geronimo Pizano  1950  [phone]    VISIT DATE:  03/20/2018    PCP: Dee Galindo MD  [address] [place] [address]    CC:  Chief Complaint   Patient presents with   • Coronary Artery Disease   • Chest Pain   • Shortness of Breath       PROBLEM LIST:  1. Coronary artery disease:  a. Remote bare-metal stent, April 1999.  b. Recurrent stenting of left anterior descending coronary artery in 2003.  c. Cardiac catheterization in August 2007, revealing normal EF with 25% in-stent stenosis in the left anterior descending coronary artery and 50% stenosis in the first obtuse marginal branch of the circumflex.  d. Left heart catheterization, 06/16/2009: 25% in-stent restenosis of the LAD, normal EF.  e. Normal nuclear perfusion study, 03/19/2015, Nora Guzmán MD, revealing no evidence of reducible ischemia, normal LV systolic function and wall motion.   f. Echo 10/2018       -Ejection fraction is visually estimated to be 60-65 %.        -Mild concentric left ventricular hypertrophy is present.        -Diastolic filling parameters suggests grade I diastolic dysfunction .        -Mild-to-moderate aortic regurgitation is present.  2. Aortic insufficiency, mild-moderate  3. Carotid artery disease:  a. Asymptomatic.  b. Carotid duplex, 08/11/2012: 40% to 60% stenosis of the left ICA, less than 40% of the right ICA.  c. Carotid ultrasound, 10/04/2013: No change from previous exams, with 30% stenosis on the right carotid artery and 50% on the left.  d. March 2018:1. Right internal carotid artery estimated diameter reduction:  20-49 %.  2. Left internal carotid artery estimated diameter reduction:  20-49 %. However, the ICA:ECA ratio is elevated suggesting that  this may represent a 50-69% stenosis similar to that seen on the  study of 12/27/2016.  4. Hypercholesterolemia.  5. Chronic back pain.  6. Recent smoking cessation.  7. Mild  "anxiety.  8. Acid reflux.  9. Chronic obstructive pulmonary disease.  10. Surgical history: Remote appendectomy.    ASSESSMENT:   Diagnosis Plan   1. Coronary artery disease involving native coronary artery of native heart without angina pectoris     2. Hypercholesterolemia     3. Bilateral carotid artery stenosis         PLAN:  Coronary artery disease: Accelerating angina in a class III/VI pattern. Recommending [facility] for definitive assessment of coronary anatomy. Continue aspirin and statin.  titrating antianginals, adding amlodipine 2.5mg po daily.  Previously intolerant to high intensity statin therapy.    Hyper cholesterolemia: Goal LDL less than 70.  Continue statin.    Peripheral vascular disease: Mild bilateral carotid disease. Continue current medical therapy with carotid ultrasound imaging every 2-3 years.    Subjective  Reporting that episodes of chest discomfort are increasing in frequency, severity and duration.  Describes sudden onset dull substernal chest pressure with radiation to both shoulders.  Moderate in intensity, lasting up to 5 mins.  Can occur at rest or with exertion. Similar to previous angina. Did not tolerate previous pharmcologic stress test well, very prominent side effects. Blood pressures running less than 140/90 mmHg.  He is compliant with medical therapy.  Denies easy bruising or bleeding complications.  No myalgias on statin therapy.     PHYSICAL EXAMINATION:  Vitals:    01/10/19 1136   BP: 130/70   BP Location: Right arm   Patient Position: Sitting   Pulse: 84   SpO2: 98%   Weight: 59 kg (130 lb)   Height: 170.2 cm (67\")     General Appearance:    Alert, cooperative, no distress, appears stated age   Head:    Normocephalic, without obvious abnormality, atraumatic   Eyes:    conjunctiva/corneas clear   Nose:   Nares normal, septum midline, mucosa normal, no drainage   Throat:   Lips, teeth and gums normal   Neck:   Supple, symmetrical, trachea midline, no carotid    bruit or JVD "   Lungs:     Clear to auscultation bilaterally, respirations unlabored   Chest Wall:    No tenderness or deformity    Heart:    Regular rate and rhythm, S1 and S2 normal, no murmur, rub   or gallop, normal carotid impulse bilaterally without bruit.   Abdomen:     Soft, non-tender   Extremities:   Extremities normal, atraumatic, no cyanosis or edema   Pulses:   2+ and symmetric all extremities   Skin:   Skin color, texture, turgor normal, no rashes or lesions       Diagnostic Data:  Procedures  No results found for: CHLPL, TRIG, HDL, LDLDIRECT  No results found for: GLUCOSE, BUN, CREATININE, NA, K, CL, CO2, CREATININE, BUN  No results found for: HGBA1C  No results found for: WBC, HGB, HCT, PLT    Allergies  Allergies   Allergen Reactions   • Metoprolol Shortness Of Breath   • Cetirizine      Insomnia   • Fluoxetine      Dizziness and stomach pain   • Promethazine      itching   • Rosuvastatin Other (See Comments)     Myalgias   • Sulfa Antibiotics      Oral blisters, diarrhea   • Varenicline Swelling     Throat swelling   • Penicillins Rash       Current Medications    Current Outpatient Medications:   •  albuterol (PROVENTIL HFA;VENTOLIN HFA) 108 (90 Base) MCG/ACT inhaler, Inhale 2 puffs Every 4 (Four) Hours As Needed for Wheezing., Disp: , Rfl:   •  ALPRAZolam (XANAX) 1 MG tablet, Take 1 mg by mouth 2 (Two) Times a Day As Needed for Anxiety., Disp: , Rfl:   •  aspirin 81 MG EC tablet, Take 81 mg by mouth Daily., Disp: , Rfl:   •  citalopram (CeleXA) 40 MG tablet, Take 40 mg by mouth Daily., Disp: , Rfl: 5  •  cyclobenzaprine (FLEXERIL) 10 MG tablet, Take 10 mg by mouth Daily., Disp: , Rfl:   •  fluticasone (FLONASE) 50 MCG/ACT nasal spray, 2 sprays into the nostril(s) as directed by provider As Needed., Disp: , Rfl:   •  gabapentin (NEURONTIN) 300 MG capsule, Take 300 mg by mouth 2 (Two) Times a Day., Disp: , Rfl:   •  HYDROcodone-acetaminophen (NORCO)  MG per tablet, Take 1 tablet by mouth Every 6 (Six)  Hours As Needed for Moderate Pain (4-6)., Disp: , Rfl:   •  nitroglycerin (NITROSTAT) 0.4 MG SL tablet, 1 under the tongue as needed for angina, may repeat q5mins for up three doses, Disp: 100 tablet, Rfl: 11  •  pravastatin (PRAVACHOL) 80 MG tablet, Take 80 mg by mouth Daily., Disp: , Rfl:   •  tamsulosin (FLOMAX) 0.4 MG capsule 24 hr capsule, Take 1 capsule by mouth As Needed., Disp: , Rfl:   •  traZODone (DESYREL) 100 MG tablet, Take 100 mg by mouth Every Night. 2 tabs at night, Disp: , Rfl:           ROS  Review of Systems   Cardiovascular: Positive for chest pain and irregular heartbeat.   Respiratory: Positive for cough, shortness of breath, sleep disturbances due to breathing, snoring and wheezing.          SOCIAL HX  Social History     Socioeconomic History   • Marital status:      Spouse name: Not on file   • Number of children: Not on file   • Years of education: Not on file   • Highest education level: Not on file   Social Needs   • Financial resource strain: Not on file   • Food insecurity - worry: Not on file   • Food insecurity - inability: Not on file   • Transportation needs - medical: Not on file   • Transportation needs - non-medical: Not on file   Occupational History   • Not on file   Tobacco Use   • Smoking status: Former Smoker     Types: Cigarettes, Cigars     Last attempt to quit: 2018     Years since quittin.0   • Smokeless tobacco: Never Used   • Tobacco comment: started back smoking in the summer , smokes 4 cigars daily   Substance and Sexual Activity   • Alcohol use: No   • Drug use: No   • Sexual activity: Defer   Other Topics Concern   • Not on file   Social History Narrative   • Not on file       FAMILY HX  Family History   Problem Relation Age of Onset   • Diabetes Father    • Heart disease Father    • Stroke Brother    • Coronary artery disease Other    • Diabetes Other    • Diabetes Mother    • Leukemia Sister    • Heart failure Sister    • Leukemia Daughter               Derek Jackson III, MD, FACC

## 2019-01-10 NOTE — PROGRESS NOTES
[facility]  Office visit  Geronimo Pizano  1950  [phone]    VISIT DATE:  03/20/2018    PCP: Dee Galindo MD  [address] [place] [address]    CC:  Chief Complaint   Patient presents with   • Coronary Artery Disease   • Chest Pain   • Shortness of Breath       PROBLEM LIST:  1. Coronary artery disease:  a. Remote bare-metal stent, April 1999.  b. Recurrent stenting of left anterior descending coronary artery in 2003.  c. Cardiac catheterization in August 2007, revealing normal EF with 25% in-stent stenosis in the left anterior descending coronary artery and 50% stenosis in the first obtuse marginal branch of the circumflex.  d. Left heart catheterization, 06/16/2009: 25% in-stent restenosis of the LAD, normal EF.  e. Normal nuclear perfusion study, 03/19/2015, Nora Guzmán MD, revealing no evidence of reducible ischemia, normal LV systolic function and wall motion.   f. Echo 10/2018       -Ejection fraction is visually estimated to be 60-65 %.        -Mild concentric left ventricular hypertrophy is present.        -Diastolic filling parameters suggests grade I diastolic dysfunction .        -Mild-to-moderate aortic regurgitation is present.  2. Aortic insufficiency, mild-moderate  3. Carotid artery disease:  a. Asymptomatic.  b. Carotid duplex, 08/11/2012: 40% to 60% stenosis of the left ICA, less than 40% of the right ICA.  c. Carotid ultrasound, 10/04/2013: No change from previous exams, with 30% stenosis on the right carotid artery and 50% on the left.  d. March 2018:1. Right internal carotid artery estimated diameter reduction:  20-49 %.  2. Left internal carotid artery estimated diameter reduction:  20-49 %. However, the ICA:ECA ratio is elevated suggesting that  this may represent a 50-69% stenosis similar to that seen on the  study of 12/27/2016.  4. Hypercholesterolemia.  5. Chronic back pain.  6. Recent smoking cessation.  7. Mild  "anxiety.  8. Acid reflux.  9. Chronic obstructive pulmonary disease.  10. Surgical history: Remote appendectomy.    ASSESSMENT:   Diagnosis Plan   1. Coronary artery disease involving native coronary artery of native heart without angina pectoris     2. Hypercholesterolemia     3. Bilateral carotid artery stenosis         PLAN:  Coronary artery disease: Accelerating angina in a class III/VI pattern. Recommending [facility] for definitive assessment of coronary anatomy. Continue aspirin and statin.  titrating antianginals, adding amlodipine 2.5mg po daily.  Previously intolerant to high intensity statin therapy.    Hyper cholesterolemia: Goal LDL less than 70.  Continue statin.    Peripheral vascular disease: Mild bilateral carotid disease. Continue current medical therapy with carotid ultrasound imaging every 2-3 years.    Subjective  Reporting that episodes of chest discomfort are increasing in frequency, severity and duration.  Describes sudden onset dull substernal chest pressure with radiation to both shoulders.  Moderate in intensity, lasting up to 5 mins.  Can occur at rest or with exertion. Similar to previous angina. Did not tolerate previous pharmcologic stress test well, very prominent side effects. Blood pressures running less than 140/90 mmHg.  He is compliant with medical therapy.  Denies easy bruising or bleeding complications.  No myalgias on statin therapy.     PHYSICAL EXAMINATION:  Vitals:    01/10/19 1136   BP: 130/70   BP Location: Right arm   Patient Position: Sitting   Pulse: 84   SpO2: 98%   Weight: 59 kg (130 lb)   Height: 170.2 cm (67\")     General Appearance:    Alert, cooperative, no distress, appears stated age   Head:    Normocephalic, without obvious abnormality, atraumatic   Eyes:    conjunctiva/corneas clear   Nose:   Nares normal, septum midline, mucosa normal, no drainage   Throat:   Lips, teeth and gums normal   Neck:   Supple, symmetrical, trachea midline, no carotid    bruit or JVD "   Lungs:     Clear to auscultation bilaterally, respirations unlabored   Chest Wall:    No tenderness or deformity    Heart:    Regular rate and rhythm, S1 and S2 normal, no murmur, rub   or gallop, normal carotid impulse bilaterally without bruit.   Abdomen:     Soft, non-tender   Extremities:   Extremities normal, atraumatic, no cyanosis or edema   Pulses:   2+ and symmetric all extremities   Skin:   Skin color, texture, turgor normal, no rashes or lesions       Diagnostic Data:  Procedures  No results found for: CHLPL, TRIG, HDL, LDLDIRECT  No results found for: GLUCOSE, BUN, CREATININE, NA, K, CL, CO2, CREATININE, BUN  No results found for: HGBA1C  No results found for: WBC, HGB, HCT, PLT    Allergies  Allergies   Allergen Reactions   • Metoprolol Shortness Of Breath   • Cetirizine      Insomnia   • Fluoxetine      Dizziness and stomach pain   • Promethazine      itching   • Rosuvastatin Other (See Comments)     Myalgias   • Sulfa Antibiotics      Oral blisters, diarrhea   • Varenicline Swelling     Throat swelling   • Penicillins Rash       Current Medications    Current Outpatient Medications:   •  albuterol (PROVENTIL HFA;VENTOLIN HFA) 108 (90 Base) MCG/ACT inhaler, Inhale 2 puffs Every 4 (Four) Hours As Needed for Wheezing., Disp: , Rfl:   •  ALPRAZolam (XANAX) 1 MG tablet, Take 1 mg by mouth 2 (Two) Times a Day As Needed for Anxiety., Disp: , Rfl:   •  aspirin 81 MG EC tablet, Take 81 mg by mouth Daily., Disp: , Rfl:   •  citalopram (CeleXA) 40 MG tablet, Take 40 mg by mouth Daily., Disp: , Rfl: 5  •  cyclobenzaprine (FLEXERIL) 10 MG tablet, Take 10 mg by mouth Daily., Disp: , Rfl:   •  fluticasone (FLONASE) 50 MCG/ACT nasal spray, 2 sprays into the nostril(s) as directed by provider As Needed., Disp: , Rfl:   •  gabapentin (NEURONTIN) 300 MG capsule, Take 300 mg by mouth 2 (Two) Times a Day., Disp: , Rfl:   •  HYDROcodone-acetaminophen (NORCO)  MG per tablet, Take 1 tablet by mouth Every 6 (Six)  Hours As Needed for Moderate Pain (4-6)., Disp: , Rfl:   •  nitroglycerin (NITROSTAT) 0.4 MG SL tablet, 1 under the tongue as needed for angina, may repeat q5mins for up three doses, Disp: 100 tablet, Rfl: 11  •  pravastatin (PRAVACHOL) 80 MG tablet, Take 80 mg by mouth Daily., Disp: , Rfl:   •  tamsulosin (FLOMAX) 0.4 MG capsule 24 hr capsule, Take 1 capsule by mouth As Needed., Disp: , Rfl:   •  traZODone (DESYREL) 100 MG tablet, Take 100 mg by mouth Every Night. 2 tabs at night, Disp: , Rfl:           ROS  Review of Systems   Cardiovascular: Positive for chest pain and irregular heartbeat.   Respiratory: Positive for cough, shortness of breath, sleep disturbances due to breathing, snoring and wheezing.          SOCIAL HX  Social History     Socioeconomic History   • Marital status:      Spouse name: Not on file   • Number of children: Not on file   • Years of education: Not on file   • Highest education level: Not on file   Social Needs   • Financial resource strain: Not on file   • Food insecurity - worry: Not on file   • Food insecurity - inability: Not on file   • Transportation needs - medical: Not on file   • Transportation needs - non-medical: Not on file   Occupational History   • Not on file   Tobacco Use   • Smoking status: Former Smoker     Types: Cigarettes, Cigars     Last attempt to quit: 2018     Years since quittin.0   • Smokeless tobacco: Never Used   • Tobacco comment: started back smoking in the summer , smokes 4 cigars daily   Substance and Sexual Activity   • Alcohol use: No   • Drug use: No   • Sexual activity: Defer   Other Topics Concern   • Not on file   Social History Narrative   • Not on file       FAMILY HX  Family History   Problem Relation Age of Onset   • Diabetes Father    • Heart disease Father    • Stroke Brother    • Coronary artery disease Other    • Diabetes Other    • Diabetes Mother    • Leukemia Sister    • Heart failure Sister    • Leukemia Daughter               Derek Jackson III, MD, FACC

## 2019-01-11 PROBLEM — I20.0 UNSTABLE ANGINA (HCC): Status: ACTIVE | Noted: 2019-01-11

## 2019-01-14 ENCOUNTER — PREP FOR SURGERY (OUTPATIENT)
Dept: OTHER | Facility: HOSPITAL | Age: 69
End: 2019-01-14

## 2019-01-14 DIAGNOSIS — I20.9 ANGINA PECTORIS (HCC): Primary | ICD-10-CM

## 2019-01-14 RX ORDER — ONDANSETRON 2 MG/ML
4 INJECTION INTRAMUSCULAR; INTRAVENOUS EVERY 6 HOURS PRN
Status: CANCELLED | OUTPATIENT
Start: 2019-01-14

## 2019-01-14 RX ORDER — ASPIRIN 325 MG
325 TABLET ORAL ONCE
Status: CANCELLED | OUTPATIENT
Start: 2019-01-14 | End: 2019-01-14

## 2019-01-14 RX ORDER — SODIUM CHLORIDE 0.9 % (FLUSH) 0.9 %
3-10 SYRINGE (ML) INJECTION AS NEEDED
Status: CANCELLED | OUTPATIENT
Start: 2019-01-14

## 2019-01-14 RX ORDER — SODIUM CHLORIDE 0.9 % (FLUSH) 0.9 %
3 SYRINGE (ML) INJECTION EVERY 12 HOURS SCHEDULED
Status: CANCELLED | OUTPATIENT
Start: 2019-01-14

## 2019-01-14 RX ORDER — ASPIRIN 325 MG
325 TABLET, DELAYED RELEASE (ENTERIC COATED) ORAL DAILY
Status: CANCELLED | OUTPATIENT
Start: 2019-01-15

## 2019-01-14 RX ORDER — NITROGLYCERIN 0.4 MG/1
0.4 TABLET SUBLINGUAL
Status: CANCELLED | OUTPATIENT
Start: 2019-01-14

## 2019-01-14 RX ORDER — ACETAMINOPHEN 325 MG/1
650 TABLET ORAL EVERY 4 HOURS PRN
Status: CANCELLED | OUTPATIENT
Start: 2019-01-14

## 2019-01-15 DIAGNOSIS — F41.9 ANXIETY: ICD-10-CM

## 2019-01-15 RX ORDER — ALPRAZOLAM 1 MG/1
TABLET ORAL
Qty: 60 TABLET | Refills: 0 | Status: SHIPPED | OUTPATIENT
Start: 2019-01-15 | End: 2019-02-07 | Stop reason: SDUPTHER

## 2019-01-16 DIAGNOSIS — F41.9 ANXIETY: ICD-10-CM

## 2019-01-16 RX ORDER — PRAVASTATIN SODIUM 40 MG
TABLET ORAL
Qty: 45 TABLET | Refills: 5 | Status: SHIPPED | OUTPATIENT
Start: 2019-01-16 | End: 2020-02-04

## 2019-01-18 ENCOUNTER — HOSPITAL ENCOUNTER (OUTPATIENT)
Facility: HOSPITAL | Age: 69
Discharge: HOME | End: 2019-01-18
Attending: INTERNAL MEDICINE | Admitting: INTERNAL MEDICINE

## 2019-01-18 VITALS
DIASTOLIC BLOOD PRESSURE: 76 MMHG | RESPIRATION RATE: 16 BRPM | WEIGHT: 137.9 LBS | HEIGHT: 67 IN | SYSTOLIC BLOOD PRESSURE: 123 MMHG | BODY MASS INDEX: 21.64 KG/M2 | TEMPERATURE: 98.2 F | OXYGEN SATURATION: 96 % | HEART RATE: 92 BPM

## 2019-01-18 DIAGNOSIS — I20.0 UNSTABLE ANGINA (HCC): ICD-10-CM

## 2019-01-18 LAB
ANION GAP SERPL CALCULATED.3IONS-SCNC: 1 MMOL/L (ref 3–11)
ARTICHOKE IGE QN: 87 MG/DL (ref 0–130)
BUN BLD-MCNC: 7 MG/DL (ref 9–23)
BUN/CREAT SERPL: 8.6 (ref 7–25)
CALCIUM SPEC-SCNC: 9 MG/DL (ref 8.7–10.4)
CHLORIDE SERPL-SCNC: 104 MMOL/L (ref 99–109)
CHOLEST SERPL-MCNC: 140 MG/DL (ref 0–200)
CO2 SERPL-SCNC: 29 MMOL/L (ref 20–31)
CREAT BLD-MCNC: 0.81 MG/DL (ref 0.6–1.3)
DEPRECATED RDW RBC AUTO: 53.8 FL (ref 37–54)
ERYTHROCYTE [DISTWIDTH] IN BLOOD BY AUTOMATED COUNT: 15.1 % (ref 11.3–14.5)
GFR SERPL CREATININE-BSD FRML MDRD: 94 ML/MIN/1.73
GLUCOSE BLD-MCNC: 127 MG/DL (ref 70–100)
HBA1C MFR BLD: 6.2 % (ref 4.8–5.6)
HCT VFR BLD AUTO: 49.2 % (ref 38.9–50.9)
HDLC SERPL-MCNC: 43 MG/DL (ref 40–60)
HGB BLD-MCNC: 17.1 G/DL (ref 13.1–17.5)
MCH RBC QN AUTO: 33.8 PG (ref 27–31)
MCHC RBC AUTO-ENTMCNC: 34.8 G/DL (ref 32–36)
MCV RBC AUTO: 97.2 FL (ref 80–99)
PLATELET # BLD AUTO: 171 10*3/MM3 (ref 150–450)
PMV BLD AUTO: 10.4 FL (ref 6–12)
POTASSIUM BLD-SCNC: 4.3 MMOL/L (ref 3.5–5.5)
RBC # BLD AUTO: 5.06 10*6/MM3 (ref 4.2–5.76)
SODIUM BLD-SCNC: 134 MMOL/L (ref 132–146)
TRIGL SERPL-MCNC: 126 MG/DL (ref 0–150)
WBC NRBC COR # BLD: 6.67 10*3/MM3 (ref 3.5–10.8)

## 2019-01-18 PROCEDURE — C1874 STENT, COATED/COV W/DEL SYS: HCPCS | Performed by: INTERNAL MEDICINE

## 2019-01-18 PROCEDURE — 85027 COMPLETE CBC AUTOMATED: CPT | Performed by: INTERNAL MEDICINE

## 2019-01-18 PROCEDURE — 80048 BASIC METABOLIC PNL TOTAL CA: CPT | Performed by: INTERNAL MEDICINE

## 2019-01-18 PROCEDURE — C1769 GUIDE WIRE: HCPCS | Performed by: INTERNAL MEDICINE

## 2019-01-18 PROCEDURE — 83036 HEMOGLOBIN GLYCOSYLATED A1C: CPT | Performed by: PHYSICIAN ASSISTANT

## 2019-01-18 PROCEDURE — 93458 L HRT ARTERY/VENTRICLE ANGIO: CPT | Performed by: INTERNAL MEDICINE

## 2019-01-18 PROCEDURE — C1887 CATHETER, GUIDING: HCPCS | Performed by: INTERNAL MEDICINE

## 2019-01-18 PROCEDURE — 63710000001 CLOPIDOGREL 75 MG TABLET: Performed by: INTERNAL MEDICINE

## 2019-01-18 PROCEDURE — A9270 NON-COVERED ITEM OR SERVICE: HCPCS | Performed by: PHYSICIAN ASSISTANT

## 2019-01-18 PROCEDURE — C1725 CATH, TRANSLUMIN NON-LASER: HCPCS | Performed by: INTERNAL MEDICINE

## 2019-01-18 PROCEDURE — C9600 PERC DRUG-EL COR STENT SING: HCPCS | Performed by: INTERNAL MEDICINE

## 2019-01-18 PROCEDURE — 25010000002 MIDAZOLAM PER 1 MG: Performed by: INTERNAL MEDICINE

## 2019-01-18 PROCEDURE — 25010000002 HEPARIN (PORCINE) PER 1000 UNITS: Performed by: INTERNAL MEDICINE

## 2019-01-18 PROCEDURE — 0 IOPAMIDOL PER 1 ML: Performed by: INTERNAL MEDICINE

## 2019-01-18 PROCEDURE — 93005 ELECTROCARDIOGRAM TRACING: CPT | Performed by: INTERNAL MEDICINE

## 2019-01-18 PROCEDURE — 25010000002 FENTANYL CITRATE (PF) 100 MCG/2ML SOLUTION: Performed by: INTERNAL MEDICINE

## 2019-01-18 PROCEDURE — 85347 COAGULATION TIME ACTIVATED: CPT

## 2019-01-18 PROCEDURE — C1894 INTRO/SHEATH, NON-LASER: HCPCS | Performed by: INTERNAL MEDICINE

## 2019-01-18 PROCEDURE — 63710000001 ASPIRIN 325 MG TABLET: Performed by: PHYSICIAN ASSISTANT

## 2019-01-18 PROCEDURE — 92928 PRQ TCAT PLMT NTRAC ST 1 LES: CPT | Performed by: INTERNAL MEDICINE

## 2019-01-18 PROCEDURE — 36415 COLL VENOUS BLD VENIPUNCTURE: CPT

## 2019-01-18 PROCEDURE — 80061 LIPID PANEL: CPT | Performed by: PHYSICIAN ASSISTANT

## 2019-01-18 PROCEDURE — A9270 NON-COVERED ITEM OR SERVICE: HCPCS | Performed by: INTERNAL MEDICINE

## 2019-01-18 DEVICE — XIENCE SIERRA™ EVEROLIMUS ELUTING CORONARY STENT SYSTEM 3.00 MM X 12 MM / RAPID-EXCHANGE
Type: IMPLANTABLE DEVICE | Status: FUNCTIONAL
Brand: XIENCE SIERRA™

## 2019-01-18 RX ORDER — ALPRAZOLAM 0.5 MG/1
1 TABLET ORAL 2 TIMES DAILY PRN
Status: DISCONTINUED | OUTPATIENT
Start: 2019-01-18 | End: 2019-01-18 | Stop reason: HOSPADM

## 2019-01-18 RX ORDER — ZOLPIDEM TARTRATE 5 MG/1
5 TABLET ORAL NIGHTLY PRN
Status: DISCONTINUED | OUTPATIENT
Start: 2019-01-18 | End: 2019-01-18 | Stop reason: HOSPADM

## 2019-01-18 RX ORDER — ALBUTEROL SULFATE 2.5 MG/3ML
2.5 SOLUTION RESPIRATORY (INHALATION) EVERY 6 HOURS PRN
Status: DISCONTINUED | OUTPATIENT
Start: 2019-01-18 | End: 2019-01-18 | Stop reason: HOSPADM

## 2019-01-18 RX ORDER — HEPARIN SODIUM 1000 [USP'U]/ML
INJECTION, SOLUTION INTRAVENOUS; SUBCUTANEOUS AS NEEDED
Status: DISCONTINUED | OUTPATIENT
Start: 2019-01-18 | End: 2019-01-18 | Stop reason: HOSPADM

## 2019-01-18 RX ORDER — FENTANYL CITRATE 50 UG/ML
INJECTION, SOLUTION INTRAMUSCULAR; INTRAVENOUS AS NEEDED
Status: DISCONTINUED | OUTPATIENT
Start: 2019-01-18 | End: 2019-01-18 | Stop reason: HOSPADM

## 2019-01-18 RX ORDER — TAMSULOSIN HYDROCHLORIDE 0.4 MG/1
0.4 CAPSULE ORAL DAILY
Status: DISCONTINUED | OUTPATIENT
Start: 2019-01-18 | End: 2019-01-18 | Stop reason: HOSPADM

## 2019-01-18 RX ORDER — MORPHINE SULFATE 2 MG/ML
1 INJECTION, SOLUTION INTRAMUSCULAR; INTRAVENOUS EVERY 4 HOURS PRN
Status: DISCONTINUED | OUTPATIENT
Start: 2019-01-18 | End: 2019-01-18 | Stop reason: HOSPADM

## 2019-01-18 RX ORDER — HYDROCODONE BITARTRATE AND ACETAMINOPHEN 7.5; 325 MG/1; MG/1
1 TABLET ORAL EVERY 4 HOURS PRN
Status: DISCONTINUED | OUTPATIENT
Start: 2019-01-18 | End: 2019-01-18 | Stop reason: HOSPADM

## 2019-01-18 RX ORDER — MIDAZOLAM HYDROCHLORIDE 1 MG/ML
INJECTION INTRAMUSCULAR; INTRAVENOUS AS NEEDED
Status: DISCONTINUED | OUTPATIENT
Start: 2019-01-18 | End: 2019-01-18 | Stop reason: HOSPADM

## 2019-01-18 RX ORDER — SODIUM CHLORIDE 0.9 % (FLUSH) 0.9 %
3 SYRINGE (ML) INJECTION EVERY 12 HOURS SCHEDULED
Status: DISCONTINUED | OUTPATIENT
Start: 2019-01-18 | End: 2019-01-18 | Stop reason: HOSPADM

## 2019-01-18 RX ORDER — ASPIRIN 325 MG
325 TABLET ORAL ONCE
Status: COMPLETED | OUTPATIENT
Start: 2019-01-18 | End: 2019-01-18

## 2019-01-18 RX ORDER — ACETAMINOPHEN 325 MG/1
650 TABLET ORAL EVERY 4 HOURS PRN
Status: DISCONTINUED | OUTPATIENT
Start: 2019-01-18 | End: 2019-01-18 | Stop reason: HOSPADM

## 2019-01-18 RX ORDER — CLOPIDOGREL BISULFATE 75 MG/1
TABLET ORAL AS NEEDED
Status: DISCONTINUED | OUTPATIENT
Start: 2019-01-18 | End: 2019-01-18 | Stop reason: HOSPADM

## 2019-01-18 RX ORDER — ASPIRIN 81 MG/1
81 TABLET ORAL DAILY
Status: DISCONTINUED | OUTPATIENT
Start: 2019-01-18 | End: 2019-01-18 | Stop reason: HOSPADM

## 2019-01-18 RX ORDER — SODIUM CHLORIDE 9 MG/ML
100 INJECTION, SOLUTION INTRAVENOUS CONTINUOUS
Status: DISCONTINUED | OUTPATIENT
Start: 2019-01-18 | End: 2019-01-18 | Stop reason: HOSPADM

## 2019-01-18 RX ORDER — ATORVASTATIN CALCIUM 10 MG/1
10 TABLET, FILM COATED ORAL DAILY
Status: DISCONTINUED | OUTPATIENT
Start: 2019-01-18 | End: 2019-01-18 | Stop reason: HOSPADM

## 2019-01-18 RX ORDER — NALOXONE HCL 0.4 MG/ML
0.4 VIAL (ML) INJECTION
Status: DISCONTINUED | OUTPATIENT
Start: 2019-01-18 | End: 2019-01-18 | Stop reason: HOSPADM

## 2019-01-18 RX ORDER — ONDANSETRON 2 MG/ML
4 INJECTION INTRAMUSCULAR; INTRAVENOUS EVERY 6 HOURS PRN
Status: DISCONTINUED | OUTPATIENT
Start: 2019-01-18 | End: 2019-01-18 | Stop reason: HOSPADM

## 2019-01-18 RX ORDER — LIDOCAINE HYDROCHLORIDE 10 MG/ML
INJECTION, SOLUTION EPIDURAL; INFILTRATION; INTRACAUDAL; PERINEURAL AS NEEDED
Status: DISCONTINUED | OUTPATIENT
Start: 2019-01-18 | End: 2019-01-18 | Stop reason: HOSPADM

## 2019-01-18 RX ORDER — ASPIRIN 325 MG
325 TABLET, DELAYED RELEASE (ENTERIC COATED) ORAL DAILY
Status: DISCONTINUED | OUTPATIENT
Start: 2019-01-19 | End: 2019-01-18 | Stop reason: HOSPADM

## 2019-01-18 RX ORDER — GABAPENTIN 300 MG/1
300 CAPSULE ORAL 2 TIMES DAILY
Status: DISCONTINUED | OUTPATIENT
Start: 2019-01-18 | End: 2019-01-18 | Stop reason: HOSPADM

## 2019-01-18 RX ORDER — SODIUM CHLORIDE 9 MG/ML
3 INJECTION, SOLUTION INTRAVENOUS CONTINUOUS
Status: ACTIVE | OUTPATIENT
Start: 2019-01-18 | End: 2019-01-18

## 2019-01-18 RX ORDER — CITALOPRAM 40 MG/1
40 TABLET ORAL DAILY
Status: DISCONTINUED | OUTPATIENT
Start: 2019-01-18 | End: 2019-01-18 | Stop reason: HOSPADM

## 2019-01-18 RX ORDER — HYDROCODONE BITARTRATE AND ACETAMINOPHEN 10; 325 MG/1; MG/1
1 TABLET ORAL EVERY 6 HOURS PRN
Status: DISCONTINUED | OUTPATIENT
Start: 2019-01-18 | End: 2019-01-18 | Stop reason: HOSPADM

## 2019-01-18 RX ORDER — CLOPIDOGREL BISULFATE 75 MG/1
75 TABLET ORAL DAILY
Qty: 90 TABLET | Refills: 3 | Status: SHIPPED | OUTPATIENT
Start: 2019-01-18 | End: 2019-02-19

## 2019-01-18 RX ORDER — TRAZODONE HYDROCHLORIDE 100 MG/1
100 TABLET ORAL NIGHTLY
Status: DISCONTINUED | OUTPATIENT
Start: 2019-01-18 | End: 2019-01-18 | Stop reason: HOSPADM

## 2019-01-18 RX ORDER — NITROGLYCERIN 0.4 MG/1
0.4 TABLET SUBLINGUAL
Status: DISCONTINUED | OUTPATIENT
Start: 2019-01-18 | End: 2019-01-18 | Stop reason: HOSPADM

## 2019-01-18 RX ORDER — BISACODYL 10 MG
10 SUPPOSITORY, RECTAL RECTAL DAILY PRN
Status: DISCONTINUED | OUTPATIENT
Start: 2019-01-18 | End: 2019-01-18 | Stop reason: HOSPADM

## 2019-01-18 RX ORDER — CYCLOBENZAPRINE HCL 10 MG
10 TABLET ORAL DAILY
Status: DISCONTINUED | OUTPATIENT
Start: 2019-01-18 | End: 2019-01-18 | Stop reason: HOSPADM

## 2019-01-18 RX ORDER — SODIUM CHLORIDE 0.9 % (FLUSH) 0.9 %
3-10 SYRINGE (ML) INJECTION AS NEEDED
Status: DISCONTINUED | OUTPATIENT
Start: 2019-01-18 | End: 2019-01-18 | Stop reason: HOSPADM

## 2019-01-18 RX ORDER — CLOPIDOGREL BISULFATE 75 MG/1
600 TABLET ORAL ONCE
Status: DISCONTINUED | OUTPATIENT
Start: 2019-01-18 | End: 2019-01-18 | Stop reason: HOSPADM

## 2019-01-18 RX ORDER — ALPRAZOLAM 0.25 MG/1
0.25 TABLET ORAL 3 TIMES DAILY PRN
Status: DISCONTINUED | OUTPATIENT
Start: 2019-01-18 | End: 2019-01-18 | Stop reason: SDUPTHER

## 2019-01-18 RX ORDER — CLOPIDOGREL BISULFATE 75 MG/1
75 TABLET ORAL DAILY
Status: DISCONTINUED | OUTPATIENT
Start: 2019-01-19 | End: 2019-01-18 | Stop reason: HOSPADM

## 2019-01-18 RX ADMIN — SODIUM CHLORIDE 3 ML/KG/HR: 9 INJECTION, SOLUTION INTRAVENOUS at 10:06

## 2019-01-18 RX ADMIN — ASPIRIN 325 MG ORAL TABLET 325 MG: 325 PILL ORAL at 10:06

## 2019-01-21 ENCOUNTER — CALL CENTER PROGRAMS (OUTPATIENT)
Dept: CALL CENTER | Facility: HOSPITAL | Age: 69
End: 2019-01-21

## 2019-01-21 LAB — ACT BLD: 362 SECONDS (ref 82–152)

## 2019-01-21 NOTE — OUTREACH NOTE
PCI Survey      Responses   Facility patient discharged from?  [facility]   Procedure date  01/18/19   PCI site:  Left, Arm   Performing MD Dr. Derek Jackson   Attempt successful?  Yes   Call start time  0923   Call end time  0930   Has the patient had any of the following symptoms since discharge?  Dizziness or lightheadedness   Nursing interventions  Advised to call MD, Patient education provided, Advised to call clinic   Symptom comments  Complains of dizziness and sinus infection sx. Advised to check BP and Call MD PRN. Advised about fall precautions. No other complaints or sx.   Is the patient taking prescribed medications:  Plavix   Nursing intervention  Reminded to continue to take prescribed medications   Nursing intervention  Patient education provided   Does the patient have an appointment scheduled with the cardiologist?  Yes   Did the patient feel prepared to go home on the same day as the procedure?  Yes   Is the patient satisfied with the same day discharge process?  Yes   PCI call completed  Yes          Haris Anguiano RN

## 2019-01-22 ENCOUNTER — OFFICE VISIT (OUTPATIENT)
Dept: PRIMARY CARE CLINIC | Age: 69
End: 2019-01-22
Payer: MEDICARE

## 2019-01-22 VITALS
OXYGEN SATURATION: 96 % | HEART RATE: 86 BPM | SYSTOLIC BLOOD PRESSURE: 110 MMHG | WEIGHT: 134 LBS | DIASTOLIC BLOOD PRESSURE: 60 MMHG | BODY MASS INDEX: 20.99 KG/M2

## 2019-01-22 DIAGNOSIS — J44.1 COPD EXACERBATION (HCC): Primary | ICD-10-CM

## 2019-01-22 PROCEDURE — G8482 FLU IMMUNIZE ORDER/ADMIN: HCPCS | Performed by: NURSE PRACTITIONER

## 2019-01-22 PROCEDURE — G8598 ASA/ANTIPLAT THER USED: HCPCS | Performed by: NURSE PRACTITIONER

## 2019-01-22 PROCEDURE — G8926 SPIRO NO PERF OR DOC: HCPCS | Performed by: NURSE PRACTITIONER

## 2019-01-22 PROCEDURE — 3023F SPIROM DOC REV: CPT | Performed by: NURSE PRACTITIONER

## 2019-01-22 PROCEDURE — G8427 DOCREV CUR MEDS BY ELIG CLIN: HCPCS | Performed by: NURSE PRACTITIONER

## 2019-01-22 PROCEDURE — 1123F ACP DISCUSS/DSCN MKR DOCD: CPT | Performed by: NURSE PRACTITIONER

## 2019-01-22 PROCEDURE — 99213 OFFICE O/P EST LOW 20 MIN: CPT | Performed by: NURSE PRACTITIONER

## 2019-01-22 PROCEDURE — 3017F COLORECTAL CA SCREEN DOC REV: CPT | Performed by: NURSE PRACTITIONER

## 2019-01-22 PROCEDURE — 1101F PT FALLS ASSESS-DOCD LE1/YR: CPT | Performed by: NURSE PRACTITIONER

## 2019-01-22 PROCEDURE — 4040F PNEUMOC VAC/ADMIN/RCVD: CPT | Performed by: NURSE PRACTITIONER

## 2019-01-22 PROCEDURE — G8420 CALC BMI NORM PARAMETERS: HCPCS | Performed by: NURSE PRACTITIONER

## 2019-01-22 PROCEDURE — 1036F TOBACCO NON-USER: CPT | Performed by: NURSE PRACTITIONER

## 2019-01-22 RX ORDER — LEVOFLOXACIN 500 MG/1
500 TABLET, FILM COATED ORAL DAILY
Qty: 10 TABLET | Refills: 0 | Status: SHIPPED | OUTPATIENT
Start: 2019-01-22 | End: 2019-02-01

## 2019-01-22 RX ORDER — PREDNISONE 10 MG/1
10 TABLET ORAL DAILY
Qty: 42 TABLET | Refills: 0 | Status: SHIPPED | OUTPATIENT
Start: 2019-01-22 | End: 2019-02-07 | Stop reason: ALTCHOICE

## 2019-01-22 RX ORDER — CLOPIDOGREL BISULFATE 75 MG/1
75 TABLET ORAL
COMMUNITY
Start: 2019-01-18 | End: 2019-02-22 | Stop reason: ALTCHOICE

## 2019-01-22 ASSESSMENT — ENCOUNTER SYMPTOMS
VOMITING: 0
COUGH: 1
SINUS PRESSURE: 1
NAUSEA: 0
EYE PAIN: 0
ABDOMINAL PAIN: 0
SHORTNESS OF BREATH: 0
SORE THROAT: 0

## 2019-02-01 ENCOUNTER — OFFICE VISIT (OUTPATIENT)
Dept: PRIMARY CARE CLINIC | Age: 69
End: 2019-02-01
Payer: MEDICARE

## 2019-02-01 VITALS
WEIGHT: 129 LBS | OXYGEN SATURATION: 98 % | BODY MASS INDEX: 20.2 KG/M2 | HEART RATE: 96 BPM | TEMPERATURE: 97.8 F | DIASTOLIC BLOOD PRESSURE: 80 MMHG | SYSTOLIC BLOOD PRESSURE: 110 MMHG

## 2019-02-01 DIAGNOSIS — K12.1 STOMATITIS: ICD-10-CM

## 2019-02-01 DIAGNOSIS — J44.1 COPD EXACERBATION (HCC): Primary | ICD-10-CM

## 2019-02-01 PROCEDURE — G8427 DOCREV CUR MEDS BY ELIG CLIN: HCPCS | Performed by: NURSE PRACTITIONER

## 2019-02-01 PROCEDURE — 3017F COLORECTAL CA SCREEN DOC REV: CPT | Performed by: NURSE PRACTITIONER

## 2019-02-01 PROCEDURE — 99213 OFFICE O/P EST LOW 20 MIN: CPT | Performed by: NURSE PRACTITIONER

## 2019-02-01 PROCEDURE — G8420 CALC BMI NORM PARAMETERS: HCPCS | Performed by: NURSE PRACTITIONER

## 2019-02-01 PROCEDURE — 1036F TOBACCO NON-USER: CPT | Performed by: NURSE PRACTITIONER

## 2019-02-01 PROCEDURE — 1101F PT FALLS ASSESS-DOCD LE1/YR: CPT | Performed by: NURSE PRACTITIONER

## 2019-02-01 PROCEDURE — G8926 SPIRO NO PERF OR DOC: HCPCS | Performed by: NURSE PRACTITIONER

## 2019-02-01 PROCEDURE — 4040F PNEUMOC VAC/ADMIN/RCVD: CPT | Performed by: NURSE PRACTITIONER

## 2019-02-01 PROCEDURE — 3023F SPIROM DOC REV: CPT | Performed by: NURSE PRACTITIONER

## 2019-02-01 PROCEDURE — G8482 FLU IMMUNIZE ORDER/ADMIN: HCPCS | Performed by: NURSE PRACTITIONER

## 2019-02-01 PROCEDURE — 1123F ACP DISCUSS/DSCN MKR DOCD: CPT | Performed by: NURSE PRACTITIONER

## 2019-02-01 PROCEDURE — G8598 ASA/ANTIPLAT THER USED: HCPCS | Performed by: NURSE PRACTITIONER

## 2019-02-01 RX ORDER — BENZONATATE 200 MG/1
200 CAPSULE ORAL 3 TIMES DAILY PRN
Qty: 30 CAPSULE | Refills: 0 | Status: SHIPPED | OUTPATIENT
Start: 2019-02-01 | End: 2019-02-08

## 2019-02-01 RX ORDER — METHYLPREDNISOLONE 4 MG/1
TABLET ORAL
Qty: 1 KIT | Refills: 0 | Status: SHIPPED | OUTPATIENT
Start: 2019-02-01 | End: 2019-02-22 | Stop reason: ALTCHOICE

## 2019-02-01 RX ORDER — AZITHROMYCIN 250 MG/1
TABLET, FILM COATED ORAL
Qty: 6 TABLET | Refills: 0 | Status: SHIPPED | OUTPATIENT
Start: 2019-02-01 | End: 2019-02-07 | Stop reason: ALTCHOICE

## 2019-02-01 ASSESSMENT — ENCOUNTER SYMPTOMS
SHORTNESS OF BREATH: 0
ABDOMINAL PAIN: 0
EYE PAIN: 0
SORE THROAT: 1
COUGH: 1
NAUSEA: 0
VOMITING: 0

## 2019-02-07 ENCOUNTER — OFFICE VISIT (OUTPATIENT)
Dept: PRIMARY CARE CLINIC | Age: 69
End: 2019-02-07
Payer: MEDICARE

## 2019-02-07 VITALS
OXYGEN SATURATION: 97 % | DIASTOLIC BLOOD PRESSURE: 60 MMHG | BODY MASS INDEX: 20.99 KG/M2 | WEIGHT: 134 LBS | SYSTOLIC BLOOD PRESSURE: 114 MMHG | HEART RATE: 96 BPM

## 2019-02-07 DIAGNOSIS — K12.1 STOMATITIS: ICD-10-CM

## 2019-02-07 DIAGNOSIS — J44.1 COPD EXACERBATION (HCC): Primary | ICD-10-CM

## 2019-02-07 DIAGNOSIS — F41.9 ANXIETY: ICD-10-CM

## 2019-02-07 PROCEDURE — 96372 THER/PROPH/DIAG INJ SC/IM: CPT | Performed by: NURSE PRACTITIONER

## 2019-02-07 PROCEDURE — G8482 FLU IMMUNIZE ORDER/ADMIN: HCPCS | Performed by: NURSE PRACTITIONER

## 2019-02-07 PROCEDURE — 4040F PNEUMOC VAC/ADMIN/RCVD: CPT | Performed by: NURSE PRACTITIONER

## 2019-02-07 PROCEDURE — G8926 SPIRO NO PERF OR DOC: HCPCS | Performed by: NURSE PRACTITIONER

## 2019-02-07 PROCEDURE — 1036F TOBACCO NON-USER: CPT | Performed by: NURSE PRACTITIONER

## 2019-02-07 PROCEDURE — G8420 CALC BMI NORM PARAMETERS: HCPCS | Performed by: NURSE PRACTITIONER

## 2019-02-07 PROCEDURE — G8427 DOCREV CUR MEDS BY ELIG CLIN: HCPCS | Performed by: NURSE PRACTITIONER

## 2019-02-07 PROCEDURE — G8598 ASA/ANTIPLAT THER USED: HCPCS | Performed by: NURSE PRACTITIONER

## 2019-02-07 PROCEDURE — 3017F COLORECTAL CA SCREEN DOC REV: CPT | Performed by: NURSE PRACTITIONER

## 2019-02-07 PROCEDURE — 1123F ACP DISCUSS/DSCN MKR DOCD: CPT | Performed by: NURSE PRACTITIONER

## 2019-02-07 PROCEDURE — 1101F PT FALLS ASSESS-DOCD LE1/YR: CPT | Performed by: NURSE PRACTITIONER

## 2019-02-07 PROCEDURE — 3023F SPIROM DOC REV: CPT | Performed by: NURSE PRACTITIONER

## 2019-02-07 PROCEDURE — 99213 OFFICE O/P EST LOW 20 MIN: CPT | Performed by: NURSE PRACTITIONER

## 2019-02-07 RX ORDER — PREDNISONE 10 MG/1
10 TABLET ORAL DAILY
Qty: 42 TABLET | Refills: 0 | Status: SHIPPED | OUTPATIENT
Start: 2019-02-07 | End: 2019-03-21

## 2019-02-07 RX ORDER — DEXAMETHASONE SODIUM PHOSPHATE 10 MG/ML
10 INJECTION INTRAMUSCULAR; INTRAVENOUS ONCE
Status: COMPLETED | OUTPATIENT
Start: 2019-02-07 | End: 2019-02-07

## 2019-02-07 RX ORDER — FLUTICASONE PROPIONATE 50 MCG
2 SPRAY, SUSPENSION (ML) NASAL DAILY
Qty: 1 BOTTLE | Refills: 5 | Status: SHIPPED | OUTPATIENT
Start: 2019-02-07 | End: 2019-08-01 | Stop reason: SDUPTHER

## 2019-02-07 RX ORDER — FLUTICASONE PROPIONATE 50 MCG
SPRAY, SUSPENSION (ML) NASAL
Qty: 16 G | Refills: 3 | OUTPATIENT
Start: 2019-02-07

## 2019-02-07 RX ORDER — LEVOFLOXACIN 500 MG/1
500 TABLET, FILM COATED ORAL DAILY
Qty: 10 TABLET | Refills: 0 | Status: SHIPPED | OUTPATIENT
Start: 2019-02-07 | End: 2019-02-17

## 2019-02-07 RX ORDER — ALPRAZOLAM 1 MG/1
TABLET ORAL
Qty: 60 TABLET | Refills: 0 | Status: SHIPPED | OUTPATIENT
Start: 2019-02-07 | End: 2019-03-14 | Stop reason: SDUPTHER

## 2019-02-07 RX ADMIN — DEXAMETHASONE SODIUM PHOSPHATE 10 MG: 10 INJECTION INTRAMUSCULAR; INTRAVENOUS at 14:22

## 2019-02-07 ASSESSMENT — ENCOUNTER SYMPTOMS
VOMITING: 0
ABDOMINAL PAIN: 0
NAUSEA: 0
SHORTNESS OF BREATH: 0
SORE THROAT: 0
EYE PAIN: 0
COUGH: 1

## 2019-02-07 ASSESSMENT — PATIENT HEALTH QUESTIONNAIRE - PHQ9
SUM OF ALL RESPONSES TO PHQ QUESTIONS 1-9: 0
2. FEELING DOWN, DEPRESSED OR HOPELESS: 0
SUM OF ALL RESPONSES TO PHQ9 QUESTIONS 1 & 2: 0
SUM OF ALL RESPONSES TO PHQ QUESTIONS 1-9: 0
1. LITTLE INTEREST OR PLEASURE IN DOING THINGS: 0

## 2019-02-19 ENCOUNTER — OFFICE VISIT (OUTPATIENT)
Dept: CARDIOLOGY | Facility: CLINIC | Age: 69
End: 2019-02-19

## 2019-02-19 VITALS
WEIGHT: 135 LBS | DIASTOLIC BLOOD PRESSURE: 70 MMHG | BODY MASS INDEX: 21.19 KG/M2 | HEART RATE: 108 BPM | SYSTOLIC BLOOD PRESSURE: 130 MMHG | HEIGHT: 67 IN | OXYGEN SATURATION: 96 %

## 2019-02-19 DIAGNOSIS — I65.23 BILATERAL CAROTID ARTERY STENOSIS: ICD-10-CM

## 2019-02-19 DIAGNOSIS — I25.10 CORONARY ARTERY DISEASE INVOLVING NATIVE CORONARY ARTERY OF NATIVE HEART WITHOUT ANGINA PECTORIS: Primary | ICD-10-CM

## 2019-02-19 DIAGNOSIS — E78.00 HYPERCHOLESTEROLEMIA: ICD-10-CM

## 2019-02-19 PROCEDURE — 99214 OFFICE O/P EST MOD 30 MIN: CPT | Performed by: INTERNAL MEDICINE

## 2019-02-19 RX ORDER — PREDNISONE 10 MG/1
10 TABLET ORAL DAILY
COMMUNITY
End: 2019-04-25

## 2019-02-19 RX ORDER — PRAVASTATIN SODIUM 40 MG
40 TABLET ORAL DAILY
COMMUNITY

## 2019-02-19 RX ORDER — METHYLPREDNISOLONE 4 MG/1
4 TABLET ORAL DAILY
COMMUNITY
End: 2019-04-25

## 2019-02-19 RX ORDER — BENZONATATE 200 MG/1
200 CAPSULE ORAL 3 TIMES DAILY PRN
COMMUNITY
End: 2019-04-25

## 2019-02-19 RX ORDER — LEVOFLOXACIN 500 MG/1
500 TABLET, FILM COATED ORAL DAILY
COMMUNITY
End: 2019-04-25

## 2019-02-19 NOTE — PROGRESS NOTES
[facility]  Office visit  Geronimo Pizano  1950  [phone]    VISIT DATE:  03/20/2018    PCP: Johana Garcia APRN  [address] [facility] [address]    CC:  No chief complaint on file.      PROBLEM LIST:  1. Coronary artery disease:  a. Remote bare-metal stent, April 1999.  b. Recurrent stenting of left anterior descending coronary artery in 2003.  c. Cardiac catheterization in August 2007, revealing normal EF with 25% in-stent stenosis in the left anterior descending coronary artery and 50% stenosis in the first obtuse marginal branch of the circumflex.  d. Left heart catheterization, 06/16/2009: 25% in-stent restenosis of the LAD, normal EF.  e. Normal nuclear perfusion study, 03/19/2015, Nora Guzmán MD, revealing no evidence of reducible ischemia, normal LV systolic function and wall motion.   f. Echo 10/2018       -Ejection fraction is visually estimated to be 60-65 %.        -Mild concentric left ventricular hypertrophy is present.        -Diastolic filling parameters suggests grade I diastolic dysfunction .        -Mild-to-moderate aortic regurgitation is present.      G. cardiac catheterization January 18, 2019  · 90% proximal LAD stenosis treated with 3.0 x 12 mm Debbie drug-eluting stent  · Patent proximal and mid LAD stents.  · 90% distal LAD stenosis  · 30-40% distal left main stenosis  · Elevated LVEDP  · No aortic stenosis  2. Aortic insufficiency, mild-moderate  3. Carotid artery disease:  a. Asymptomatic.  b. Carotid duplex, 08/11/2012: 40% to 60% stenosis of the left ICA, less than 40% of the right ICA.  c. Carotid ultrasound, 10/04/2013: No change from previous exams, with 30% stenosis on the right carotid artery and 50% on the left.  d. March 2018:1. Right internal carotid artery estimated diameter reduction:  20-49 %.  2. Left internal carotid artery estimated diameter reduction:  20-49 %. However, the ICA:ECA ratio is elevated suggesting that  this  may represent a 50-69% stenosis similar to that seen on the  study of 12/27/2016.  4. Palpitations: Holter January 2018-frequent premature atrial contractions and premature ventricular contractions.  5. Hypercholesterolemia.  6. Chronic back pain.  7. Recent smoking cessation.  8. Mild anxiety.  9. Acid reflux.  10. Chronic obstructive pulmonary disease.  11. Surgical history: Remote appendectomy.    ASSESSMENT:   Diagnosis Plan   1. Coronary artery disease involving native coronary artery of native heart without angina pectoris     2. Hypercholesterolemia     3. Bilateral carotid artery stenosis         PLAN:  Coronary artery disease: Angina resolved after recent percutaneous LAD intervention.  Switching Plavix to Brilinta due to dyspepsia.  Stressed importance of dual antiplatelet therapy.  Previously intolerant to high intensity statin therapy.    Hypercholesterolemia: Goal LDL less than 70.  Continue statin.    Peripheral vascular disease: Mild bilateral carotid disease. Continue current medical therapy with carotid ultrasound imaging every 2-3 years.    Nicotine addiction: Counseled on need for smoking cessation.    Subjective  Episodes of chest discomfort have resolved.  He reports easy bruising after starting Plavix.  He also notices some dyspnea that she had abdominal discomfort about an hour after taking his Plavix.  He stopped taking Plavix 2 days ago.  Has been having issues with productive cough, wheezing and shortness of breath.  Has been on a course of antibiotics and a steroid taper.. Blood pressures running less than 140/90 mmHg.  He is compliant with medical therapy.  Denies easy bruising or bleeding complications.  No myalgias on statin therapy.     PHYSICAL EXAMINATION:  There were no vitals filed for this visit.  General Appearance:    Alert, cooperative, no distress, appears stated age   Head:    Normocephalic, without obvious abnormality, atraumatic   Eyes:    conjunctiva/corneas clear   Nose:    Nares normal, septum midline, mucosa normal, no drainage   Throat:   Lips, teeth and gums normal   Neck:   Supple, symmetrical, trachea midline, no carotid    bruit or JVD   Lungs:     Somewhat diminished throughout, scattered expiratory wheezing, respirations unlabored   Chest Wall:    No tenderness or deformity    Heart:    Regular rate and rhythm, S1 and S2 normal, no murmur, rub   or gallop, normal carotid impulse bilaterally without bruit.   Abdomen:     Soft, non-tender   Extremities:   Extremities normal, atraumatic, no cyanosis or edema   Pulses:   2+ and symmetric all extremities   Skin:   Skin color, texture, turgor normal, no rashes or lesions       Diagnostic Data:  Procedures  Lab Results   Component Value Date    TRIG 126 01/18/2019    HDL 43 01/18/2019     Lab Results   Component Value Date    GLUCOSE 127 (H) 01/18/2019    BUN 7 (L) 01/18/2019    CREATININE 0.81 01/18/2019     01/18/2019    K 4.3 01/18/2019     01/18/2019    CO2 29.0 01/18/2019     Lab Results   Component Value Date    HGBA1C 6.20 (H) 01/18/2019     Lab Results   Component Value Date    WBC 6.67 01/18/2019    HGB 17.1 01/18/2019    HCT 49.2 01/18/2019     01/18/2019       Allergies  Allergies   Allergen Reactions   • Metoprolol Shortness Of Breath   • Cetirizine      Insomnia   • Fluoxetine      Dizziness and stomach pain   • Promethazine      itching   • Rosuvastatin Other (See Comments)     Myalgias   • Sulfa Antibiotics      Oral blisters, diarrhea   • Varenicline Swelling     Throat swelling   • Penicillins Rash       Current Medications    Current Outpatient Medications:   •  albuterol (PROVENTIL HFA;VENTOLIN HFA) 108 (90 Base) MCG/ACT inhaler, Inhale 2 puffs Every 4 (Four) Hours As Needed for Wheezing., Disp: , Rfl:   •  ALPRAZolam (XANAX) 1 MG tablet, Take 1 mg by mouth 2 (Two) Times a Day As Needed for Anxiety., Disp: , Rfl:   •  amLODIPine (NORVASC) 2.5 MG tablet, Take 1 tablet by mouth Daily., Disp: 90  tablet, Rfl: 3  •  aspirin 81 MG EC tablet, Take 81 mg by mouth Daily., Disp: , Rfl:   •  citalopram (CeleXA) 40 MG tablet, Take 40 mg by mouth Daily., Disp: , Rfl: 5  •  clopidogrel (PLAVIX) 75 MG tablet, Take 1 tablet by mouth Daily., Disp: 90 tablet, Rfl: 3  •  cyclobenzaprine (FLEXERIL) 10 MG tablet, Take 10 mg by mouth Daily., Disp: , Rfl:   •  fluticasone (FLONASE) 50 MCG/ACT nasal spray, 2 sprays into the nostril(s) as directed by provider As Needed., Disp: , Rfl:   •  gabapentin (NEURONTIN) 300 MG capsule, Take 300 mg by mouth 2 (Two) Times a Day., Disp: , Rfl:   •  HYDROcodone-acetaminophen (NORCO)  MG per tablet, Take 1 tablet by mouth Every 6 (Six) Hours As Needed for Moderate Pain (4-6)., Disp: , Rfl:   •  nitroglycerin (NITROSTAT) 0.4 MG SL tablet, 1 under the tongue as needed for angina, may repeat q5mins for up three doses, Disp: 100 tablet, Rfl: 11  •  pravastatin (PRAVACHOL) 80 MG tablet, Take 80 mg by mouth Daily., Disp: , Rfl:   •  tamsulosin (FLOMAX) 0.4 MG capsule 24 hr capsule, Take 1 capsule by mouth As Needed., Disp: , Rfl:   •  traZODone (DESYREL) 100 MG tablet, Take 100 mg by mouth Every Night. 2 tabs at night, Disp: , Rfl:           ROS  Review of Systems   Cardiovascular: Negative for chest pain and irregular heartbeat.   Respiratory: Positive for cough, shortness of breath, sleep disturbances due to breathing, snoring and wheezing.    Neurological: Positive for dizziness.         SOCIAL HX  Social History     Socioeconomic History   • Marital status:      Spouse name: Not on file   • Number of children: Not on file   • Years of education: Not on file   • Highest education level: Not on file   Social Needs   • Financial resource strain: Not on file   • Food insecurity - worry: Not on file   • Food insecurity - inability: Not on file   • Transportation needs - medical: Not on file   • Transportation needs - non-medical: Not on file   Occupational History   • Not on file    Tobacco Use   • Smoking status: Current Every Day Smoker     Packs/day: 0.50     Types: Cigars   • Smokeless tobacco: Never Used   Substance and Sexual Activity   • Alcohol use: No   • Drug use: No   • Sexual activity: Defer   Other Topics Concern   • Not on file   Social History Narrative   • Not on file       FAMILY HX  Family History   Problem Relation Age of Onset   • Diabetes Father    • Heart disease Father    • Stroke Brother    • Coronary artery disease Other    • Diabetes Other    • Diabetes Mother    • Leukemia Sister    • Heart failure Sister    • Leukemia Daughter              Derek Jackson III, MD, FACC

## 2019-02-22 ENCOUNTER — HOSPITAL ENCOUNTER (OUTPATIENT)
Facility: HOSPITAL | Age: 69
Discharge: HOME | End: 2019-02-22
Payer: MEDICARE

## 2019-02-22 ENCOUNTER — HOSPITAL ENCOUNTER (OUTPATIENT)
Dept: GENERAL RADIOLOGY | Facility: HOSPITAL | Age: 69
Discharge: HOME | End: 2019-02-22
Payer: MEDICARE

## 2019-02-22 ENCOUNTER — OFFICE VISIT (OUTPATIENT)
Dept: PRIMARY CARE CLINIC | Age: 69
End: 2019-02-22
Payer: MEDICARE

## 2019-02-22 VITALS
SYSTOLIC BLOOD PRESSURE: 120 MMHG | HEART RATE: 88 BPM | WEIGHT: 139 LBS | TEMPERATURE: 98.1 F | BODY MASS INDEX: 21.77 KG/M2 | RESPIRATION RATE: 18 BRPM | DIASTOLIC BLOOD PRESSURE: 62 MMHG | OXYGEN SATURATION: 97 %

## 2019-02-22 DIAGNOSIS — J44.1 COPD EXACERBATION (HCC): ICD-10-CM

## 2019-02-22 DIAGNOSIS — R04.2 HEMOPTYSIS: ICD-10-CM

## 2019-02-22 DIAGNOSIS — R05.9 COUGH: Primary | ICD-10-CM

## 2019-02-22 DIAGNOSIS — R05.9 COUGH: ICD-10-CM

## 2019-02-22 PROCEDURE — 96372 THER/PROPH/DIAG INJ SC/IM: CPT | Performed by: NURSE PRACTITIONER

## 2019-02-22 PROCEDURE — G8926 SPIRO NO PERF OR DOC: HCPCS | Performed by: NURSE PRACTITIONER

## 2019-02-22 PROCEDURE — 3017F COLORECTAL CA SCREEN DOC REV: CPT | Performed by: NURSE PRACTITIONER

## 2019-02-22 PROCEDURE — 4040F PNEUMOC VAC/ADMIN/RCVD: CPT | Performed by: NURSE PRACTITIONER

## 2019-02-22 PROCEDURE — 71046 X-RAY EXAM CHEST 2 VIEWS: CPT

## 2019-02-22 PROCEDURE — 1036F TOBACCO NON-USER: CPT | Performed by: NURSE PRACTITIONER

## 2019-02-22 PROCEDURE — G8482 FLU IMMUNIZE ORDER/ADMIN: HCPCS | Performed by: NURSE PRACTITIONER

## 2019-02-22 PROCEDURE — G8598 ASA/ANTIPLAT THER USED: HCPCS | Performed by: NURSE PRACTITIONER

## 2019-02-22 PROCEDURE — 1123F ACP DISCUSS/DSCN MKR DOCD: CPT | Performed by: NURSE PRACTITIONER

## 2019-02-22 PROCEDURE — 1101F PT FALLS ASSESS-DOCD LE1/YR: CPT | Performed by: NURSE PRACTITIONER

## 2019-02-22 PROCEDURE — 99213 OFFICE O/P EST LOW 20 MIN: CPT | Performed by: NURSE PRACTITIONER

## 2019-02-22 PROCEDURE — G8427 DOCREV CUR MEDS BY ELIG CLIN: HCPCS | Performed by: NURSE PRACTITIONER

## 2019-02-22 PROCEDURE — 3023F SPIROM DOC REV: CPT | Performed by: NURSE PRACTITIONER

## 2019-02-22 PROCEDURE — G8420 CALC BMI NORM PARAMETERS: HCPCS | Performed by: NURSE PRACTITIONER

## 2019-02-22 RX ORDER — TICAGRELOR 90 MG/1
1 TABLET ORAL 2 TIMES DAILY
Refills: 11 | COMMUNITY
Start: 2019-02-19 | End: 2019-08-05 | Stop reason: SDUPTHER

## 2019-02-22 RX ORDER — CEPHALEXIN 500 MG/1
500 CAPSULE ORAL 2 TIMES DAILY
Qty: 20 CAPSULE | Refills: 0 | Status: SHIPPED | OUTPATIENT
Start: 2019-02-22 | End: 2019-03-21

## 2019-02-22 RX ORDER — DEXAMETHASONE SODIUM PHOSPHATE 10 MG/ML
10 INJECTION INTRAMUSCULAR; INTRAVENOUS ONCE
Status: COMPLETED | OUTPATIENT
Start: 2019-02-22 | End: 2019-02-22

## 2019-02-22 RX ORDER — CEFTRIAXONE 1 G/1
1 INJECTION, POWDER, FOR SOLUTION INTRAMUSCULAR; INTRAVENOUS ONCE
Status: COMPLETED | OUTPATIENT
Start: 2019-02-22 | End: 2019-02-22

## 2019-02-22 RX ADMIN — DEXAMETHASONE SODIUM PHOSPHATE 10 MG: 10 INJECTION INTRAMUSCULAR; INTRAVENOUS at 14:36

## 2019-02-22 RX ADMIN — CEFTRIAXONE 1 G: 1 INJECTION, POWDER, FOR SOLUTION INTRAMUSCULAR; INTRAVENOUS at 14:35

## 2019-02-24 ASSESSMENT — ENCOUNTER SYMPTOMS
COUGH: 1
VOMITING: 0
SHORTNESS OF BREATH: 1
EYE PAIN: 0
SORE THROAT: 0
ABDOMINAL PAIN: 0
WHEEZING: 1
NAUSEA: 0

## 2019-03-14 DIAGNOSIS — F41.9 ANXIETY: ICD-10-CM

## 2019-03-14 RX ORDER — ALPRAZOLAM 1 MG/1
TABLET ORAL
Qty: 60 TABLET | Refills: 0 | Status: SHIPPED | OUTPATIENT
Start: 2019-03-14 | End: 2019-04-11 | Stop reason: SDUPTHER

## 2019-03-21 ENCOUNTER — OFFICE VISIT (OUTPATIENT)
Dept: PRIMARY CARE CLINIC | Age: 69
End: 2019-03-21
Payer: MEDICARE

## 2019-03-21 VITALS
SYSTOLIC BLOOD PRESSURE: 118 MMHG | DIASTOLIC BLOOD PRESSURE: 60 MMHG | BODY MASS INDEX: 21.14 KG/M2 | OXYGEN SATURATION: 95 % | HEART RATE: 86 BPM | WEIGHT: 135 LBS

## 2019-03-21 DIAGNOSIS — R73.03 BORDERLINE TYPE 2 DIABETES MELLITUS: ICD-10-CM

## 2019-03-21 DIAGNOSIS — R73.9 ELEVATED BLOOD SUGAR: Primary | ICD-10-CM

## 2019-03-21 DIAGNOSIS — I25.119 CORONARY ARTERY DISEASE INVOLVING NATIVE HEART WITH ANGINA PECTORIS, UNSPECIFIED VESSEL OR LESION TYPE (HCC): ICD-10-CM

## 2019-03-21 DIAGNOSIS — F41.9 ANXIETY: ICD-10-CM

## 2019-03-21 LAB — HBA1C MFR BLD: 6.3 %

## 2019-03-21 PROCEDURE — G8482 FLU IMMUNIZE ORDER/ADMIN: HCPCS | Performed by: NURSE PRACTITIONER

## 2019-03-21 PROCEDURE — 3017F COLORECTAL CA SCREEN DOC REV: CPT | Performed by: NURSE PRACTITIONER

## 2019-03-21 PROCEDURE — G8427 DOCREV CUR MEDS BY ELIG CLIN: HCPCS | Performed by: NURSE PRACTITIONER

## 2019-03-21 PROCEDURE — 4040F PNEUMOC VAC/ADMIN/RCVD: CPT | Performed by: NURSE PRACTITIONER

## 2019-03-21 PROCEDURE — 1123F ACP DISCUSS/DSCN MKR DOCD: CPT | Performed by: NURSE PRACTITIONER

## 2019-03-21 PROCEDURE — G8420 CALC BMI NORM PARAMETERS: HCPCS | Performed by: NURSE PRACTITIONER

## 2019-03-21 PROCEDURE — G8598 ASA/ANTIPLAT THER USED: HCPCS | Performed by: NURSE PRACTITIONER

## 2019-03-21 PROCEDURE — 1036F TOBACCO NON-USER: CPT | Performed by: NURSE PRACTITIONER

## 2019-03-21 PROCEDURE — 99213 OFFICE O/P EST LOW 20 MIN: CPT | Performed by: NURSE PRACTITIONER

## 2019-03-21 PROCEDURE — 83036 HEMOGLOBIN GLYCOSYLATED A1C: CPT | Performed by: NURSE PRACTITIONER

## 2019-03-21 ASSESSMENT — ENCOUNTER SYMPTOMS
EYE PAIN: 0
SORE THROAT: 0
ABDOMINAL PAIN: 0
COUGH: 0
NAUSEA: 0
SHORTNESS OF BREATH: 0
VOMITING: 0

## 2019-03-29 ASSESSMENT — ENCOUNTER SYMPTOMS: BACK PAIN: 1

## 2019-04-06 RX ORDER — TAMSULOSIN HYDROCHLORIDE 0.4 MG/1
CAPSULE ORAL
Qty: 30 CAPSULE | Refills: 5 | Status: SHIPPED | OUTPATIENT
Start: 2019-04-06 | End: 2019-10-01 | Stop reason: SDUPTHER

## 2019-04-11 DIAGNOSIS — F41.9 ANXIETY: ICD-10-CM

## 2019-04-11 RX ORDER — ALPRAZOLAM 1 MG/1
TABLET ORAL
Qty: 60 TABLET | Refills: 0 | Status: SHIPPED | OUTPATIENT
Start: 2019-04-11 | End: 2019-05-11

## 2019-04-12 RX ORDER — ALPRAZOLAM 1 MG/1
TABLET ORAL
Qty: 60 TABLET | Refills: 0 | OUTPATIENT
Start: 2019-04-12

## 2019-04-15 DIAGNOSIS — F41.9 ANXIETY: ICD-10-CM

## 2019-04-16 RX ORDER — TRAZODONE HYDROCHLORIDE 100 MG/1
TABLET ORAL
Qty: 60 TABLET | Refills: 5 | Status: SHIPPED | OUTPATIENT
Start: 2019-04-16 | End: 2019-10-11 | Stop reason: SDUPTHER

## 2019-04-16 RX ORDER — ALPRAZOLAM 1 MG/1
TABLET ORAL
Qty: 60 TABLET | Refills: 0 | Status: SHIPPED | OUTPATIENT
Start: 2019-04-16 | End: 2019-06-06 | Stop reason: SDUPTHER

## 2019-04-25 ENCOUNTER — OFFICE VISIT (OUTPATIENT)
Dept: CARDIOLOGY | Facility: CLINIC | Age: 69
End: 2019-04-25

## 2019-04-25 VITALS
HEART RATE: 104 BPM | WEIGHT: 130 LBS | DIASTOLIC BLOOD PRESSURE: 60 MMHG | SYSTOLIC BLOOD PRESSURE: 130 MMHG | BODY MASS INDEX: 20.4 KG/M2 | OXYGEN SATURATION: 95 % | HEIGHT: 67 IN

## 2019-04-25 DIAGNOSIS — I25.10 CORONARY ARTERY DISEASE INVOLVING NATIVE CORONARY ARTERY OF NATIVE HEART WITHOUT ANGINA PECTORIS: Primary | ICD-10-CM

## 2019-04-25 DIAGNOSIS — E78.00 HYPERCHOLESTEROLEMIA: ICD-10-CM

## 2019-04-25 DIAGNOSIS — I65.23 BILATERAL CAROTID ARTERY STENOSIS: ICD-10-CM

## 2019-04-25 PROCEDURE — 99214 OFFICE O/P EST MOD 30 MIN: CPT | Performed by: INTERNAL MEDICINE

## 2019-04-25 RX ORDER — GABAPENTIN 600 MG/1
600 TABLET ORAL 4 TIMES DAILY
COMMUNITY
End: 2021-09-23

## 2019-04-25 NOTE — PROGRESS NOTES
[facility]  Office visit  Geronimo Pizano  1950  [phone]    VISIT DATE:  03/20/2018    PCP: Johana Garcia APRN  [address] [place] [id number]    CC:  Chief Complaint   Patient presents with   • Coronary Artery Disease   • Chest Pain   • Dizziness       PROBLEM LIST:  1. Coronary artery disease:  a. Remote bare-metal stent, April 1999.  b. Recurrent stenting of left anterior descending coronary artery in 2003.  c. Cardiac catheterization in August 2007, revealing normal EF with 25% in-stent stenosis in the left anterior descending coronary artery and 50% stenosis in the first obtuse marginal branch of the circumflex.  d. Left heart catheterization, 06/16/2009: 25% in-stent restenosis of the LAD, normal EF.  e. Normal nuclear perfusion study, 03/19/2015, Nora Guzmán MD, revealing no evidence of reducible ischemia, normal LV systolic function and wall motion.   f. Echo 10/2018       -Ejection fraction is visually estimated to be 60-65 %.        -Mild concentric left ventricular hypertrophy is present.        -Diastolic filling parameters suggests grade I diastolic dysfunction .        -Mild-to-moderate aortic regurgitation is present.      G. cardiac catheterization January 18, 2019  · 90% proximal LAD stenosis treated with 3.0 x 12 mm Debbie drug-eluting stent  · Patent proximal and mid LAD stents.  · 90% distal LAD stenosis  · 30-40% distal left main stenosis  · Elevated LVEDP  · No aortic stenosis  2. Aortic insufficiency, mild-moderate  3. Carotid artery disease:  a. Asymptomatic.  b. Carotid duplex, 08/11/2012: 40% to 60% stenosis of the left ICA, less than 40% of the right ICA.  c. Carotid ultrasound, 10/04/2013: No change from previous exams, with 30% stenosis on the right carotid artery and 50% on the left.  d. March 2018:1. Right internal carotid artery estimated diameter reduction:  20-49 %.  2. Left internal carotid artery estimated diameter  "reduction:  20-49 %. However, the ICA:ECA ratio is elevated suggesting that  this may represent a 50-69% stenosis similar to that seen on the  study of 12/27/2016.  4. Palpitations: Holter January 2018-frequent premature atrial contractions and premature ventricular contractions.  5. Hypercholesterolemia.  6. Chronic back pain.  7. Recent smoking cessation.  8. Mild anxiety.  9. Acid reflux.  10. Chronic obstructive pulmonary disease.  11. Surgical history: Remote appendectomy.    ASSESSMENT:   Diagnosis Plan   1. Coronary artery disease involving native coronary artery of native heart without angina pectoris     2. Hypercholesterolemia     3. Bilateral carotid artery stenosis         PLAN:  Coronary artery disease: Angina resolved after recent percutaneous LAD intervention.  Continue dual antiplatelet therapy for 12 months post intervention.  Previously intolerant to high intensity statin therapy.    Hypercholesterolemia: Goal LDL less than 70.  Continue statin.  Repeat fasting lipid panel in 6 months.    Peripheral vascular disease: Mild bilateral carotid disease. Continue current medical therapy with carotid ultrasound imaging every 2-3 years.    Nicotine addiction: Counseled on need for smoking cessation.    Subjective  Still smoking about 5 cigars/day.  Blood pressures running less than 140/90 mmHg.  He reports being compliant with Brilinta.  Was switched off Plavix due to  dyspepsia which resolved.  Does have intermittent mild epigastric discomfort after certain meals.  Denies palpitations.  No myalgias on statin therapy.     PHYSICAL EXAMINATION:  Vitals:    04/25/19 1420   BP: 130/60   BP Location: Right arm   Patient Position: Sitting   Pulse: 104   SpO2: 95%   Weight: 59 kg (130 lb)   Height: 170.2 cm (67\")     General Appearance:    Alert, cooperative, no distress, appears stated age   Head:    Normocephalic, without obvious abnormality, atraumatic   Eyes:    conjunctiva/corneas clear   Nose:   Nares " normal, septum midline, mucosa normal, no drainage   Throat:   Lips, teeth and gums normal   Neck:   Supple, symmetrical, trachea midline, soft left carotid bruit   Lungs:     Somewhat diminished throughout, scattered expiratory wheezing, respirations unlabored   Chest Wall:    No tenderness or deformity    Heart:    Regular rate and rhythm, S1 and S2 normal, no murmur, rub   or gallop, normal carotid impulse bilaterally without bruit.   Abdomen:     Soft, non-tender   Extremities:   Extremities normal, atraumatic, no cyanosis or edema   Pulses:   2+ and symmetric all extremities   Skin:   Skin color, texture, turgor normal, no rashes or lesions       Diagnostic Data:  Procedures  Lab Results   Component Value Date    TRIG 126 01/18/2019    HDL 43 01/18/2019     Lab Results   Component Value Date    GLUCOSE 127 (H) 01/18/2019    BUN 7 (L) 01/18/2019    CREATININE 0.81 01/18/2019     01/18/2019    K 4.3 01/18/2019     01/18/2019    CO2 29.0 01/18/2019     Lab Results   Component Value Date    HGBA1C 6.20 (H) 01/18/2019     Lab Results   Component Value Date    WBC 6.67 01/18/2019    HGB 17.1 01/18/2019    HCT 49.2 01/18/2019     01/18/2019       Allergies  Allergies   Allergen Reactions   • Metoprolol Shortness Of Breath   • Cetirizine      Insomnia   • Fluoxetine      Dizziness and stomach pain   • Promethazine      itching   • Rosuvastatin Other (See Comments)     Myalgias   • Sulfa Antibiotics      Oral blisters, diarrhea   • Varenicline Swelling     Throat swelling   • Penicillins Rash       Current Medications    Current Outpatient Medications:   •  albuterol (PROVENTIL HFA;VENTOLIN HFA) 108 (90 Base) MCG/ACT inhaler, Inhale 2 puffs Every 4 (Four) Hours As Needed for Wheezing., Disp: , Rfl:   •  ALPRAZolam (XANAX) 1 MG tablet, Take 1 mg by mouth 2 (Two) Times a Day As Needed for Anxiety., Disp: , Rfl:   •  aspirin 81 MG EC tablet, Take 81 mg by mouth Daily., Disp: , Rfl:   •  citalopram  (CeleXA) 40 MG tablet, Take 40 mg by mouth Daily., Disp: , Rfl: 5  •  cyclobenzaprine (FLEXERIL) 10 MG tablet, Take 10 mg by mouth Daily., Disp: , Rfl:   •  fluticasone (FLONASE) 50 MCG/ACT nasal spray, 2 sprays into the nostril(s) as directed by provider As Needed., Disp: , Rfl:   •  gabapentin (NEURONTIN) 600 MG tablet, Take 600 mg by mouth 4 (Four) Times a Day., Disp: , Rfl:   •  HYDROcodone-acetaminophen (NORCO)  MG per tablet, Take 1 tablet by mouth 4 (Four) Times a Day As Needed for Moderate Pain ., Disp: , Rfl:   •  nitroglycerin (NITROSTAT) 0.4 MG SL tablet, 1 under the tongue as needed for angina, may repeat q5mins for up three doses, Disp: 100 tablet, Rfl: 11  •  pravastatin (PRAVACHOL) 40 MG tablet, Take 40 mg by mouth Daily., Disp: , Rfl:   •  tamsulosin (FLOMAX) 0.4 MG capsule 24 hr capsule, Take 1 capsule by mouth As Needed., Disp: , Rfl:   •  ticagrelor (BRILINTA) 90 MG tablet tablet, Take 1 tablet by mouth 2 (Two) Times a Day., Disp: 60 tablet, Rfl: 11  •  traZODone (DESYREL) 100 MG tablet, Take 100 mg by mouth Every Night. 2 tabs at night, Disp: , Rfl:           ROS  Review of Systems   Cardiovascular: Positive for chest pain and leg swelling. Negative for irregular heartbeat.   Respiratory: Positive for cough, shortness of breath, snoring and wheezing.    Neurological: Positive for dizziness.         SOCIAL HX  Social History     Socioeconomic History   • Marital status:      Spouse name: Not on file   • Number of children: Not on file   • Years of education: Not on file   • Highest education level: Not on file   Tobacco Use   • Smoking status: Current Every Day Smoker     Packs/day: 0.50     Types: Cigars   • Smokeless tobacco: Never Used   Substance and Sexual Activity   • Alcohol use: No   • Drug use: No   • Sexual activity: Defer       FAMILY HX  Family History   Problem Relation Age of Onset   • Diabetes Father    • Heart disease Father    • Stroke Brother    • Coronary artery  disease Other    • Diabetes Other    • Diabetes Mother    • Leukemia Sister    • Heart failure Sister    • Leukemia Daughter              Derek Jackson III, MD, FACC

## 2019-05-14 RX ORDER — CITALOPRAM 40 MG/1
40 TABLET ORAL DAILY
Qty: 30 TABLET | Refills: 5 | Status: SHIPPED | OUTPATIENT
Start: 2019-05-14 | End: 2019-06-12 | Stop reason: SDUPTHER

## 2019-06-06 DIAGNOSIS — F41.9 ANXIETY: ICD-10-CM

## 2019-06-06 RX ORDER — ALPRAZOLAM 1 MG/1
TABLET ORAL
Qty: 60 TABLET | Refills: 0 | Status: SHIPPED | OUTPATIENT
Start: 2019-06-06 | End: 2019-07-05 | Stop reason: SDUPTHER

## 2019-06-12 ENCOUNTER — OFFICE VISIT (OUTPATIENT)
Dept: PRIMARY CARE CLINIC | Age: 69
End: 2019-06-12
Payer: MEDICARE

## 2019-06-12 VITALS
HEART RATE: 92 BPM | BODY MASS INDEX: 19.26 KG/M2 | SYSTOLIC BLOOD PRESSURE: 130 MMHG | OXYGEN SATURATION: 98 % | WEIGHT: 123 LBS | DIASTOLIC BLOOD PRESSURE: 60 MMHG

## 2019-06-12 DIAGNOSIS — F41.9 ANXIETY: ICD-10-CM

## 2019-06-12 DIAGNOSIS — E11.9 TYPE 2 DIABETES MELLITUS WITHOUT COMPLICATION, WITHOUT LONG-TERM CURRENT USE OF INSULIN (HCC): ICD-10-CM

## 2019-06-12 DIAGNOSIS — E78.5 HYPERLIPIDEMIA, UNSPECIFIED HYPERLIPIDEMIA TYPE: Primary | ICD-10-CM

## 2019-06-12 DIAGNOSIS — J44.9 CHRONIC OBSTRUCTIVE PULMONARY DISEASE, UNSPECIFIED COPD TYPE (HCC): ICD-10-CM

## 2019-06-12 PROCEDURE — 4040F PNEUMOC VAC/ADMIN/RCVD: CPT | Performed by: NURSE PRACTITIONER

## 2019-06-12 PROCEDURE — G8427 DOCREV CUR MEDS BY ELIG CLIN: HCPCS | Performed by: NURSE PRACTITIONER

## 2019-06-12 PROCEDURE — G8926 SPIRO NO PERF OR DOC: HCPCS | Performed by: NURSE PRACTITIONER

## 2019-06-12 PROCEDURE — G8420 CALC BMI NORM PARAMETERS: HCPCS | Performed by: NURSE PRACTITIONER

## 2019-06-12 PROCEDURE — 99213 OFFICE O/P EST LOW 20 MIN: CPT | Performed by: NURSE PRACTITIONER

## 2019-06-12 PROCEDURE — 1036F TOBACCO NON-USER: CPT | Performed by: NURSE PRACTITIONER

## 2019-06-12 PROCEDURE — 1123F ACP DISCUSS/DSCN MKR DOCD: CPT | Performed by: NURSE PRACTITIONER

## 2019-06-12 PROCEDURE — G8598 ASA/ANTIPLAT THER USED: HCPCS | Performed by: NURSE PRACTITIONER

## 2019-06-12 PROCEDURE — 2022F DILAT RTA XM EVC RTNOPTHY: CPT | Performed by: NURSE PRACTITIONER

## 2019-06-12 PROCEDURE — 3023F SPIROM DOC REV: CPT | Performed by: NURSE PRACTITIONER

## 2019-06-12 PROCEDURE — 3044F HG A1C LEVEL LT 7.0%: CPT | Performed by: NURSE PRACTITIONER

## 2019-06-12 PROCEDURE — 3017F COLORECTAL CA SCREEN DOC REV: CPT | Performed by: NURSE PRACTITIONER

## 2019-06-12 RX ORDER — ALBUTEROL SULFATE 90 UG/1
AEROSOL, METERED RESPIRATORY (INHALATION)
Qty: 8.5 G | Refills: 5 | Status: SHIPPED | OUTPATIENT
Start: 2019-06-12 | End: 2020-01-17

## 2019-06-12 RX ORDER — GABAPENTIN 800 MG/1
800 TABLET ORAL 4 TIMES DAILY
COMMUNITY
End: 2021-07-19 | Stop reason: DRUGHIGH

## 2019-06-12 RX ORDER — CITALOPRAM 40 MG/1
40 TABLET ORAL DAILY
Qty: 30 TABLET | Refills: 5 | Status: SHIPPED | OUTPATIENT
Start: 2019-06-12 | End: 2019-12-12 | Stop reason: SDUPTHER

## 2019-06-12 ASSESSMENT — ENCOUNTER SYMPTOMS
SHORTNESS OF BREATH: 0
VOMITING: 0
SORE THROAT: 0
NAUSEA: 0
EYE PAIN: 0
COUGH: 0
ABDOMINAL PAIN: 0

## 2019-06-21 NOTE — PROGRESS NOTES
SUBJECTIVE:    Patient ID: Calvin Delgado is a 70 y.o. male. Medicalhistory Review  Past Medical, Family, and Social History reviewed and does contribute to the patient presenting condition    Health Maintenance Due   Topic Date Due    AAA screen  1950    Hepatitis C screen  1950    Diabetic foot exam  12/26/1959    Diabetic retinal exam  12/26/1959    DTaP/Tdap/Td vaccine (1 - Tdap) 12/26/1968    Shingles Vaccine (1 of 2) 12/26/1999    Low dose CT lung screening  12/26/2004    Annual Wellness Visit (AWV)  12/26/2012    Pneumococcal 65+ years Vaccine (2 of 2 - PCV13) 10/26/2016    Diabetic microalbuminuria test  11/17/2018       HPI:   Chief Complaint   Patient presents with    COPD     Patient here today for a follow up. He is needing some refills today. He states that he is anxious and lonely. He doesn't talk to his family much. Patient's medications, allergies, pastmedical, surgical, social and family histories were reviewed and updated as appropriate. Review of Systems Reviewed and acurate. See MA note. OBJECTIVE:    /60 (Site: Right Upper Arm, Position: Sitting, Cuff Size: Medium Adult)   Pulse 92   Wt 123 lb (55.8 kg)   SpO2 98%   BMI 19.26 kg/m²      Physical Exam   Constitutional: He is oriented to person, place, and time. He appears well-developed and well-nourished. No distress. HENT:   Head: Normocephalic. Right Ear: Tympanic membrane normal.   Left Ear: Tympanic membrane normal.   Mouth/Throat: No oropharyngeal exudate. Eyes: Lids are normal.   Neck: Neck supple. Cardiovascular: Normal rate, regular rhythm and normal heart sounds. Pulmonary/Chest: Effort normal and breath sounds normal.   Abdominal: Soft. Bowel sounds are normal. He exhibits no distension. There is no tenderness. Musculoskeletal: He exhibits no edema. Lymphadenopathy:     He has no cervical adenopathy.    Neurological: He is alert and oriented to person, place, and time. Skin: Skin is warm and dry. Psychiatric: He has a normal mood and affect. Vitals reviewed. No results found for requested labs within last 30 days. Hemoglobin A1C (%)   Date Value   03/21/2019 6.3     Microalbumin, Random Urine (mg/dL)   Date Value   11/17/2017 <1.20     LDL Calculated (mg/dL)   Date Value   12/19/2018 87         Lab Results   Component Value Date    WBC 6.1 09/27/2018    NEUTROABS 3.9 06/27/2018    HGB 16.6 09/27/2018    HCT 49.8 09/27/2018    MCV 95.0 09/27/2018     09/27/2018       Lab Results   Component Value Date    TSH 2.74 08/02/2016       Prior to Visit Medications    Medication Sig Taking? Authorizing Provider   citalopram (CELEXA) 40 MG tablet Take 1 tablet by mouth daily Dosage increased Yes VIKI Mahmood   albuterol sulfate HFA (PROAIR HFA) 108 (90 Base) MCG/ACT inhaler INHALE 2 PUFFS INTO THE LUNGS EVERY 6 HOURS AS NEEDED FOR WHEEZING Yes VIKI Mahmood   gabapentin (NEURONTIN) 800 MG tablet Take 800 mg by mouth 4 times daily. Yes Historical Provider, MD   ALPRAZolam (XANAX) 1 MG tablet TAKE ONE TABLET BY MOUTH TWO TIMES A DAY AS NEEDED FOR SLEEP. Yes VIKI Mahmood   traZODone (DESYREL) 100 MG tablet TAKE TWO TABLETS BY MOUTH NIGHTLY FOR SLEEP Yes VIKI Mahmood   tamsulosin (FLOMAX) 0.4 MG capsule TAKE ONE CAPSULE BY MOUTH AT BEDTIME FOR PROSTATE Yes VIKI Anton   BRILINTA 90 MG TABS tablet Take 1 tablet by mouth 2 times daily Yes Historical Provider, MD   fluticasone-salmeterol (ADVAIR DISKUS) 250-50 MCG/DOSE AEPB Inhale 1 puff into the lungs 2 times daily Rinse mouth after use.  Yes VIKI Mahmood   fluticasone [facility]) 50 MCG/ACT nasal spray 2 sprays by Nasal route daily Yes VIKI Mahmood   pravastatin (PRAVACHOL) 40 MG tablet TAKE 1 AND 1/2 TABLETS BY MOUTH EVERY DAY Yes VIKI Mahmood   HYDROcodone-acetaminophen (NORCO)  MG per tablet Norco 10 mg-325 mg tablet   Take 1 tablet 4 times a day by oral route as directed for 30 days. Yes Historical Provider, MD   blood glucose monitor strips USE TO TEST BLOOD SUGAR ONCE DAILY. Dx E11.9 Yes VIKI Alcazar   cyclobenzaprine (FLEXERIL) 10 MG tablet Take 10 mg by mouth daily Yes Historical Provider, MD       ASSESSMENT:  1. Hyperlipidemia, unspecified hyperlipidemia type    2. Type 2 diabetes mellitus without complication, without long-term current use of insulin ([facility].)    3. Anxiety    4. Chronic obstructive pulmonary disease, unspecified COPD type ([facility].)          PLAN:    Orders Placed This Encounter   Medications    citalopram (CELEXA) 40 MG tablet     Sig: Take 1 tablet by mouth daily Dosage increased     Dispense:  30 tablet     Refill:  5    albuterol sulfate HFA (PROAIR HFA) 108 (90 Base) MCG/ACT inhaler     Sig: INHALE 2 PUFFS INTO THE LUNGS EVERY 6 HOURS AS NEEDED FOR WHEEZING     Dispense:  8.5 g     Refill:  5     Orders Placed This Encounter   Procedures    LIPID PANEL    COMPREHENSIVE METABOLIC PANEL    HEMOGLOBIN A1C    CBC WITH AUTO DIFFERENTIAL       Return in about 1 month (around 7/10/2019).

## 2019-07-05 DIAGNOSIS — F41.9 ANXIETY: ICD-10-CM

## 2019-07-09 RX ORDER — ALPRAZOLAM 1 MG/1
TABLET ORAL
Qty: 60 TABLET | Refills: 0 | Status: SHIPPED | OUTPATIENT
Start: 2019-07-09 | End: 2019-08-01 | Stop reason: SDUPTHER

## 2019-07-17 ENCOUNTER — OFFICE VISIT (OUTPATIENT)
Dept: PRIMARY CARE CLINIC | Age: 69
End: 2019-07-17
Payer: MEDICARE

## 2019-07-17 ENCOUNTER — HOSPITAL ENCOUNTER (OUTPATIENT)
Facility: HOSPITAL | Age: 69
Discharge: HOME | End: 2019-07-17
Payer: MEDICARE

## 2019-07-17 VITALS
OXYGEN SATURATION: 98 % | HEART RATE: 95 BPM | WEIGHT: 128 LBS | DIASTOLIC BLOOD PRESSURE: 80 MMHG | BODY MASS INDEX: 20.05 KG/M2 | SYSTOLIC BLOOD PRESSURE: 136 MMHG

## 2019-07-17 DIAGNOSIS — E11.9 TYPE 2 DIABETES MELLITUS WITHOUT COMPLICATION, WITHOUT LONG-TERM CURRENT USE OF INSULIN (HCC): ICD-10-CM

## 2019-07-17 DIAGNOSIS — E78.5 HYPERLIPIDEMIA, UNSPECIFIED HYPERLIPIDEMIA TYPE: ICD-10-CM

## 2019-07-17 DIAGNOSIS — R63.4 WEIGHT LOSS: ICD-10-CM

## 2019-07-17 DIAGNOSIS — F41.9 ANXIETY: ICD-10-CM

## 2019-07-17 DIAGNOSIS — J44.1 COPD EXACERBATION (HCC): Primary | ICD-10-CM

## 2019-07-17 LAB
A/G RATIO: 2.4 (ref 0.8–2)
ALBUMIN SERPL-MCNC: 4.5 G/DL (ref 3.4–4.8)
ALP BLD-CCNC: 70 U/L (ref 25–100)
ALT SERPL-CCNC: 26 U/L (ref 4–36)
ANION GAP SERPL CALCULATED.3IONS-SCNC: 12 MMOL/L (ref 3–16)
AST SERPL-CCNC: 19 U/L (ref 8–33)
BASOPHILS ABSOLUTE: 0.1 K/UL (ref 0–0.1)
BASOPHILS RELATIVE PERCENT: 1.1 %
BILIRUB SERPL-MCNC: 0.4 MG/DL (ref 0.3–1.2)
BUN BLDV-MCNC: 9 MG/DL (ref 6–20)
CALCIUM SERPL-MCNC: 9.3 MG/DL (ref 8.5–10.5)
CHLORIDE BLD-SCNC: 103 MMOL/L (ref 98–107)
CHOLESTEROL, TOTAL: 154 MG/DL (ref 0–200)
CO2: 26 MMOL/L (ref 20–30)
CREAT SERPL-MCNC: 0.9 MG/DL (ref 0.4–1.2)
EOSINOPHILS ABSOLUTE: 0.1 K/UL (ref 0–0.4)
EOSINOPHILS RELATIVE PERCENT: 1.1 %
GFR AFRICAN AMERICAN: >59
GFR NON-AFRICAN AMERICAN: >59
GLOBULIN: 1.9 G/DL
GLUCOSE BLD-MCNC: 123 MG/DL (ref 74–106)
HBA1C MFR BLD: 6.2 %
HCT VFR BLD CALC: 53.9 % (ref 40–54)
HDLC SERPL-MCNC: 58 MG/DL (ref 40–60)
HEMOGLOBIN: 18.5 G/DL (ref 13–18)
IMMATURE GRANULOCYTES #: 0 K/UL
IMMATURE GRANULOCYTES %: 0.2 % (ref 0–5)
LDL CHOLESTEROL CALCULATED: 81 MG/DL
LYMPHOCYTES ABSOLUTE: 1.2 K/UL (ref 1.5–4)
LYMPHOCYTES RELATIVE PERCENT: 26.1 %
MCH RBC QN AUTO: 33.3 PG (ref 27–32)
MCHC RBC AUTO-ENTMCNC: 34.3 G/DL (ref 31–35)
MCV RBC AUTO: 97.1 FL (ref 80–100)
MONOCYTES ABSOLUTE: 0.4 K/UL (ref 0.2–0.8)
MONOCYTES RELATIVE PERCENT: 8.4 %
NEUTROPHILS ABSOLUTE: 3 K/UL (ref 2–7.5)
NEUTROPHILS RELATIVE PERCENT: 63.1 %
PDW BLD-RTO: 13.7 % (ref 11–16)
PLATELET # BLD: 194 K/UL (ref 150–400)
PMV BLD AUTO: 8.8 FL (ref 6–10)
POTASSIUM SERPL-SCNC: 4.7 MMOL/L (ref 3.4–5.1)
RBC # BLD: 5.55 M/UL (ref 4.5–6)
SODIUM BLD-SCNC: 141 MMOL/L (ref 136–145)
TOTAL PROTEIN: 6.4 G/DL (ref 6.4–8.3)
TRIGL SERPL-MCNC: 77 MG/DL (ref 0–249)
VLDLC SERPL CALC-MCNC: 15 MG/DL
WBC # BLD: 4.7 K/UL (ref 4–11)

## 2019-07-17 PROCEDURE — 83036 HEMOGLOBIN GLYCOSYLATED A1C: CPT

## 2019-07-17 PROCEDURE — 80061 LIPID PANEL: CPT

## 2019-07-17 PROCEDURE — G8598 ASA/ANTIPLAT THER USED: HCPCS | Performed by: NURSE PRACTITIONER

## 2019-07-17 PROCEDURE — 4040F PNEUMOC VAC/ADMIN/RCVD: CPT | Performed by: NURSE PRACTITIONER

## 2019-07-17 PROCEDURE — G8427 DOCREV CUR MEDS BY ELIG CLIN: HCPCS | Performed by: NURSE PRACTITIONER

## 2019-07-17 PROCEDURE — 99213 OFFICE O/P EST LOW 20 MIN: CPT | Performed by: NURSE PRACTITIONER

## 2019-07-17 PROCEDURE — 1036F TOBACCO NON-USER: CPT | Performed by: NURSE PRACTITIONER

## 2019-07-17 PROCEDURE — G8420 CALC BMI NORM PARAMETERS: HCPCS | Performed by: NURSE PRACTITIONER

## 2019-07-17 PROCEDURE — 80053 COMPREHEN METABOLIC PANEL: CPT

## 2019-07-17 PROCEDURE — 3023F SPIROM DOC REV: CPT | Performed by: NURSE PRACTITIONER

## 2019-07-17 PROCEDURE — G8926 SPIRO NO PERF OR DOC: HCPCS | Performed by: NURSE PRACTITIONER

## 2019-07-17 PROCEDURE — 85025 COMPLETE CBC W/AUTO DIFF WBC: CPT

## 2019-07-17 PROCEDURE — 3017F COLORECTAL CA SCREEN DOC REV: CPT | Performed by: NURSE PRACTITIONER

## 2019-07-17 PROCEDURE — 1123F ACP DISCUSS/DSCN MKR DOCD: CPT | Performed by: NURSE PRACTITIONER

## 2019-07-17 RX ORDER — PREDNISONE 10 MG/1
10 TABLET ORAL DAILY
Qty: 42 TABLET | Refills: 0 | Status: SHIPPED | OUTPATIENT
Start: 2019-07-17 | End: 2019-09-11 | Stop reason: ALTCHOICE

## 2019-07-17 RX ORDER — AZITHROMYCIN 250 MG/1
TABLET, FILM COATED ORAL
Qty: 6 TABLET | Refills: 0 | Status: SHIPPED | OUTPATIENT
Start: 2019-07-17 | End: 2019-09-11 | Stop reason: SDUPTHER

## 2019-07-17 ASSESSMENT — ENCOUNTER SYMPTOMS
ABDOMINAL PAIN: 0
COUGH: 0
NAUSEA: 0
EYE PAIN: 0
SORE THROAT: 0
VOMITING: 0

## 2019-08-01 DIAGNOSIS — F41.9 ANXIETY: ICD-10-CM

## 2019-08-01 RX ORDER — ALPRAZOLAM 1 MG/1
TABLET ORAL
Qty: 60 TABLET | Refills: 0 | Status: SHIPPED | OUTPATIENT
Start: 2019-08-01 | End: 2019-08-27 | Stop reason: SDUPTHER

## 2019-08-01 RX ORDER — FLUTICASONE PROPIONATE 50 MCG
SPRAY, SUSPENSION (ML) NASAL
Qty: 16 G | Refills: 5 | Status: SHIPPED | OUTPATIENT
Start: 2019-08-01 | End: 2020-03-18

## 2019-08-05 RX ORDER — TICAGRELOR 90 MG/1
90 TABLET ORAL 2 TIMES DAILY
Qty: 180 TABLET | Refills: 1 | Status: SHIPPED | OUTPATIENT
Start: 2019-08-05

## 2019-08-10 ASSESSMENT — ENCOUNTER SYMPTOMS
SHORTNESS OF BREATH: 1
WHEEZING: 1

## 2019-08-11 NOTE — PROGRESS NOTES
I have recommended that this patient have a immunization for pneumonia but he declines at this time. I have discussed the risks and benefits of this examination with him. The patient verbalizes understanding.
tenderness. Musculoskeletal: He exhibits no edema. Lymphadenopathy:     He has no cervical adenopathy. Neurological: He is alert and oriented to person, place, and time. Skin: Skin is warm and dry. Psychiatric: He has a normal mood and affect. Vitals reviewed.       Results in Past 30 Days  Result Component Current Result Ref Range Previous Result Ref Range   Alb 4.5 (7/17/2019) 3.4 - 4.8 g/dL Not in Time Range    Albumin/Globulin Ratio 2.4 (H) (7/17/2019) 0.8 - 2.0 Not in Time Range    Alkaline Phosphatase 70 (7/17/2019) 25 - 100 U/L Not in Time Range    ALT 26 (7/17/2019) 4 - 36 U/L Not in Time Range    AST 19 (7/17/2019) 8 - 33 U/L Not in Time Range    BUN 9 (7/17/2019) 6 - 20 mg/dL Not in Time Range    Calcium 9.3 (7/17/2019) 8.5 - 10.5 mg/dL Not in Time Range    Chloride 103 (7/17/2019) 98 - 107 mmol/L Not in Time Range    CO2 26 (7/17/2019) 20 - 30 mmol/L Not in Time Range    CREATININE 0.9 (7/17/2019) 0.4 - 1.2 mg/dL Not in Time Range    GFR  >59 (7/17/2019) >59 Not in Time Range    GFR Non- >59 (7/17/2019) >59 Not in Time Range    Globulin 1.9 (7/17/2019) g/dL Not in Time Range    Glucose 123 (H) (7/17/2019) 74 - 106 mg/dL Not in Time Range    Potassium 4.7 (7/17/2019) 3.4 - 5.1 mmol/L Not in Time Range    Sodium 141 (7/17/2019) 136 - 145 mmol/L Not in Time Range    Total Bilirubin 0.4 (7/17/2019) 0.3 - 1.2 mg/dL Not in Time Range    Total Protein 6.4 (7/17/2019) 6.4 - 8.3 g/dL Not in Time Range        Hemoglobin A1C (%)   Date Value   07/17/2019 6.2 (H)     Microalbumin, Random Urine (mg/dL)   Date Value   11/17/2017 <1.20     LDL Calculated (mg/dL)   Date Value   07/17/2019 81         Lab Results   Component Value Date    WBC 4.7 07/17/2019    NEUTROABS 3.0 07/17/2019    HGB 18.5 (H) 07/17/2019    HCT 53.9 07/17/2019    MCV 97.1 07/17/2019     07/17/2019       Lab Results   Component Value Date    TSH 2.74 08/02/2016       Prior to Visit Medications

## 2019-08-27 DIAGNOSIS — F41.9 ANXIETY: ICD-10-CM

## 2019-08-28 RX ORDER — ALPRAZOLAM 1 MG/1
TABLET ORAL
Qty: 60 TABLET | Refills: 0 | Status: SHIPPED | OUTPATIENT
Start: 2019-08-28 | End: 2019-09-25 | Stop reason: SDUPTHER

## 2019-09-11 ENCOUNTER — OFFICE VISIT (OUTPATIENT)
Dept: PRIMARY CARE CLINIC | Age: 69
End: 2019-09-11
Payer: MEDICARE

## 2019-09-11 VITALS
HEART RATE: 94 BPM | OXYGEN SATURATION: 97 % | BODY MASS INDEX: 20.4 KG/M2 | DIASTOLIC BLOOD PRESSURE: 60 MMHG | HEIGHT: 67 IN | SYSTOLIC BLOOD PRESSURE: 120 MMHG | WEIGHT: 130 LBS

## 2019-09-11 DIAGNOSIS — I77.9 CAROTID ARTERY DISEASE, UNSPECIFIED LATERALITY, UNSPECIFIED TYPE (HCC): ICD-10-CM

## 2019-09-11 DIAGNOSIS — Z00.00 INITIAL MEDICARE ANNUAL WELLNESS VISIT: ICD-10-CM

## 2019-09-11 DIAGNOSIS — Z00.00 ROUTINE GENERAL MEDICAL EXAMINATION AT A HEALTH CARE FACILITY: Primary | ICD-10-CM

## 2019-09-11 DIAGNOSIS — E78.5 HYPERLIPIDEMIA, UNSPECIFIED HYPERLIPIDEMIA TYPE: ICD-10-CM

## 2019-09-11 DIAGNOSIS — F41.9 ANXIETY: ICD-10-CM

## 2019-09-11 DIAGNOSIS — E11.9 TYPE 2 DIABETES MELLITUS WITHOUT COMPLICATION, WITHOUT LONG-TERM CURRENT USE OF INSULIN (HCC): ICD-10-CM

## 2019-09-11 DIAGNOSIS — J44.1 COPD EXACERBATION (HCC): ICD-10-CM

## 2019-09-11 PROCEDURE — 4040F PNEUMOC VAC/ADMIN/RCVD: CPT | Performed by: NURSE PRACTITIONER

## 2019-09-11 PROCEDURE — 1123F ACP DISCUSS/DSCN MKR DOCD: CPT | Performed by: NURSE PRACTITIONER

## 2019-09-11 PROCEDURE — 90715 TDAP VACCINE 7 YRS/> IM: CPT | Performed by: NURSE PRACTITIONER

## 2019-09-11 PROCEDURE — G8598 ASA/ANTIPLAT THER USED: HCPCS | Performed by: NURSE PRACTITIONER

## 2019-09-11 PROCEDURE — 3017F COLORECTAL CA SCREEN DOC REV: CPT | Performed by: NURSE PRACTITIONER

## 2019-09-11 PROCEDURE — 90471 IMMUNIZATION ADMIN: CPT | Performed by: NURSE PRACTITIONER

## 2019-09-11 PROCEDURE — G0438 PPPS, INITIAL VISIT: HCPCS | Performed by: NURSE PRACTITIONER

## 2019-09-11 PROCEDURE — 3044F HG A1C LEVEL LT 7.0%: CPT | Performed by: NURSE PRACTITIONER

## 2019-09-11 RX ORDER — AZITHROMYCIN 250 MG/1
TABLET, FILM COATED ORAL
Qty: 6 TABLET | Refills: 0 | Status: SHIPPED | OUTPATIENT
Start: 2019-09-11 | End: 2019-12-12 | Stop reason: SDUPTHER

## 2019-09-11 ASSESSMENT — ENCOUNTER SYMPTOMS
EYE PAIN: 0
SHORTNESS OF BREATH: 0
VOMITING: 0
SINUS PRESSURE: 1
SORE THROAT: 0
ABDOMINAL PAIN: 0
NAUSEA: 0
COUGH: 0

## 2019-09-11 ASSESSMENT — LIFESTYLE VARIABLES: HOW OFTEN DO YOU HAVE A DRINK CONTAINING ALCOHOL: 0

## 2019-09-11 ASSESSMENT — PATIENT HEALTH QUESTIONNAIRE - PHQ9
SUM OF ALL RESPONSES TO PHQ QUESTIONS 1-9: 1
SUM OF ALL RESPONSES TO PHQ QUESTIONS 1-9: 1

## 2019-09-23 NOTE — PROGRESS NOTES
I have recommended that this patient have a immunization for pneumonia but he declines at this time. I have discussed the risks and benefits of this examination with him. The patient verbalizes understanding.       Immunizations Administered     Name Date Dose Route    Tdap (Boostrix, Adacel) 9/11/2019 0.5 mL Intramuscular    Site: Deltoid- Left    Lot: [id number]    NDC: 75682-056-05
Pneumococcal 65+ years Vaccine (2 of 2 - PCV13) 10/26/2016    Diabetic microalbuminuria test  11/17/2018    Annual Wellness Visit (AWV)  05/29/2019    Flu vaccine (1) 09/01/2019    A1C test (Diabetic or Prediabetic)  07/17/2020    Lipid screen  07/17/2020    Colon cancer screen colonoscopy  09/01/2026    DTaP/Tdap/Td vaccine (2 - Td) 09/11/2029     Recommendations for Preventive Services Due: see orders and patient instructions/AVS.  . Recommended screening schedule for the next 5-10 years is provided to the patient in written form: see Patient Instructions/AVS.    Marva Ruiz was seen today for medicare awv. Diagnoses and all orders for this visit:    Routine general medical examination at a health care facility    Initial Medicare annual wellness visit    Anxiety    Type 2 diabetes mellitus without complication, without long-term current use of insulin ([address]    Hyperlipidemia, unspecified hyperlipidemia type    COPD exacerbation ([facility] [address]    Other orders  -     azithromycin (ZITHROMAX Z-STEPHENIE) 250 MG tablet; As directed  -     Tdap (age 6y and older) IM (BOOSTRIX)        F/U 3 months.

## 2019-09-25 DIAGNOSIS — F41.9 ANXIETY: ICD-10-CM

## 2019-09-26 RX ORDER — ALPRAZOLAM 1 MG/1
TABLET ORAL
Qty: 60 TABLET | Refills: 0 | Status: SHIPPED | OUTPATIENT
Start: 2019-09-26 | End: 2019-10-29 | Stop reason: SDUPTHER

## 2019-10-01 RX ORDER — TAMSULOSIN HYDROCHLORIDE 0.4 MG/1
CAPSULE ORAL
Qty: 30 CAPSULE | Refills: 5 | Status: SHIPPED | OUTPATIENT
Start: 2019-10-01 | End: 2020-03-12 | Stop reason: SDUPTHER

## 2019-10-11 RX ORDER — TRAZODONE HYDROCHLORIDE 100 MG/1
TABLET ORAL
Qty: 60 TABLET | Refills: 0 | Status: SHIPPED | OUTPATIENT
Start: 2019-10-11 | End: 2020-05-05

## 2019-10-26 DIAGNOSIS — F41.9 ANXIETY: ICD-10-CM

## 2019-10-29 DIAGNOSIS — F41.9 ANXIETY: ICD-10-CM

## 2019-10-29 RX ORDER — ALPRAZOLAM 1 MG/1
TABLET ORAL
Qty: 60 TABLET | Refills: 0 | Status: SHIPPED | OUTPATIENT
Start: 2019-10-29 | End: 2019-11-29

## 2019-10-29 RX ORDER — ALPRAZOLAM 1 MG/1
TABLET ORAL
Qty: 60 TABLET | Refills: 0 | Status: SHIPPED | OUTPATIENT
Start: 2019-10-29 | End: 2019-12-12 | Stop reason: SDUPTHER

## 2019-11-13 RX ORDER — TRAZODONE HYDROCHLORIDE 100 MG/1
TABLET ORAL
Qty: 60 TABLET | Refills: 5 | Status: SHIPPED | OUTPATIENT
Start: 2019-11-13 | End: 2019-12-12 | Stop reason: SDUPTHER

## 2019-11-14 ENCOUNTER — OFFICE VISIT (OUTPATIENT)
Dept: CARDIOLOGY | Facility: CLINIC | Age: 69
End: 2019-11-14

## 2019-11-14 VITALS
HEIGHT: 67 IN | BODY MASS INDEX: 21.19 KG/M2 | OXYGEN SATURATION: 98 % | SYSTOLIC BLOOD PRESSURE: 128 MMHG | WEIGHT: 135 LBS | HEART RATE: 68 BPM | DIASTOLIC BLOOD PRESSURE: 64 MMHG

## 2019-11-14 DIAGNOSIS — E78.00 HYPERCHOLESTEROLEMIA: ICD-10-CM

## 2019-11-14 DIAGNOSIS — I25.10 CORONARY ARTERY DISEASE INVOLVING NATIVE CORONARY ARTERY OF NATIVE HEART WITHOUT ANGINA PECTORIS: Primary | ICD-10-CM

## 2019-11-14 DIAGNOSIS — I65.23 BILATERAL CAROTID ARTERY STENOSIS: ICD-10-CM

## 2019-11-14 PROCEDURE — 99214 OFFICE O/P EST MOD 30 MIN: CPT | Performed by: INTERNAL MEDICINE

## 2019-11-20 RX ORDER — GLUCOSAMINE HCL/CHONDROITIN SU 500-400 MG
CAPSULE ORAL
Qty: 50 STRIP | Refills: 5 | Status: SHIPPED | OUTPATIENT
Start: 2019-11-20

## 2019-12-12 ENCOUNTER — OFFICE VISIT (OUTPATIENT)
Dept: PRIMARY CARE CLINIC | Age: 69
End: 2019-12-12
Payer: MEDICARE

## 2019-12-12 ENCOUNTER — HOSPITAL ENCOUNTER (OUTPATIENT)
Facility: HOSPITAL | Age: 69
Discharge: HOME | End: 2019-12-12
Payer: MEDICARE

## 2019-12-12 VITALS
WEIGHT: 132 LBS | SYSTOLIC BLOOD PRESSURE: 110 MMHG | BODY MASS INDEX: 20.67 KG/M2 | DIASTOLIC BLOOD PRESSURE: 70 MMHG | OXYGEN SATURATION: 98 % | HEART RATE: 80 BPM

## 2019-12-12 DIAGNOSIS — J44.1 COPD EXACERBATION (HCC): ICD-10-CM

## 2019-12-12 DIAGNOSIS — E78.5 HYPERLIPIDEMIA, UNSPECIFIED HYPERLIPIDEMIA TYPE: ICD-10-CM

## 2019-12-12 DIAGNOSIS — F41.9 ANXIETY: ICD-10-CM

## 2019-12-12 DIAGNOSIS — E11.9 TYPE 2 DIABETES MELLITUS WITHOUT COMPLICATION, WITHOUT LONG-TERM CURRENT USE OF INSULIN (HCC): Primary | ICD-10-CM

## 2019-12-12 DIAGNOSIS — E11.9 TYPE 2 DIABETES MELLITUS WITHOUT COMPLICATION, WITHOUT LONG-TERM CURRENT USE OF INSULIN (HCC): ICD-10-CM

## 2019-12-12 DIAGNOSIS — I25.119 CORONARY ARTERY DISEASE INVOLVING NATIVE HEART WITH ANGINA PECTORIS, UNSPECIFIED VESSEL OR LESION TYPE (HCC): ICD-10-CM

## 2019-12-12 LAB
A/G RATIO: 2 (ref 0.8–2)
ALBUMIN SERPL-MCNC: 4.5 G/DL (ref 3.4–4.8)
ALP BLD-CCNC: 64 U/L (ref 25–100)
ALT SERPL-CCNC: 27 U/L (ref 4–36)
ANION GAP SERPL CALCULATED.3IONS-SCNC: 9 MMOL/L (ref 3–16)
AST SERPL-CCNC: 18 U/L (ref 8–33)
BASOPHILS ABSOLUTE: 0.1 K/UL (ref 0–0.1)
BASOPHILS RELATIVE PERCENT: 0.9 %
BILIRUB SERPL-MCNC: 0.4 MG/DL (ref 0.3–1.2)
BUN BLDV-MCNC: 15 MG/DL (ref 6–20)
CALCIUM SERPL-MCNC: 9.1 MG/DL (ref 8.5–10.5)
CHLORIDE BLD-SCNC: 101 MMOL/L (ref 98–107)
CHOLESTEROL, TOTAL: 174 MG/DL (ref 0–200)
CO2: 31 MMOL/L (ref 20–30)
CREAT SERPL-MCNC: 0.9 MG/DL (ref 0.4–1.2)
EOSINOPHILS ABSOLUTE: 0.1 K/UL (ref 0–0.4)
EOSINOPHILS RELATIVE PERCENT: 1.7 %
GFR AFRICAN AMERICAN: >59
GFR NON-AFRICAN AMERICAN: >59
GLOBULIN: 2.2 G/DL
GLUCOSE BLD-MCNC: 115 MG/DL (ref 74–106)
HBA1C MFR BLD: 6.1 %
HCT VFR BLD CALC: 52.7 % (ref 40–54)
HDLC SERPL-MCNC: 52 MG/DL (ref 40–60)
HEMOGLOBIN: 17.4 G/DL (ref 13–18)
IMMATURE GRANULOCYTES #: 0 K/UL
IMMATURE GRANULOCYTES %: 0.2 % (ref 0–5)
LDL CHOLESTEROL CALCULATED: 109 MG/DL
LYMPHOCYTES ABSOLUTE: 1.2 K/UL (ref 1.5–4)
LYMPHOCYTES RELATIVE PERCENT: 23 %
MCH RBC QN AUTO: 33 PG (ref 27–32)
MCHC RBC AUTO-ENTMCNC: 33 G/DL (ref 31–35)
MCV RBC AUTO: 99.8 FL (ref 80–100)
MONOCYTES ABSOLUTE: 0.3 K/UL (ref 0.2–0.8)
MONOCYTES RELATIVE PERCENT: 6.4 %
NEUTROPHILS ABSOLUTE: 3.6 K/UL (ref 2–7.5)
NEUTROPHILS RELATIVE PERCENT: 67.8 %
PDW BLD-RTO: 13.3 % (ref 11–16)
PLATELET # BLD: 162 K/UL (ref 150–400)
PMV BLD AUTO: 8.8 FL (ref 6–10)
POTASSIUM SERPL-SCNC: 4.4 MMOL/L (ref 3.4–5.1)
RBC # BLD: 5.28 M/UL (ref 4.5–6)
SODIUM BLD-SCNC: 141 MMOL/L (ref 136–145)
TOTAL PROTEIN: 6.7 G/DL (ref 6.4–8.3)
TRIGL SERPL-MCNC: 64 MG/DL (ref 0–249)
VLDLC SERPL CALC-MCNC: 13 MG/DL
WBC # BLD: 5.4 K/UL (ref 4–11)

## 2019-12-12 PROCEDURE — 3023F SPIROM DOC REV: CPT | Performed by: NURSE PRACTITIONER

## 2019-12-12 PROCEDURE — 3017F COLORECTAL CA SCREEN DOC REV: CPT | Performed by: NURSE PRACTITIONER

## 2019-12-12 PROCEDURE — 2022F DILAT RTA XM EVC RTNOPTHY: CPT | Performed by: NURSE PRACTITIONER

## 2019-12-12 PROCEDURE — 83036 HEMOGLOBIN GLYCOSYLATED A1C: CPT

## 2019-12-12 PROCEDURE — 3044F HG A1C LEVEL LT 7.0%: CPT | Performed by: NURSE PRACTITIONER

## 2019-12-12 PROCEDURE — G8427 DOCREV CUR MEDS BY ELIG CLIN: HCPCS | Performed by: NURSE PRACTITIONER

## 2019-12-12 PROCEDURE — 4040F PNEUMOC VAC/ADMIN/RCVD: CPT | Performed by: NURSE PRACTITIONER

## 2019-12-12 PROCEDURE — 99214 OFFICE O/P EST MOD 30 MIN: CPT | Performed by: NURSE PRACTITIONER

## 2019-12-12 PROCEDURE — G8484 FLU IMMUNIZE NO ADMIN: HCPCS | Performed by: NURSE PRACTITIONER

## 2019-12-12 PROCEDURE — G8598 ASA/ANTIPLAT THER USED: HCPCS | Performed by: NURSE PRACTITIONER

## 2019-12-12 PROCEDURE — 85025 COMPLETE CBC W/AUTO DIFF WBC: CPT

## 2019-12-12 PROCEDURE — 1036F TOBACCO NON-USER: CPT | Performed by: NURSE PRACTITIONER

## 2019-12-12 PROCEDURE — 80061 LIPID PANEL: CPT

## 2019-12-12 PROCEDURE — 80053 COMPREHEN METABOLIC PANEL: CPT

## 2019-12-12 PROCEDURE — G8420 CALC BMI NORM PARAMETERS: HCPCS | Performed by: NURSE PRACTITIONER

## 2019-12-12 PROCEDURE — 1123F ACP DISCUSS/DSCN MKR DOCD: CPT | Performed by: NURSE PRACTITIONER

## 2019-12-12 PROCEDURE — G8926 SPIRO NO PERF OR DOC: HCPCS | Performed by: NURSE PRACTITIONER

## 2019-12-12 RX ORDER — AZITHROMYCIN 250 MG/1
TABLET, FILM COATED ORAL
Qty: 6 TABLET | Refills: 0 | Status: SHIPPED | OUTPATIENT
Start: 2019-12-12 | End: 2019-12-19 | Stop reason: ALTCHOICE

## 2019-12-12 RX ORDER — ALPRAZOLAM 1 MG/1
.5-1 TABLET ORAL 3 TIMES DAILY PRN
Qty: 75 TABLET | Refills: 0 | Status: SHIPPED | OUTPATIENT
Start: 2019-12-12 | End: 2020-01-09 | Stop reason: SDUPTHER

## 2019-12-12 RX ORDER — CITALOPRAM 40 MG/1
40 TABLET ORAL DAILY
Qty: 30 TABLET | Refills: 5 | Status: SHIPPED | OUTPATIENT
Start: 2019-12-12 | End: 2020-05-05

## 2019-12-12 RX ORDER — PREDNISONE 10 MG/1
10 TABLET ORAL DAILY
Qty: 42 TABLET | Refills: 0 | Status: SHIPPED | OUTPATIENT
Start: 2019-12-12 | End: 2019-12-19 | Stop reason: ALTCHOICE

## 2019-12-12 RX ORDER — TRAZODONE HYDROCHLORIDE 100 MG/1
TABLET ORAL
Qty: 60 TABLET | Refills: 5 | Status: SHIPPED | OUTPATIENT
Start: 2019-12-12 | End: 2019-12-19 | Stop reason: SDUPTHER

## 2019-12-12 ASSESSMENT — ENCOUNTER SYMPTOMS
ABDOMINAL PAIN: 0
SHORTNESS OF BREATH: 0
NAUSEA: 0
COUGH: 0
VOMITING: 0
SINUS PRESSURE: 1
EYE PAIN: 0
SORE THROAT: 1

## 2019-12-19 ENCOUNTER — OFFICE VISIT (OUTPATIENT)
Dept: PRIMARY CARE CLINIC | Age: 69
End: 2019-12-19
Payer: MEDICARE

## 2019-12-19 VITALS
WEIGHT: 136 LBS | HEART RATE: 83 BPM | BODY MASS INDEX: 21.35 KG/M2 | SYSTOLIC BLOOD PRESSURE: 100 MMHG | DIASTOLIC BLOOD PRESSURE: 60 MMHG | RESPIRATION RATE: 16 BRPM | HEIGHT: 67 IN | TEMPERATURE: 98.6 F | OXYGEN SATURATION: 96 %

## 2019-12-19 DIAGNOSIS — J02.9 ACUTE PHARYNGITIS, UNSPECIFIED ETIOLOGY: Primary | ICD-10-CM

## 2019-12-19 DIAGNOSIS — R05.9 COUGH: ICD-10-CM

## 2019-12-19 DIAGNOSIS — J20.9 ACUTE BRONCHITIS, UNSPECIFIED ORGANISM: ICD-10-CM

## 2019-12-19 PROCEDURE — 4040F PNEUMOC VAC/ADMIN/RCVD: CPT | Performed by: PEDIATRICS

## 2019-12-19 PROCEDURE — 1123F ACP DISCUSS/DSCN MKR DOCD: CPT | Performed by: PEDIATRICS

## 2019-12-19 PROCEDURE — G8420 CALC BMI NORM PARAMETERS: HCPCS | Performed by: PEDIATRICS

## 2019-12-19 PROCEDURE — 3017F COLORECTAL CA SCREEN DOC REV: CPT | Performed by: PEDIATRICS

## 2019-12-19 PROCEDURE — 1036F TOBACCO NON-USER: CPT | Performed by: PEDIATRICS

## 2019-12-19 PROCEDURE — G8484 FLU IMMUNIZE NO ADMIN: HCPCS | Performed by: PEDIATRICS

## 2019-12-19 PROCEDURE — G8598 ASA/ANTIPLAT THER USED: HCPCS | Performed by: PEDIATRICS

## 2019-12-19 PROCEDURE — 99214 OFFICE O/P EST MOD 30 MIN: CPT | Performed by: PEDIATRICS

## 2019-12-19 PROCEDURE — G8427 DOCREV CUR MEDS BY ELIG CLIN: HCPCS | Performed by: PEDIATRICS

## 2019-12-19 RX ORDER — PREDNISONE 10 MG/1
TABLET ORAL
Qty: 21 TABLET | Refills: 0 | Status: SHIPPED | OUTPATIENT
Start: 2019-12-19 | End: 2020-01-30

## 2019-12-19 RX ORDER — AMOXICILLIN AND CLAVULANATE POTASSIUM 875; 125 MG/1; MG/1
1 TABLET, FILM COATED ORAL EVERY 12 HOURS
Qty: 20 TABLET | Refills: 0 | Status: SHIPPED | OUTPATIENT
Start: 2019-12-19 | End: 2019-12-29

## 2019-12-19 RX ORDER — GUAIFENESIN AND CODEINE PHOSPHATE 100; 10 MG/5ML; MG/5ML
5 SOLUTION ORAL 2 TIMES DAILY PRN
Qty: 60 ML | Refills: 0 | Status: SHIPPED | OUTPATIENT
Start: 2019-12-19 | End: 2020-01-09 | Stop reason: SDUPTHER

## 2019-12-19 ASSESSMENT — ENCOUNTER SYMPTOMS
EYE DISCHARGE: 0
HEMOPTYSIS: 1
BACK PAIN: 0
WHEEZING: 1
SORE THROAT: 1
ABDOMINAL PAIN: 0
COUGH: 1
VOMITING: 0
SINUS PRESSURE: 0
NAUSEA: 0
SHORTNESS OF BREATH: 1

## 2020-01-09 ENCOUNTER — OFFICE VISIT (OUTPATIENT)
Dept: PRIMARY CARE CLINIC | Age: 70
End: 2020-01-09
Payer: MEDICARE

## 2020-01-09 VITALS
DIASTOLIC BLOOD PRESSURE: 60 MMHG | HEART RATE: 90 BPM | SYSTOLIC BLOOD PRESSURE: 122 MMHG | WEIGHT: 137.8 LBS | BODY MASS INDEX: 21.58 KG/M2 | OXYGEN SATURATION: 95 %

## 2020-01-09 PROCEDURE — G8427 DOCREV CUR MEDS BY ELIG CLIN: HCPCS | Performed by: NURSE PRACTITIONER

## 2020-01-09 PROCEDURE — 1036F TOBACCO NON-USER: CPT | Performed by: NURSE PRACTITIONER

## 2020-01-09 PROCEDURE — 1123F ACP DISCUSS/DSCN MKR DOCD: CPT | Performed by: NURSE PRACTITIONER

## 2020-01-09 PROCEDURE — 96372 THER/PROPH/DIAG INJ SC/IM: CPT | Performed by: NURSE PRACTITIONER

## 2020-01-09 PROCEDURE — G8420 CALC BMI NORM PARAMETERS: HCPCS | Performed by: NURSE PRACTITIONER

## 2020-01-09 PROCEDURE — 4040F PNEUMOC VAC/ADMIN/RCVD: CPT | Performed by: NURSE PRACTITIONER

## 2020-01-09 PROCEDURE — 99213 OFFICE O/P EST LOW 20 MIN: CPT | Performed by: NURSE PRACTITIONER

## 2020-01-09 PROCEDURE — G8926 SPIRO NO PERF OR DOC: HCPCS | Performed by: NURSE PRACTITIONER

## 2020-01-09 PROCEDURE — 3017F COLORECTAL CA SCREEN DOC REV: CPT | Performed by: NURSE PRACTITIONER

## 2020-01-09 PROCEDURE — G8484 FLU IMMUNIZE NO ADMIN: HCPCS | Performed by: NURSE PRACTITIONER

## 2020-01-09 PROCEDURE — 3023F SPIROM DOC REV: CPT | Performed by: NURSE PRACTITIONER

## 2020-01-09 RX ORDER — CEFTRIAXONE 1 G/1
1 INJECTION, POWDER, FOR SOLUTION INTRAMUSCULAR; INTRAVENOUS ONCE
Qty: 1 G | Refills: 0
Start: 2020-01-09 | End: 2020-01-09

## 2020-01-09 RX ORDER — LEVOFLOXACIN 500 MG/1
500 TABLET, FILM COATED ORAL DAILY
Qty: 10 TABLET | Refills: 0 | Status: SHIPPED | OUTPATIENT
Start: 2020-01-09 | End: 2020-01-19

## 2020-01-09 RX ORDER — CEFTRIAXONE 1 G/1
1 INJECTION, POWDER, FOR SOLUTION INTRAMUSCULAR; INTRAVENOUS ONCE
Status: COMPLETED | OUTPATIENT
Start: 2020-01-09 | End: 2020-01-09

## 2020-01-09 RX ORDER — PREDNISONE 10 MG/1
10 TABLET ORAL DAILY
Qty: 42 TABLET | Refills: 0 | Status: SHIPPED | OUTPATIENT
Start: 2020-01-09 | End: 2020-01-30

## 2020-01-09 RX ORDER — ALPRAZOLAM 1 MG/1
.5-1 TABLET ORAL 3 TIMES DAILY PRN
Qty: 75 TABLET | Refills: 0 | Status: SHIPPED | OUTPATIENT
Start: 2020-01-09 | End: 2020-02-06

## 2020-01-09 RX ORDER — GUAIFENESIN AND CODEINE PHOSPHATE 100; 10 MG/5ML; MG/5ML
5 SOLUTION ORAL 4 TIMES DAILY PRN
Qty: 120 ML | Refills: 0 | Status: SHIPPED | OUTPATIENT
Start: 2020-01-09 | End: 2020-01-16

## 2020-01-09 RX ORDER — DEXAMETHASONE SODIUM PHOSPHATE 10 MG/ML
10 INJECTION INTRAMUSCULAR; INTRAVENOUS ONCE
Status: COMPLETED | OUTPATIENT
Start: 2020-01-09 | End: 2020-01-09

## 2020-01-09 RX ADMIN — CEFTRIAXONE 1 G: 1 INJECTION, POWDER, FOR SOLUTION INTRAMUSCULAR; INTRAVENOUS at 10:05

## 2020-01-09 RX ADMIN — DEXAMETHASONE SODIUM PHOSPHATE 10 MG: 10 INJECTION INTRAMUSCULAR; INTRAVENOUS at 10:06

## 2020-01-09 ASSESSMENT — ENCOUNTER SYMPTOMS
ABDOMINAL PAIN: 0
VOMITING: 0
SHORTNESS OF BREATH: 0
EYE PAIN: 0
NAUSEA: 0
SORE THROAT: 1
COUGH: 1

## 2020-01-09 ASSESSMENT — PATIENT HEALTH QUESTIONNAIRE - PHQ9: DEPRESSION UNABLE TO ASSESS: FUNCTIONAL CAPACITY MOTIVATION LIMITS ACCURACY

## 2020-01-15 NOTE — PROGRESS NOTES
SUBJECTIVE:    Patient ID: Amber Mosher is a 78 y.o. male. Medicalhistory Review  Past Medical, Family, and Social History reviewed and does contribute to the patient presenting condition    Health Maintenance Due   Topic Date Due    AAA screen  1950    Hepatitis C screen  1950    Diabetic foot exam  12/26/1959    Diabetic retinal exam  12/26/1959    Shingles Vaccine (1 of 2) 12/26/1999    Low dose CT lung screening  12/26/2004    Diabetic microalbuminuria test  11/17/2018    Flu vaccine (1) 09/01/2019       HPI:   Chief Complaint   Patient presents with    Cough     Patient here today for cough and sore throat, He states he has been smothering. He has to sit up on the edge of the bed at night.  Pharyngitis    Shortness of Breath   He has not been taking neb treatments regularly. Patient's medications, allergies, pastmedical, surgical, social and family histories were reviewed and updated as appropriate. Review of Systems Reviewed and acurate. See MA note. OBJECTIVE:    /60   Pulse 90   Wt 137 lb 12.8 oz (62.5 kg)   SpO2 95%   BMI 21.58 kg/m²      Physical Exam  Vitals signs reviewed. Constitutional:       General: He is not in acute distress. Appearance: He is well-developed. HENT:      Head: Normocephalic. Right Ear: Tympanic membrane normal.      Left Ear: Tympanic membrane normal.      Mouth/Throat:      Pharynx: Posterior oropharyngeal erythema present. No oropharyngeal exudate. Eyes:      General: Lids are normal.   Neck:      Musculoskeletal: Neck supple. Cardiovascular:      Rate and Rhythm: Normal rate and regular rhythm. Heart sounds: Normal heart sounds. Pulmonary:      Effort: Pulmonary effort is normal.      Breath sounds: Wheezing present. Abdominal:      General: Bowel sounds are normal. There is no distension. Palpations: Abdomen is soft. Tenderness: There is no tenderness.    Lymphadenopathy: as pain becomes manageable    cefTRIAXone (ROCEPHIN) injection 1 g     No orders of the defined types were placed in this encounter. Patient Instructions   Take 3 or 4 breathing treatments a day until you feel better. Return if symptoms worsen or fail to improve. Keep scheduled f/u.

## 2020-01-30 ENCOUNTER — OFFICE VISIT (OUTPATIENT)
Dept: PRIMARY CARE CLINIC | Age: 70
End: 2020-01-30
Payer: MEDICARE

## 2020-01-30 VITALS
HEART RATE: 82 BPM | TEMPERATURE: 98.6 F | BODY MASS INDEX: 22.58 KG/M2 | DIASTOLIC BLOOD PRESSURE: 74 MMHG | SYSTOLIC BLOOD PRESSURE: 145 MMHG | WEIGHT: 144.2 LBS | OXYGEN SATURATION: 92 %

## 2020-01-30 PROCEDURE — 4040F PNEUMOC VAC/ADMIN/RCVD: CPT | Performed by: NURSE PRACTITIONER

## 2020-01-30 PROCEDURE — G8427 DOCREV CUR MEDS BY ELIG CLIN: HCPCS | Performed by: NURSE PRACTITIONER

## 2020-01-30 PROCEDURE — 99213 OFFICE O/P EST LOW 20 MIN: CPT | Performed by: NURSE PRACTITIONER

## 2020-01-30 PROCEDURE — 3023F SPIROM DOC REV: CPT | Performed by: NURSE PRACTITIONER

## 2020-01-30 PROCEDURE — G8926 SPIRO NO PERF OR DOC: HCPCS | Performed by: NURSE PRACTITIONER

## 2020-01-30 PROCEDURE — G8484 FLU IMMUNIZE NO ADMIN: HCPCS | Performed by: NURSE PRACTITIONER

## 2020-01-30 PROCEDURE — 1123F ACP DISCUSS/DSCN MKR DOCD: CPT | Performed by: NURSE PRACTITIONER

## 2020-01-30 PROCEDURE — 1036F TOBACCO NON-USER: CPT | Performed by: NURSE PRACTITIONER

## 2020-01-30 PROCEDURE — G8420 CALC BMI NORM PARAMETERS: HCPCS | Performed by: NURSE PRACTITIONER

## 2020-01-30 PROCEDURE — 3017F COLORECTAL CA SCREEN DOC REV: CPT | Performed by: NURSE PRACTITIONER

## 2020-01-30 RX ORDER — GUAIFENESIN 600 MG/1
600 TABLET, EXTENDED RELEASE ORAL 2 TIMES DAILY PRN
Qty: 60 TABLET | Refills: 0 | COMMUNITY
Start: 2020-01-30 | End: 2020-10-02

## 2020-01-30 RX ORDER — AMOXICILLIN AND CLAVULANATE POTASSIUM 875; 125 MG/1; MG/1
1 TABLET, FILM COATED ORAL 2 TIMES DAILY WITH MEALS
Qty: 20 TABLET | Refills: 0 | Status: SHIPPED | OUTPATIENT
Start: 2020-01-30 | End: 2020-02-09

## 2020-01-30 ASSESSMENT — ENCOUNTER SYMPTOMS
ABDOMINAL PAIN: 0
SORE THROAT: 0
SHORTNESS OF BREATH: 0
EYE PAIN: 0
NAUSEA: 0
VOMITING: 0
COUGH: 1

## 2020-01-30 ASSESSMENT — PATIENT HEALTH QUESTIONNAIRE - PHQ9: DEPRESSION UNABLE TO ASSESS: URGENT/EMERGENT SITUATION

## 2020-02-04 RX ORDER — PRAVASTATIN SODIUM 40 MG
TABLET ORAL
Qty: 45 TABLET | Refills: 5 | Status: SHIPPED | OUTPATIENT
Start: 2020-02-04 | End: 2020-11-05

## 2020-02-10 NOTE — PROGRESS NOTES
Chief Complaint   Patient presents with    Cough     Patient here today for cough and congestion. He has a productive cough. It is dark brown stuff. He thinks he has fluid in his face.  Chest Congestion       Have you seen any other physician or provider since your last visit? no    Have you had any other diagnostic tests since your last visit? no    Have you changed or stopped any medications since your last visit including any over-the-counter medicines, vitamins, or herbal medicines? no     Are you taking all your prescribed medications? Yes  If NO, why? -  N/A    I have recommended that this patient have a flu shot but he declines at this time. I have discussed the risks and benefits of this examination with him. The patient verbalizes understanding. REVIEW OF SYSTEMS:  Review of Systems   Constitutional: Negative for chills and fever. HENT: Positive for congestion. Negative for ear pain and sore throat. Eyes: Negative for pain and visual disturbance. Respiratory: Positive for cough. Negative for shortness of breath. Cardiovascular: Negative for chest pain, palpitations and leg swelling. Gastrointestinal: Negative for abdominal pain, nausea and vomiting. Genitourinary: Negative for dysuria and hematuria. Musculoskeletal: Negative for joint swelling. Skin: Negative for rash. Neurological: Negative for dizziness and weakness. Psychiatric/Behavioral: Negative for sleep disturbance.
Yes VIKI Vargas   fluticasone Andrea Cabot) 50 MCG/ACT nasal spray USE 2 SPRAYS IN EACH NOSTRIL EVERY DAY Yes VIKI Vargas   gabapentin (NEURONTIN) 800 MG tablet Take 800 mg by mouth 4 times daily. Yes Historical Provider, MD   fluticasone-salmeterol (ADVAIR DISKUS) 250-50 MCG/DOSE AEPB Inhale 1 puff into the lungs 2 times daily Rinse mouth after use. Yes VIKI Vargas   HYDROcodone-acetaminophen (NORCO)  MG per tablet Norco 10 mg-325 mg tablet   Take 1 tablet 4 times a day by oral route as directed for 30 days. Yes Historical Provider, MD   cyclobenzaprine (FLEXERIL) 10 MG tablet Take 10 mg by mouth daily Yes Historical Provider, MD   ALPRAZolam Glennda Cue) 1 MG tablet Take 0.5-1 tablets by mouth 3 times daily as needed for Sleep for up to 30 days. No early refill  VIKI Vargas   pravastatin (PRAVACHOL) 40 MG tablet TAKE 1 AND 1/2 TABLETS BY MOUTH EVERY DAY  VIKI Vargas       ASSESSMENT:  1. Acute sinusitis, recurrence not specified, unspecified location    2. COPD exacerbation ([facility]        PLAN:  Orders Placed This Encounter   Medications    guaiFENesin (MUCINEX) 600 MG extended release tablet     Sig: Take 1 tablet by mouth 2 times daily as needed for Congestion     Dispense:  60 tablet     Refill:  0    amoxicillin-clavulanate (AUGMENTIN) 875-125 MG per tablet     Sig: Take 1 tablet by mouth 2 times daily (with meals) for 10 days     Dispense:  20 tablet     Refill:  0       Patient Instructions   Antibiotics for infection. Mucinex twice a day for chest congestion. F/U next week.

## 2020-02-11 ENCOUNTER — TELEPHONE (OUTPATIENT)
Dept: PRIMARY CARE CLINIC | Age: 70
End: 2020-02-11

## 2020-03-12 ENCOUNTER — OFFICE VISIT (OUTPATIENT)
Dept: PRIMARY CARE CLINIC | Age: 70
End: 2020-03-12
Payer: MEDICARE

## 2020-03-12 VITALS
DIASTOLIC BLOOD PRESSURE: 80 MMHG | OXYGEN SATURATION: 93 % | HEART RATE: 96 BPM | SYSTOLIC BLOOD PRESSURE: 130 MMHG | BODY MASS INDEX: 21.93 KG/M2 | WEIGHT: 140 LBS

## 2020-03-12 PROCEDURE — G8420 CALC BMI NORM PARAMETERS: HCPCS | Performed by: NURSE PRACTITIONER

## 2020-03-12 PROCEDURE — 3017F COLORECTAL CA SCREEN DOC REV: CPT | Performed by: NURSE PRACTITIONER

## 2020-03-12 PROCEDURE — 99213 OFFICE O/P EST LOW 20 MIN: CPT | Performed by: NURSE PRACTITIONER

## 2020-03-12 PROCEDURE — G8484 FLU IMMUNIZE NO ADMIN: HCPCS | Performed by: NURSE PRACTITIONER

## 2020-03-12 PROCEDURE — 4040F PNEUMOC VAC/ADMIN/RCVD: CPT | Performed by: NURSE PRACTITIONER

## 2020-03-12 PROCEDURE — 2022F DILAT RTA XM EVC RTNOPTHY: CPT | Performed by: NURSE PRACTITIONER

## 2020-03-12 PROCEDURE — 1123F ACP DISCUSS/DSCN MKR DOCD: CPT | Performed by: NURSE PRACTITIONER

## 2020-03-12 PROCEDURE — G8427 DOCREV CUR MEDS BY ELIG CLIN: HCPCS | Performed by: NURSE PRACTITIONER

## 2020-03-12 PROCEDURE — 3046F HEMOGLOBIN A1C LEVEL >9.0%: CPT | Performed by: NURSE PRACTITIONER

## 2020-03-12 PROCEDURE — 1036F TOBACCO NON-USER: CPT | Performed by: NURSE PRACTITIONER

## 2020-03-12 RX ORDER — TAMSULOSIN HYDROCHLORIDE 0.4 MG/1
CAPSULE ORAL
Qty: 30 CAPSULE | Refills: 5 | Status: SHIPPED | OUTPATIENT
Start: 2020-03-12 | End: 2020-06-09 | Stop reason: SDUPTHER

## 2020-03-12 RX ORDER — ALPRAZOLAM 1 MG/1
.5-1 TABLET ORAL 3 TIMES DAILY PRN
Qty: 75 TABLET | Refills: 0 | Status: SHIPPED | OUTPATIENT
Start: 2020-03-12 | End: 2020-04-07

## 2020-03-12 ASSESSMENT — ENCOUNTER SYMPTOMS
ABDOMINAL PAIN: 0
SHORTNESS OF BREATH: 0
SORE THROAT: 0
COUGH: 0
VOMITING: 0
NAUSEA: 0
EYE PAIN: 0

## 2020-03-12 ASSESSMENT — PATIENT HEALTH QUESTIONNAIRE - PHQ9
1. LITTLE INTEREST OR PLEASURE IN DOING THINGS: 0
2. FEELING DOWN, DEPRESSED OR HOPELESS: 0
SUM OF ALL RESPONSES TO PHQ QUESTIONS 1-9: 0
SUM OF ALL RESPONSES TO PHQ9 QUESTIONS 1 & 2: 0
SUM OF ALL RESPONSES TO PHQ QUESTIONS 1-9: 0

## 2020-03-12 NOTE — PROGRESS NOTES
SUBJECTIVE:    Patient ID: Jona Sánchez is a 78 y.o. male. Medical History Review  Past Medical, Family, and Social History reviewed and does contribute to the patient presenting condition    Health Maintenance Due   Topic Date Due    AAA screen  1950    Hepatitis C screen  1950    Diabetic foot exam  12/26/1959    Diabetic retinal exam  12/26/1959    Hepatitis B vaccine (1 of 3 - Risk 3-dose series) 12/26/1968    Shingles Vaccine (1 of 2) 12/26/1999    Low dose CT lung screening  12/26/2004    Diabetic microalbuminuria test  11/17/2018    Flu vaccine (1) 09/01/2019       HPI:   Chief Complaint   Patient presents with    Neck Pain     Patient here today for a follow up. He thinks there is something wrong with his ear he wore hearing aids and he thinks they cut his ear. He also thinks the artery in his neck is clogged up more. He has neck and head  pain.  COPD   He has chronic ringing in his ears. He has cut back on his smoking- down to one cigar per day. Patient's medications, allergies, past medical, surgical, social and family histories were reviewed and updated as appropriate. Review of Systems Reviewed and acurate. See MA note. OBJECTIVE:  /80   Pulse 96   Wt 140 lb (63.5 kg)   SpO2 93%   BMI 21.93 kg/m²    Physical Exam  Vitals signs reviewed. Constitutional:       General: He is not in acute distress. Appearance: He is well-developed. HENT:      Head: Normocephalic. Right Ear: Tympanic membrane normal.      Left Ear: Tympanic membrane normal.      Mouth/Throat:      Pharynx: No oropharyngeal exudate. Eyes:      General: Lids are normal.   Neck:      Musculoskeletal: Neck supple. Cardiovascular:      Rate and Rhythm: Normal rate and regular rhythm. Heart sounds: Normal heart sounds. Pulmonary:      Effort: Pulmonary effort is normal.      Breath sounds: Normal breath sounds.    Abdominal:      General: Bowel sounds are normal. There tablet Take 1 tablet by mouth 2 times daily 340 B program Yes VIKI Phan   gabapentin (NEURONTIN) 800 MG tablet Take 800 mg by mouth 4 times daily. Yes Historical Provider, MD   fluticasone-salmeterol (ADVAIR DISKUS) 250-50 MCG/DOSE AEPB Inhale 1 puff into the lungs 2 times daily Rinse mouth after use. Yes VIKI Phan   HYDROcodone-acetaminophen (NORCO)  MG per tablet Norco 10 mg-325 mg tablet   Take 1 tablet 4 times a day by oral route as directed for 30 days. Yes Historical Provider, MD   cyclobenzaprine (FLEXERIL) 10 MG tablet Take 10 mg by mouth daily Yes Historical Provider, MD   fluticasone (FLONASE) 50 MCG/ACT nasal spray USE 2 SPRAYS IN EACH NOSTRIL EVERY DAY  VIKI Phan       ASSESSMENT:  1. Bilateral carotid artery disease, unspecified type ([address]    2. Anxiety    3. Occlusion and stenosis of bilateral carotid arteries     4. Benign prostatic hyperplasia, unspecified whether lower urinary tract symptoms present    5. Hyperlipidemia, unspecified hyperlipidemia type    6. Type 2 diabetes mellitus without complication, without long-term current use of insulin (HCC)        PLAN:  Orders Placed This Encounter   Medications    tamsulosin (FLOMAX) 0.4 MG capsule     Sig: TAKE ONE CAPSULE BY MOUTH AT BEDTIME FOR PROSTATE     Dispense:  30 capsule     Refill:  5    ALPRAZolam (XANAX) 1 MG tablet     Sig: Take 0.5-1 tablets by mouth 3 times daily as needed for Sleep for up to 30 days. No early refill     Dispense:  75 tablet     Refill:  0     Orders Placed This Encounter   Procedures    US CAROTID ARTERY BILATERAL    LIPID PANEL    COMPREHENSIVE METABOLIC PANEL    HEMOGLOBIN A1C    CBC WITH AUTO DIFFERENTIAL     Fasting labs prior to f/u. Return in about 3 months (around 6/12/2020).

## 2020-03-18 RX ORDER — FLUTICASONE PROPIONATE 50 MCG
SPRAY, SUSPENSION (ML) NASAL
Qty: 16 G | Refills: 5 | Status: SHIPPED | OUTPATIENT
Start: 2020-03-18 | End: 2020-11-05

## 2020-04-07 RX ORDER — ALPRAZOLAM 1 MG/1
.5-1 TABLET ORAL 3 TIMES DAILY PRN
Qty: 75 TABLET | Refills: 0 | Status: SHIPPED | OUTPATIENT
Start: 2020-04-07 | End: 2020-05-07

## 2020-04-23 ENCOUNTER — VIRTUAL VISIT (OUTPATIENT)
Dept: PRIMARY CARE CLINIC | Age: 70
End: 2020-04-23
Payer: MEDICARE

## 2020-04-23 PROCEDURE — G2025 DIS SITE TELE SVCS RHC/FQHC: HCPCS | Performed by: NURSE PRACTITIONER

## 2020-04-23 RX ORDER — METHYLPREDNISOLONE 4 MG/1
TABLET ORAL
Qty: 1 KIT | Refills: 0 | Status: SHIPPED | OUTPATIENT
Start: 2020-04-23 | End: 2020-06-09

## 2020-04-23 RX ORDER — AZITHROMYCIN 250 MG/1
TABLET, FILM COATED ORAL
Qty: 6 TABLET | Refills: 0 | Status: SHIPPED | OUTPATIENT
Start: 2020-04-23 | End: 2020-05-03

## 2020-05-05 RX ORDER — CITALOPRAM 40 MG/1
TABLET ORAL
Qty: 30 TABLET | Refills: 5 | Status: SHIPPED | OUTPATIENT
Start: 2020-05-05 | End: 2020-11-02

## 2020-05-05 RX ORDER — TRAZODONE HYDROCHLORIDE 100 MG/1
TABLET ORAL
Qty: 60 TABLET | Refills: 5 | Status: SHIPPED | OUTPATIENT
Start: 2020-05-05 | End: 2020-11-05

## 2020-05-07 RX ORDER — ALPRAZOLAM 1 MG/1
.5-1 TABLET ORAL 3 TIMES DAILY PRN
Qty: 75 TABLET | Refills: 0 | Status: SHIPPED | OUTPATIENT
Start: 2020-05-07 | End: 2020-06-09 | Stop reason: SDUPTHER

## 2020-05-17 ASSESSMENT — ENCOUNTER SYMPTOMS
COUGH: 0
SORE THROAT: 1
EYE PAIN: 0
SINUS PAIN: 1
VOMITING: 0
NAUSEA: 0
WHEEZING: 1
ABDOMINAL PAIN: 0
SHORTNESS OF BREATH: 0

## 2020-05-17 NOTE — PROGRESS NOTES
Kell Yang is a 78 y.o. male evaluated via telephone on 4/23/2020. The visit was conducted with the patient in his/her home and provider in her home. Consent:  He and/or health care decision maker is aware that that he may receive a bill for this telephone service, depending on his insurance coverage, and has provided verbal consent to proceed: Yes    SUBJECTIVE:    Health Maintenance Due   Topic Date Due    AAA screen  1950    Hepatitis C screen  1950    Diabetic foot exam  12/26/1959    Diabetic retinal exam  12/26/1959    Shingles Vaccine (1 of 2) 12/26/1999    Low dose CT lung screening  12/26/2004    Diabetic microalbuminuria test  11/17/2018       HPI:   Chief Complaint   Patient presents with    Sinusitis   He feels like he has a sinus infection. His nose and throat are burning. He is wheezing some in the evenings. Patient's medications, allergies, past medical, surgical, social and family histories were reviewed and updated as appropriate. Review of Systems   Constitutional: Negative for chills and fever. HENT: Positive for congestion, sinus pain and sore throat. Negative for ear pain. Eyes: Negative for pain and visual disturbance. Respiratory: Positive for wheezing. Negative for cough and shortness of breath. Cardiovascular: Negative for chest pain, palpitations and leg swelling. Gastrointestinal: Negative for abdominal pain, nausea and vomiting. Genitourinary: Negative for dysuria and hematuria. Musculoskeletal: Negative for joint swelling. Skin: Negative for rash. Neurological: Negative for dizziness and weakness. Psychiatric/Behavioral: Negative for sleep disturbance. OBJECTIVE:  There were no vitals taken for this visit. Physical Exam  Pulmonary:      Effort: Pulmonary effort is normal.   Neurological:      Mental Status: He is alert and oriented to person, place, and time.          No results found for requested labs within last 30 fluticasone-salmeterol (ADVAIR DISKUS) 250-50 MCG/DOSE AEPB Inhale 1 puff into the lungs 2 times daily Rinse mouth after use. VIKI Herndon   HYDROcodone-acetaminophen (NORCO)  MG per tablet Norco 10 mg-325 mg tablet   Take 1 tablet 4 times a day by oral route as directed for 30 days. Historical Provider, MD   cyclobenzaprine (FLEXERIL) 10 MG tablet Take 10 mg by mouth daily  Historical Provider, MD       ASSESSMENT:  1. COPD exacerbation ([facility]    2. Acute sinusitis, recurrence not specified, unspecified location        PLAN:  Orders Placed This Encounter   Medications    azithromycin (ZITHROMAX Z-STEPHENIE) 250 MG tablet     Sig: As directed Dx:J44.1     Dispense:  6 tablet     Refill:  0    methylPREDNISolone (MEDROL, STEPHENIE,) 4 MG tablet     Sig: Take with food. Dispense:  1 kit     Refill:  0     Rest. Increase fluids. Call if no improvement in a few days. I affirm this is a Patient Initiated Episode with an Established Patient who has not had a related appointment within my department in the past 7 days or scheduled within the next 24 hours.     Total Time: minutes: 5-10 minutes    Note: not billable if this call serves to triage the patient into an appointment for the relevant concern      Apryl Masters

## 2020-05-19 ENCOUNTER — OFFICE VISIT (OUTPATIENT)
Dept: CARDIOLOGY | Facility: CLINIC | Age: 70
End: 2020-05-19

## 2020-05-19 VITALS
WEIGHT: 124 LBS | SYSTOLIC BLOOD PRESSURE: 113 MMHG | HEIGHT: 67 IN | DIASTOLIC BLOOD PRESSURE: 65 MMHG | HEART RATE: 95 BPM | BODY MASS INDEX: 19.46 KG/M2

## 2020-05-19 DIAGNOSIS — I25.10 CORONARY ARTERY DISEASE INVOLVING NATIVE CORONARY ARTERY OF NATIVE HEART WITHOUT ANGINA PECTORIS: Primary | ICD-10-CM

## 2020-05-19 DIAGNOSIS — E78.00 HYPERCHOLESTEROLEMIA: ICD-10-CM

## 2020-05-19 PROCEDURE — 99442 PR PHYS/QHP TELEPHONE EVALUATION 11-20 MIN: CPT | Performed by: INTERNAL MEDICINE

## 2020-05-19 RX ORDER — NITROGLYCERIN 0.4 MG/1
TABLET SUBLINGUAL
Qty: 25 TABLET | Refills: 1 | Status: SHIPPED | OUTPATIENT
Start: 2020-05-19 | End: 2020-07-07

## 2020-05-19 NOTE — PROGRESS NOTES
[facility]  Office visit  Geronimo Pizano  1950  [phone]    VISIT DATE:  5/19/2020    This patient has consented to a telehealth visit via telephone. The visit was scheduled as a routine visit to comply with patient safety concerns in accordance with CDC recommendations.  All vitals recorded within this visit are reported by the patient.  I spent 15 minutes in total including but not limited to the 11 minutes spent in direct conversation with this patient.       PCP: Johana Garcia APRN  [address] [facility] [id number]    CC:  Chief Complaint   Patient presents with   • Chest Pain   • Arm Pain   • Coronary Artery Disease       PROBLEM LIST:  1. Coronary artery disease:  a. Remote bare-metal stent, April 1999.  b. Recurrent stenting of left anterior descending coronary artery in 2003.  c. Cardiac catheterization in August 2007, revealing normal EF with 25% in-stent stenosis in the left anterior descending coronary artery and 50% stenosis in the first obtuse marginal branch of the circumflex.  d. Left heart catheterization, 06/16/2009: 25% in-stent restenosis of the LAD, normal EF.  e. Normal nuclear perfusion study, 03/19/2015, Nora Guzmán MD, revealing no evidence of reducible ischemia, normal LV systolic function and wall motion.   f. Echo 10/2018       -Ejection fraction is visually estimated to be 60-65 %.        -Mild concentric left ventricular hypertrophy is present.        -Diastolic filling parameters suggests grade I diastolic dysfunction .        -Mild-to-moderate aortic regurgitation is present.      G. cardiac catheterization January 18, 2019  · 90% proximal LAD stenosis treated with 3.0 x 12 mm Debbei drug-eluting stent  · Patent proximal and mid LAD stents.  · 90% distal LAD stenosis  · 30-40% distal left main stenosis  · Elevated LVEDP  · No aortic stenosis  2. Aortic insufficiency, mild-moderate  3. Carotid artery  disease:  a. Asymptomatic.  b. Carotid duplex, 08/11/2012: 40% to 60% stenosis of the left ICA, less than 40% of the right ICA.  c. Carotid ultrasound, 10/04/2013: No change from previous exams, with 30% stenosis on the right carotid artery and 50% on the left.  d. March 2018:1. Right internal carotid artery estimated diameter reduction:  20-49 %.  2. Left internal carotid artery estimated diameter reduction:  20-49 %. However, the ICA:ECA ratio is elevated suggesting that  this may represent a 50-69% stenosis similar to that seen on the  study of 12/27/2016.  4. Palpitations: Holter January 2018-frequent premature atrial contractions and premature ventricular contractions.  5. Hypercholesterolemia.  6. Chronic back pain.  7. Recent smoking cessation.  8. Mild anxiety.  9. Acid reflux.  10. Chronic obstructive pulmonary disease.  11. Surgical history: Remote appendectomy.    ASSESSMENT:   Diagnosis Plan   1. Coronary artery disease involving native coronary artery of native heart without angina pectoris     2. Hypercholesterolemia         PLAN:  Coronary artery disease: Now with atypical angina.  Discussed repeat noninvasive evaluation with Mary scan myocardial perfusion imaging which he refused at this time.  Previously intolerant to high intensity statin therapy.  Continue current medical therapy, given prescription for sublingual nitroglycerin.  Will proceed with more definitive evaluation of coronary anatomy if atypical anginal symptoms increase in frequency, severity or duration.    Hypercholesterolemia: Goal LDL less than 70.  Continue statin.  Previously intolerant to higher intensity statin therapy.    Peripheral vascular disease: Mild bilateral carotid disease.  Repeat carotid ultrasound imaging pending.    Nicotine addiction: Counseled on need for smoking cessation.    Subjective  Still smoking about 3-4 cigars/day.  Blood pressures running less than 130/80 mmHg.  Stop Brilinta in January.  Denies  "palpitations.  No myalgias on statin therapy.  Reports intermittent precordial chest discomfort which is sharp in nature.  Intermittent episodes in which his arms feel like to have an ache.  No obvious triggers.  Usually at rest.  Last for an hour.  No alleviating or exacerbating factors.  Unclear whether these are similar to his previous anginal equivalent.    PHYSICAL EXAMINATION:  Vitals:    05/19/20 0950   BP: 113/65   BP Location: Left arm   Patient Position: Sitting   Pulse: 95   Weight: 56.2 kg (124 lb)   Height: 170.2 cm (67\")         Diagnostic Data:  Procedures  Lab Results   Component Value Date    CHLPL 174 12/12/2019    TRIG 64 12/12/2019    HDL 52 12/12/2019     Lab Results   Component Value Date    GLUCOSE 127 (H) 01/18/2019    BUN 7 (L) 01/18/2019    CREATININE 0.81 01/18/2019     01/18/2019    K 4.3 01/18/2019     01/18/2019    CO2 29.0 01/18/2019     Lab Results   Component Value Date    HGBA1C 6.1 (H) 12/12/2019     Lab Results   Component Value Date    WBC 5.4 12/12/2019    HGB 17.4 12/12/2019    HCT 52.7 12/12/2019     12/12/2019       Allergies  Allergies   Allergen Reactions   • Metoprolol Shortness Of Breath   • Cetirizine      Insomnia   • Fluoxetine      Dizziness and stomach pain   • Plavix [Clopidogrel Bisulfate] Other (See Comments)     Dyspepsia     • Promethazine      itching   • Rosuvastatin Other (See Comments)     Myalgias   • Sulfa Antibiotics      Oral blisters, diarrhea   • Varenicline Swelling     Throat swelling   • Penicillins Rash       Current Medications    Current Outpatient Medications:   •  albuterol (PROVENTIL HFA;VENTOLIN HFA) 108 (90 Base) MCG/ACT inhaler, Inhale 2 puffs Every 4 (Four) Hours As Needed for Wheezing., Disp: , Rfl:   •  ALPRAZolam (XANAX) 1 MG tablet, Take 1 mg by mouth 2 (Two) Times a Day As Needed for Anxiety., Disp: , Rfl:   •  aspirin  MG tablet, Take 325 mg by mouth Daily., Disp: , Rfl:   •  citalopram (CeleXA) 40 MG tablet, " Take 40 mg by mouth Daily., Disp: , Rfl: 5  •  cyclobenzaprine (FLEXERIL) 10 MG tablet, Take 10 mg by mouth Daily., Disp: , Rfl:   •  fluticasone (FLONASE) 50 MCG/ACT nasal spray, 2 sprays into the nostril(s) as directed by provider As Needed., Disp: , Rfl:   •  gabapentin (NEURONTIN) 600 MG tablet, Take 600 mg by mouth 4 (Four) Times a Day., Disp: , Rfl:   •  HYDROcodone-acetaminophen (NORCO)  MG per tablet, Take 1 tablet by mouth 4 (Four) Times a Day As Needed for Moderate Pain ., Disp: , Rfl:   •  nitroglycerin (NITROSTAT) 0.4 MG SL tablet, 1 under the tongue as needed for angina, may repeat q5mins for up three doses, Disp: 25 tablet, Rfl: 1  •  pravastatin (PRAVACHOL) 40 MG tablet, Take 40 mg by mouth Daily., Disp: , Rfl:   •  tamsulosin (FLOMAX) 0.4 MG capsule 24 hr capsule, Take 1 capsule by mouth As Needed., Disp: , Rfl:   •  ticagrelor (BRILINTA) 90 MG tablet tablet, Take 1 tablet by mouth 2 (Two) Times a Day., Disp: 60 tablet, Rfl: 11  •  traZODone (DESYREL) 100 MG tablet, Take 100 mg by mouth Every Night. 2 tabs at night, Disp: , Rfl:           ROS  Review of Systems   Cardiovascular: Positive for chest pain, dyspnea on exertion and irregular heartbeat. Negative for leg swelling.   Respiratory: Positive for cough, shortness of breath and snoring. Negative for wheezing.    Neurological: Positive for dizziness.          SOCIAL HX  Social History     Socioeconomic History   • Marital status:      Spouse name: Not on file   • Number of children: Not on file   • Years of education: Not on file   • Highest education level: Not on file   Tobacco Use   • Smoking status: Current Every Day Smoker     Packs/day: 0.00     Types: Cigars   • Smokeless tobacco: Never Used   • Tobacco comment: 3 cigars daily   Substance and Sexual Activity   • Alcohol use: No   • Drug use: No   • Sexual activity: Defer       FAMILY HX  Family History   Problem Relation Age of Onset   • Diabetes Father    • Heart disease  Father    • Stroke Brother    • Coronary artery disease Other    • Diabetes Other    • Diabetes Mother    • Leukemia Sister    • Heart failure Sister    • Leukemia Daughter              Derek Jackson III, MD, FACC

## 2020-06-09 ENCOUNTER — VIRTUAL VISIT (OUTPATIENT)
Dept: PRIMARY CARE CLINIC | Age: 70
End: 2020-06-09
Payer: MEDICARE

## 2020-06-09 PROCEDURE — G2025 DIS SITE TELE SVCS RHC/FQHC: HCPCS | Performed by: NURSE PRACTITIONER

## 2020-06-09 RX ORDER — ALPRAZOLAM 1 MG/1
.5-1 TABLET ORAL 3 TIMES DAILY PRN
Qty: 75 TABLET | Refills: 0 | Status: SHIPPED | OUTPATIENT
Start: 2020-06-09 | End: 2020-07-02

## 2020-06-09 RX ORDER — TAMSULOSIN HYDROCHLORIDE 0.4 MG/1
CAPSULE ORAL
Qty: 30 CAPSULE | Refills: 5 | Status: SHIPPED | OUTPATIENT
Start: 2020-06-09 | End: 2020-11-02

## 2020-06-09 RX ORDER — LORATADINE 10 MG/1
10 TABLET ORAL DAILY PRN
Qty: 30 TABLET | Refills: 5 | COMMUNITY
Start: 2020-06-09 | End: 2020-10-02 | Stop reason: SINTOL

## 2020-06-26 RX ORDER — ALPRAZOLAM 1 MG/1
TABLET ORAL
Qty: 75 TABLET | Refills: 0 | OUTPATIENT
Start: 2020-06-26

## 2020-06-28 ASSESSMENT — ENCOUNTER SYMPTOMS
SHORTNESS OF BREATH: 0
SORE THROAT: 0
WHEEZING: 1
COUGH: 0
NAUSEA: 0
EYE PAIN: 0
ABDOMINAL PAIN: 0
VOMITING: 0
RHINORRHEA: 1

## 2020-06-29 ENCOUNTER — HOSPITAL ENCOUNTER (OUTPATIENT)
Dept: ULTRASOUND IMAGING | Facility: HOSPITAL | Age: 70
Discharge: HOME | End: 2020-06-29
Payer: MEDICARE

## 2020-06-29 PROCEDURE — 93880 EXTRACRANIAL BILAT STUDY: CPT

## 2020-06-29 NOTE — PROGRESS NOTES
Faiza Lacey is a 78 y.o. male evaluated via telephone on 6/9/2020. The visit was conducted with the patient in his/her home and provider in her home. Consent:  He and/or health care decision maker is aware that that he may receive a bill for this telephone service, depending on his insurance coverage, and has provided verbal consent to proceed: Yes    SUBJECTIVE:    Health Maintenance Due   Topic Date Due    AAA screen  1950    Hepatitis C screen  1950    Diabetic foot exam  12/26/1959    Diabetic retinal exam  12/26/1959    Shingles Vaccine (1 of 2) 12/26/1999    Low dose CT lung screening  12/26/2004    Diabetic microalbuminuria test  11/17/2018       HPI:   Chief Complaint   Patient presents with    COPD    Allergies    Anxiety   His weight 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 He has been wheezing from the pollen. Patient's medications, allergies, past medical, surgical, social and family histories were reviewed and updated as appropriate. Review of Systems   Constitutional: Negative for chills and fever. HENT: Positive for rhinorrhea and sneezing. Negative for ear pain and sore throat. Eyes: Negative for pain and visual disturbance. Respiratory: Positive for wheezing. Negative for cough and shortness of breath. Cardiovascular: Negative for chest pain, palpitations and leg swelling. Gastrointestinal: Negative for abdominal pain, nausea and vomiting. Genitourinary: Negative for dysuria and hematuria. Musculoskeletal: Negative for joint swelling. Skin: Negative for rash. Neurological: Negative for dizziness and weakness. Psychiatric/Behavioral: Negative for sleep disturbance. The patient is nervous/anxious. OBJECTIVE:  There were no vitals taken for this visit. Physical Exam  Pulmonary:      Effort: Pulmonary effort is normal.   Neurological:      Mental Status: He is alert and oriented to person, place, and time.          No

## 2020-06-30 ENCOUNTER — TELEPHONE (OUTPATIENT)
Dept: PRIMARY CARE CLINIC | Age: 70
End: 2020-06-30

## 2020-07-07 RX ORDER — NITROGLYCERIN 0.4 MG/1
TABLET SUBLINGUAL
Qty: 25 TABLET | Refills: 1 | Status: SHIPPED | OUTPATIENT
Start: 2020-07-07 | End: 2020-08-21

## 2020-07-16 NOTE — PROGRESS NOTES
[facility]  Office visit  Geronimo Pizano  1950  [phone]    VISIT DATE:  11/14/2019      PCP: Johana Garcia APRN  [address] [place] [id number]    CC:  Chief Complaint   Patient presents with   • Coronary Artery Disease       PROBLEM LIST:  1. Coronary artery disease:  a. Remote bare-metal stent, April 1999.  b. Recurrent stenting of left anterior descending coronary artery in 2003.  c. Cardiac catheterization in August 2007, revealing normal EF with 25% in-stent stenosis in the left anterior descending coronary artery and 50% stenosis in the first obtuse marginal branch of the circumflex.  d. Left heart catheterization, 06/16/2009: 25% in-stent restenosis of the LAD, normal EF.  e. Normal nuclear perfusion study, 03/19/2015, Nora Guzmán MD, revealing no evidence of reducible ischemia, normal LV systolic function and wall motion.   f. Echo 10/2018       -Ejection fraction is visually estimated to be 60-65 %.        -Mild concentric left ventricular hypertrophy is present.        -Diastolic filling parameters suggests grade I diastolic dysfunction .        -Mild-to-moderate aortic regurgitation is present.      G. cardiac catheterization January 18, 2019  · 90% proximal LAD stenosis treated with 3.0 x 12 mm Debbie drug-eluting stent  · Patent proximal and mid LAD stents.  · 90% distal LAD stenosis  · 30-40% distal left main stenosis  · Elevated LVEDP  · No aortic stenosis  2. Aortic insufficiency, mild-moderate  3. Carotid artery disease:  a. Asymptomatic.  b. Carotid duplex, 08/11/2012: 40% to 60% stenosis of the left ICA, less than 40% of the right ICA.  c. Carotid ultrasound, 10/04/2013: No change from previous exams, with 30% stenosis on the right carotid artery and 50% on the left.  d. March 2018:1. Right internal carotid artery estimated diameter reduction:  20-49 %.  2. Left internal carotid artery estimated diameter reduction:  20-49 %. However, the  PT CALLED TO REQUEST REFILLS FOR metoprolol tartrate (LOPRESSOR) 25 MG tablet AND pravastatin (PRAVACHOL) 20 MG tablet. PT STATES SHE WILL NOT HAVE ENOUGH TO LAST UNTIL HER APT ON Tuesday. PLEASE SEND TO [facility].    "ICA:ECA ratio is elevated suggesting that  this may represent a 50-69% stenosis similar to that seen on the  study of 12/27/2016.  4. Palpitations: Holter January 2018-frequent premature atrial contractions and premature ventricular contractions.  5. Hypercholesterolemia.  6. Chronic back pain.  7. Recent smoking cessation.  8. Mild anxiety.  9. Acid reflux.  10. Chronic obstructive pulmonary disease.  11. Surgical history: Remote appendectomy.    ASSESSMENT:   Diagnosis Plan   1. Coronary artery disease involving native coronary artery of native heart without angina pectoris     2. Hypercholesterolemia     3. Bilateral carotid artery stenosis         PLAN:  Coronary artery disease: Angina resolved after recent percutaneous LAD intervention.  Continue dual antiplatelet therapy for 12 months post intervention.  Previously intolerant to high intensity statin therapy.  Discontinuing Brilinta on January.    Hypercholesterolemia: Goal LDL less than 70.  Continue statin.  Previously intolerant to higher intensity statin therapy.    Peripheral vascular disease: Mild bilateral carotid disease. Continue current medical therapy with carotid ultrasound imaging every 2-3 years.  Due at follow-up in 6 months.    Nicotine addiction: Counseled on need for smoking cessation.    Subjective  Still smoking about 5 cigars/day.  Blood pressures running less than 140/90 mmHg.  He reports being compliant with Brilinta.  Was switched off Plavix due to  dyspepsia which resolved.  Intermittent atypical chest discomfort which is brought on by anxiety..  Denies palpitations.  No myalgias on statin therapy.     PHYSICAL EXAMINATION:  Vitals:    11/14/19 1435   BP: 128/64   BP Location: Right arm   Patient Position: Sitting   Pulse: 68   SpO2: 98%   Weight: 61.2 kg (135 lb)   Height: 170.2 cm (67\")     General Appearance:    Alert, cooperative, no distress, appears stated age   Head:    Normocephalic, without obvious abnormality, atraumatic "   Eyes:    conjunctiva/corneas clear   Nose:   Nares normal, septum midline, mucosa normal, no drainage   Throat:   Lips, teeth and gums normal   Neck:   Supple, symmetrical, trachea midline, soft left carotid bruit   Lungs:     Somewhat diminished throughout, scattered expiratory wheezing, respirations unlabored   Chest Wall:    No tenderness or deformity    Heart:    Regular rate and rhythm, S1 and S2 normal, no murmur, rub   or gallop, normal carotid impulse bilaterally without bruit.   Abdomen:     Soft, non-tender   Extremities:   Extremities normal, atraumatic, no cyanosis or edema   Pulses:   2+ and symmetric all extremities   Skin:   Skin color, texture, turgor normal, no rashes or lesions       Diagnostic Data:  Procedures  Lab Results   Component Value Date    CHLPL 154 07/17/2019    TRIG 77 07/17/2019    HDL 58 07/17/2019     Lab Results   Component Value Date    GLUCOSE 127 (H) 01/18/2019    BUN 7 (L) 01/18/2019    CREATININE 0.81 01/18/2019     01/18/2019    K 4.3 01/18/2019     01/18/2019    CO2 29.0 01/18/2019     Lab Results   Component Value Date    HGBA1C 6.2 (H) 07/17/2019     Lab Results   Component Value Date    WBC 4.7 07/17/2019    HGB 18.5 (H) 07/17/2019    HCT 53.9 07/17/2019     07/17/2019       Allergies  Allergies   Allergen Reactions   • Metoprolol Shortness Of Breath   • Cetirizine      Insomnia   • Fluoxetine      Dizziness and stomach pain   • Promethazine      itching   • Rosuvastatin Other (See Comments)     Myalgias   • Sulfa Antibiotics      Oral blisters, diarrhea   • Varenicline Swelling     Throat swelling   • Penicillins Rash       Current Medications    Current Outpatient Medications:   •  albuterol (PROVENTIL HFA;VENTOLIN HFA) 108 (90 Base) MCG/ACT inhaler, Inhale 2 puffs Every 4 (Four) Hours As Needed for Wheezing., Disp: , Rfl:   •  ALPRAZolam (XANAX) 1 MG tablet, Take 1 mg by mouth 2 (Two) Times a Day As Needed for Anxiety., Disp: , Rfl:   •  aspirin 81  MG EC tablet, Take 81 mg by mouth Daily., Disp: , Rfl:   •  citalopram (CeleXA) 40 MG tablet, Take 40 mg by mouth Daily., Disp: , Rfl: 5  •  cyclobenzaprine (FLEXERIL) 10 MG tablet, Take 10 mg by mouth Daily., Disp: , Rfl:   •  fluticasone (FLONASE) 50 MCG/ACT nasal spray, 2 sprays into the nostril(s) as directed by provider As Needed., Disp: , Rfl:   •  gabapentin (NEURONTIN) 600 MG tablet, Take 600 mg by mouth 4 (Four) Times a Day., Disp: , Rfl:   •  HYDROcodone-acetaminophen (NORCO)  MG per tablet, Take 1 tablet by mouth 4 (Four) Times a Day As Needed for Moderate Pain ., Disp: , Rfl:   •  nitroglycerin (NITROSTAT) 0.4 MG SL tablet, 1 under the tongue as needed for angina, may repeat q5mins for up three doses, Disp: 100 tablet, Rfl: 11  •  pravastatin (PRAVACHOL) 40 MG tablet, Take 40 mg by mouth Daily., Disp: , Rfl:   •  tamsulosin (FLOMAX) 0.4 MG capsule 24 hr capsule, Take 1 capsule by mouth As Needed., Disp: , Rfl:   •  ticagrelor (BRILINTA) 90 MG tablet tablet, Take 1 tablet by mouth 2 (Two) Times a Day., Disp: 60 tablet, Rfl: 11  •  traZODone (DESYREL) 100 MG tablet, Take 100 mg by mouth Every Night. 2 tabs at night, Disp: , Rfl:           ROS  Review of Systems   Cardiovascular: Positive for dyspnea on exertion and irregular heartbeat. Negative for chest pain and leg swelling.   Respiratory: Positive for cough, shortness of breath and snoring. Negative for wheezing.    Neurological: Positive for dizziness.          SOCIAL HX  Social History     Socioeconomic History   • Marital status:      Spouse name: Not on file   • Number of children: Not on file   • Years of education: Not on file   • Highest education level: Not on file   Tobacco Use   • Smoking status: Current Every Day Smoker     Packs/day: 0.00     Types: Cigars   • Smokeless tobacco: Never Used   • Tobacco comment: 3 cigars daily   Substance and Sexual Activity   • Alcohol use: No   • Drug use: No   • Sexual activity: Defer        FAMILY HX  Family History   Problem Relation Age of Onset   • Diabetes Father    • Heart disease Father    • Stroke Brother    • Coronary artery disease Other    • Diabetes Other    • Diabetes Mother    • Leukemia Sister    • Heart failure Sister    • Leukemia Daughter              Derek Jackson III, MD, FACC

## 2020-07-29 RX ORDER — ALPRAZOLAM 1 MG/1
TABLET ORAL
Qty: 75 TABLET | Refills: 0 | Status: SHIPPED | OUTPATIENT
Start: 2020-07-29 | End: 2020-08-21

## 2020-08-11 ENCOUNTER — TELEPHONE (OUTPATIENT)
Dept: PRIMARY CARE CLINIC | Age: 70
End: 2020-08-11

## 2020-08-11 NOTE — TELEPHONE ENCOUNTER
Called to reschedule his appt for this Friday as winsome is out , he said he wanted to talk to her about some test he had done,. And he had sinus problems but he would not see anyone else but winsome till he talked with her. .. if she could call him Tuesday 18th / or someone he would appreciate it. ..

## 2020-08-19 RX ORDER — AMOXICILLIN AND CLAVULANATE POTASSIUM 875; 125 MG/1; MG/1
1 TABLET, FILM COATED ORAL 2 TIMES DAILY
Qty: 20 TABLET | Refills: 0 | Status: SHIPPED | OUTPATIENT
Start: 2020-08-19 | End: 2020-08-29

## 2020-08-19 NOTE — TELEPHONE ENCOUNTER
Pt would like an abx sent to [facility]. He wants his results of US faxed to Dr. Ariana Chambers for upcoming appt he has.   Is this okay?!

## 2020-08-19 NOTE — TELEPHONE ENCOUNTER
Please call and tell him I did not get a chance to call him yesterday. I can send him something for his sinuses. He probably wants to know more about his carotid US. He has blockage on both sides but it is not bad enough for surgery. It needs to be monitored. We can repeat it in a year if he does not want to see a vascular surgery now.

## 2020-08-21 RX ORDER — ALPRAZOLAM 1 MG/1
TABLET ORAL
Qty: 75 TABLET | Refills: 0 | Status: SHIPPED | OUTPATIENT
Start: 2020-08-21 | End: 2020-09-17

## 2020-08-21 RX ORDER — NITROGLYCERIN 0.4 MG/1
TABLET SUBLINGUAL
Qty: 25 TABLET | Refills: 1 | Status: SHIPPED | OUTPATIENT
Start: 2020-08-21 | End: 2022-04-07

## 2020-08-27 ENCOUNTER — OFFICE VISIT (OUTPATIENT)
Dept: CARDIOLOGY | Facility: CLINIC | Age: 70
End: 2020-08-27

## 2020-08-27 VITALS
DIASTOLIC BLOOD PRESSURE: 68 MMHG | SYSTOLIC BLOOD PRESSURE: 150 MMHG | BODY MASS INDEX: 19.73 KG/M2 | OXYGEN SATURATION: 98 % | HEART RATE: 54 BPM | HEIGHT: 67 IN | WEIGHT: 125.7 LBS

## 2020-08-27 DIAGNOSIS — I25.10 CORONARY ARTERY DISEASE INVOLVING NATIVE CORONARY ARTERY OF NATIVE HEART WITHOUT ANGINA PECTORIS: Primary | ICD-10-CM

## 2020-08-27 DIAGNOSIS — E78.00 HYPERCHOLESTEROLEMIA: ICD-10-CM

## 2020-08-27 DIAGNOSIS — I65.23 BILATERAL CAROTID ARTERY STENOSIS: ICD-10-CM

## 2020-08-27 PROCEDURE — 99214 OFFICE O/P EST MOD 30 MIN: CPT | Performed by: INTERNAL MEDICINE

## 2020-08-27 RX ORDER — GUAIFENESIN 600 MG/1
1200 TABLET, EXTENDED RELEASE ORAL 2 TIMES DAILY PRN
COMMUNITY
End: 2021-09-23

## 2020-08-27 NOTE — PROGRESS NOTES
[facility]  Office visit  Geronimo Pizano  1950  [phone]    VISIT DATE:  8/27/2020      PCP: Johana Garcia APRN  [address] [place] [id number]    CC:  Chief Complaint   Patient presents with   • Chest Pain   • Dizziness   • Shortness of Breath   • Coronary Artery Disease       PROBLEM LIST:  1. Coronary artery disease:  a. Remote bare-metal stent, April 1999.  b. Recurrent stenting of left anterior descending coronary artery in 2003.  c. Cardiac catheterization in August 2007, revealing normal EF with 25% in-stent stenosis in the left anterior descending coronary artery and 50% stenosis in the first obtuse marginal branch of the circumflex.  d. Left heart catheterization, 06/16/2009: 25% in-stent restenosis of the LAD, normal EF.  e. Normal nuclear perfusion study, 03/19/2015, Nora Guzmán MD, revealing no evidence of reducible ischemia, normal LV systolic function and wall motion.   f. Echo 10/2018       -Ejection fraction is visually estimated to be 60-65 %.        -Mild concentric left ventricular hypertrophy is present.        -Diastolic filling parameters suggests grade I diastolic dysfunction .        -Mild-to-moderate aortic regurgitation is present.      G. cardiac catheterization January 18, 2019  · 90% proximal LAD stenosis treated with 3.0 x 12 mm Debbie drug-eluting stent  · Patent proximal and mid LAD stents.  · 90% distal LAD stenosis  · 30-40% distal left main stenosis  · Elevated LVEDP  · No aortic stenosis  2. Aortic insufficiency, mild-moderate  3. Carotid artery disease:  a. Asymptomatic.  b. Carotid duplex, 08/11/2012: 40% to 60% stenosis of the left ICA, less than 40% of the right ICA.  c. Carotid ultrasound, 10/04/2013: No change from previous exams, with 30% stenosis on the right carotid artery and 50% on the left.  d. March 2018:1. Right internal carotid artery estimated diameter reduction:  20-49 %.  2. Left internal carotid artery  estimated diameter reduction:  20-49 %. However, the ICA:ECA ratio is elevated suggesting that  this may represent a 50-69% stenosis similar to that seen on the  study of 12/27/2016.  4. Palpitations: Holter January 2018-frequent premature atrial contractions and premature ventricular contractions.  5. Hypercholesterolemia.  6. Chronic back pain.  7. Recent smoking cessation.  8. Mild anxiety.  9. Acid reflux.  10. Chronic obstructive pulmonary disease.  11. Surgical history: Remote appendectomy.    ASSESSMENT:   Diagnosis Plan   1. Coronary artery disease involving native coronary artery of native heart without angina pectoris     2. Hypercholesterolemia     3. Bilateral carotid artery stenosis         PLAN:  Coronary artery disease:  Intermittent atypical chest pain.    Previously refused noninvasive evaluation, not recommending cardiac catheterization at this time.  Previously intolerant to high intensity statin therapy to include atorvastatin, simvastatin and rosuvastatin.  Continue current medical therapy, given prescription for sublingual nitroglycerin.  Will proceed with more definitive evaluation of coronary anatomy if atypical anginal symptoms increase in frequency, severity or duration.  Discontinuing Brilinta.     Hypercholesterolemia: Goal LDL less than 70.  Continue pravastatin.  Previously intolerant to higher intensity statin therapy.     Peripheral vascular disease: Moderate bilateral carotid atherosclerosis.  Asymptomatic.  Continue medical therapy and annual duplex imaging.     Nicotine addiction: Counseled on need for smoking cessation.    Subjective  Smoking about 5 cigars/day.  Blood pressures running less than 140/90 mmHg.  Intermittent atypical chest discomfort which is brought on by anxiety..  Denies palpitations.  No myalgias on statin therapy.  Intermittent lightheadedness when he is working outside in the sun.  Reviewed results of recent carotid duplex imaging.    PHYSICAL  "EXAMINATION:  Vitals:    08/27/20 1030   BP: 150/68   BP Location: Right arm   Patient Position: Sitting   Pulse: 54   SpO2: 98%   Weight: 57 kg (125 lb 11.2 oz)   Height: 170.2 cm (67\")     General Appearance:    Alert, cooperative, no distress, appears stated age   Head:    Normocephalic, without obvious abnormality, atraumatic   Eyes:    conjunctiva/corneas clear   Nose:   Nares normal, septum midline, mucosa normal, no drainage   Throat:   Lips, teeth and gums normal   Neck:   Supple, symmetrical, trachea midline, soft left carotid bruit   Lungs:     Somewhat diminished throughout, scattered expiratory wheezing, respirations unlabored   Chest Wall:    No tenderness or deformity    Heart:    Regular rate and rhythm, S1 and S2 normal, no murmur, rub   or gallop, normal carotid impulse bilaterally without bruit.   Abdomen:     Soft, non-tender   Extremities:   Extremities normal, atraumatic, no cyanosis or edema   Pulses:   2+ and symmetric all extremities   Skin:   Skin color, texture, turgor normal, no rashes or lesions       Diagnostic Data:  Procedures  Lab Results   Component Value Date    CHLPL 174 12/12/2019    TRIG 64 12/12/2019    HDL 52 12/12/2019     Lab Results   Component Value Date    GLUCOSE 127 (H) 01/18/2019    BUN 7 (L) 01/18/2019    CREATININE 0.81 01/18/2019     01/18/2019    K 4.3 01/18/2019     01/18/2019    CO2 29.0 01/18/2019     Lab Results   Component Value Date    HGBA1C 6.1 (H) 12/12/2019     Lab Results   Component Value Date    WBC 5.4 12/12/2019    HGB 17.4 12/12/2019    HCT 52.7 12/12/2019     12/12/2019       Allergies  Allergies   Allergen Reactions   • Metoprolol Shortness Of Breath   • Cetirizine      Insomnia   • Fluoxetine      Dizziness and stomach pain   • Plavix [Clopidogrel Bisulfate] Other (See Comments)     Dyspepsia     • Promethazine      itching   • Rosuvastatin Other (See Comments)     Myalgias   • Sulfa Antibiotics      Oral blisters, diarrhea   • " Varenicline Swelling     Throat swelling   • Penicillins Rash       Current Medications    Current Outpatient Medications:   •  albuterol (PROVENTIL HFA;VENTOLIN HFA) 108 (90 Base) MCG/ACT inhaler, Inhale 2 puffs Every 4 (Four) Hours As Needed for Wheezing., Disp: , Rfl:   •  ALPRAZolam (XANAX) 1 MG tablet, Take 1 mg by mouth 2 (Two) Times a Day As Needed for Anxiety., Disp: , Rfl:   •  aspirin  MG tablet, Take 325 mg by mouth Daily. 1-3 tabs daily prn, Disp: , Rfl:   •  citalopram (CeleXA) 40 MG tablet, Take 40 mg by mouth Daily., Disp: , Rfl: 5  •  cyclobenzaprine (FLEXERIL) 10 MG tablet, Take 10 mg by mouth Daily., Disp: , Rfl:   •  fluticasone (FLONASE) 50 MCG/ACT nasal spray, 2 sprays into the nostril(s) as directed by provider As Needed., Disp: , Rfl:   •  fluticasone-salmeterol (Advair Diskus) 250-50 MCG/DOSE DISKUS, Inhale 1 puff 2 (Two) Times a Day., Disp: , Rfl:   •  gabapentin (NEURONTIN) 800 MG tablet, Take 600 mg by mouth 4 (Four) Times a Day., Disp: , Rfl:   •  guaiFENesin (MUCINEX) 600 MG 12 hr tablet, Take 1,200 mg by mouth 2 (Two) Times a Day As Needed for Cough., Disp: , Rfl:   •  HYDROcodone-acetaminophen (NORCO)  MG per tablet, Take 1 tablet by mouth 4 (Four) Times a Day As Needed for Moderate Pain ., Disp: , Rfl:   •  Lido-Menthol-Methyl Sal-Camph (CBD KINGS EX), Apply  topically to the appropriate area as directed As Needed., Disp: , Rfl:   •  nitroglycerin (NITROSTAT) 0.4 MG SL tablet, DISSOLVE 1 TABLET UNDER THE TONGUE AS NEEDED FOR CHEST PAIN. MAY REPEAT FOR 3 DOSES AT 5 MINUTE INTERVALS. IF NO RELIEF GO TO ER., Disp: 25 tablet, Rfl: 1  •  pravastatin (PRAVACHOL) 40 MG tablet, Take 40 mg by mouth Daily., Disp: , Rfl:   •  tamsulosin (FLOMAX) 0.4 MG capsule 24 hr capsule, Take 1 capsule by mouth Every Night., Disp: , Rfl:   •  ticagrelor (BRILINTA) 90 MG tablet tablet, Take 1 tablet by mouth 2 (Two) Times a Day., Disp: 60 tablet, Rfl: 11  •  traZODone (DESYREL) 100 MG tablet, Take  100 mg by mouth Every Night. 2 tabs at night, Disp: , Rfl:           ROS  Review of Systems   Cardiovascular: Positive for dyspnea on exertion and irregular heartbeat. Negative for chest pain and leg swelling.   Respiratory: Positive for cough, shortness of breath and snoring. Negative for wheezing.    Neurological: Positive for dizziness.          SOCIAL HX  Social History     Socioeconomic History   • Marital status:      Spouse name: Not on file   • Number of children: Not on file   • Years of education: Not on file   • Highest education level: Not on file   Tobacco Use   • Smoking status: Current Every Day Smoker     Packs/day: 0.00     Types: Cigars   • Smokeless tobacco: Never Used   • Tobacco comment: 3 cigars daily   Substance and Sexual Activity   • Alcohol use: No   • Drug use: No   • Sexual activity: Defer       FAMILY HX  Family History   Problem Relation Age of Onset   • Diabetes Father    • Heart disease Father    • Stroke Brother    • Coronary artery disease Other    • Diabetes Other    • Diabetes Mother    • Leukemia Sister    • Heart failure Sister    • Leukemia Daughter              Derek Jackson III, MD, FACC

## 2020-09-09 ENCOUNTER — NURSE TRIAGE (OUTPATIENT)
Dept: OTHER | Facility: CLINIC | Age: 70
End: 2020-09-09

## 2020-09-09 NOTE — TELEPHONE ENCOUNTER
diarrhea, bleeding)        Chest tightness for about a week. He has felt it since he woke with am.  Also having trouble breathing. Has CAD. 11. PREGNANCY: \"Is there any chance you are pregnant? \" \"When was your last menstrual period? \"        NA    Protocols used: CHEST PAIN-ADULT-[place], DIZZINESS-ADULT-[place]    Patient called pre-service center [facility]) to schedule appointment, with red flag complaint, transferred to RN access for triage. See above questions and answers. Caller talking full sentences without any distress on phone. Discussed disposition and patient not agreeable. 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 Strongly encouraged 911 and he could call from the ED to deal with animal care. He states OK and then states he will call 911 later if he worsens. Cautioned of driving self with dizziness. Discussed potential consequences for not following disposition recommendation. Aware to call back with any concerns or persistent, worsening, or new symptoms develop. Please do not respond to the triage nurse through this encounter. Any subsequent communication should be directly with the patient.

## 2020-10-02 ENCOUNTER — OFFICE VISIT (OUTPATIENT)
Dept: PRIMARY CARE CLINIC | Age: 70
End: 2020-10-02
Payer: MEDICARE

## 2020-10-02 ENCOUNTER — HOSPITAL ENCOUNTER (OUTPATIENT)
Facility: HOSPITAL | Age: 70
Discharge: HOME | End: 2020-10-02
Payer: MEDICARE

## 2020-10-02 VITALS
BODY MASS INDEX: 19.62 KG/M2 | DIASTOLIC BLOOD PRESSURE: 60 MMHG | SYSTOLIC BLOOD PRESSURE: 126 MMHG | TEMPERATURE: 97.5 F | HEIGHT: 67 IN | HEART RATE: 83 BPM | OXYGEN SATURATION: 97 % | WEIGHT: 125 LBS | RESPIRATION RATE: 16 BRPM

## 2020-10-02 LAB
A/G RATIO: 2.3 (ref 0.8–2)
ALBUMIN SERPL-MCNC: 4.3 G/DL (ref 3.4–4.8)
ALP BLD-CCNC: 61 U/L (ref 25–100)
ALT SERPL-CCNC: 40 U/L (ref 4–36)
ANION GAP SERPL CALCULATED.3IONS-SCNC: 9 MMOL/L (ref 3–16)
AST SERPL-CCNC: 24 U/L (ref 8–33)
BASOPHILS ABSOLUTE: 0 K/UL (ref 0–0.1)
BASOPHILS RELATIVE PERCENT: 0.7 %
BILIRUB SERPL-MCNC: 0.3 MG/DL (ref 0.3–1.2)
BUN BLDV-MCNC: 14 MG/DL (ref 6–20)
CALCIUM SERPL-MCNC: 8.8 MG/DL (ref 8.5–10.5)
CHLORIDE BLD-SCNC: 100 MMOL/L (ref 98–107)
CHOLESTEROL, TOTAL: 129 MG/DL (ref 0–200)
CO2: 29 MMOL/L (ref 20–30)
CREAT SERPL-MCNC: 0.8 MG/DL (ref 0.4–1.2)
CREATININE URINE: 28.3 MG/DL (ref 1.5–300)
EOSINOPHILS ABSOLUTE: 0.1 K/UL (ref 0–0.4)
EOSINOPHILS RELATIVE PERCENT: 1.7 %
GFR AFRICAN AMERICAN: >59
GFR NON-AFRICAN AMERICAN: >59
GLOBULIN: 1.9 G/DL
GLUCOSE BLD-MCNC: 104 MG/DL (ref 74–106)
HBA1C MFR BLD: 5.6 %
HCT VFR BLD CALC: 51.6 % (ref 40–54)
HDLC SERPL-MCNC: 53 MG/DL (ref 40–60)
HEMOGLOBIN: 16.3 G/DL (ref 13–18)
IMMATURE GRANULOCYTES #: 0 K/UL
IMMATURE GRANULOCYTES %: 0.2 % (ref 0–5)
LDL CHOLESTEROL CALCULATED: 65 MG/DL
LYMPHOCYTES ABSOLUTE: 1.3 K/UL (ref 1.5–4)
LYMPHOCYTES RELATIVE PERCENT: 33.2 %
MCH RBC QN AUTO: 32.3 PG (ref 27–32)
MCHC RBC AUTO-ENTMCNC: 31.6 G/DL (ref 31–35)
MCV RBC AUTO: 102.4 FL (ref 80–100)
MICROALBUMIN UR-MCNC: <1.2 MG/DL (ref 0–22)
MICROALBUMIN/CREAT UR-RTO: NORMAL MG/G (ref 0–30)
MONOCYTES ABSOLUTE: 0.3 K/UL (ref 0.2–0.8)
MONOCYTES RELATIVE PERCENT: 6.7 %
NEUTROPHILS ABSOLUTE: 2.3 K/UL (ref 2–7.5)
NEUTROPHILS RELATIVE PERCENT: 57.5 %
PDW BLD-RTO: 13.2 % (ref 11–16)
PLATELET # BLD: 156 K/UL (ref 150–400)
PMV BLD AUTO: 9.3 FL (ref 6–10)
POTASSIUM SERPL-SCNC: 4.7 MMOL/L (ref 3.4–5.1)
RBC # BLD: 5.04 M/UL (ref 4.5–6)
SODIUM BLD-SCNC: 138 MMOL/L (ref 136–145)
TOTAL PROTEIN: 6.2 G/DL (ref 6.4–8.3)
TRIGL SERPL-MCNC: 55 MG/DL (ref 0–249)
VLDLC SERPL CALC-MCNC: 11 MG/DL
WBC # BLD: 4 K/UL (ref 4–11)

## 2020-10-02 PROCEDURE — 80061 LIPID PANEL: CPT

## 2020-10-02 PROCEDURE — 2022F DILAT RTA XM EVC RTNOPTHY: CPT | Performed by: NURSE PRACTITIONER

## 2020-10-02 PROCEDURE — 1036F TOBACCO NON-USER: CPT | Performed by: NURSE PRACTITIONER

## 2020-10-02 PROCEDURE — G8926 SPIRO NO PERF OR DOC: HCPCS | Performed by: NURSE PRACTITIONER

## 2020-10-02 PROCEDURE — 83036 HEMOGLOBIN GLYCOSYLATED A1C: CPT

## 2020-10-02 PROCEDURE — 3044F HG A1C LEVEL LT 7.0%: CPT | Performed by: NURSE PRACTITIONER

## 2020-10-02 PROCEDURE — 82043 UR ALBUMIN QUANTITATIVE: CPT

## 2020-10-02 PROCEDURE — 1123F ACP DISCUSS/DSCN MKR DOCD: CPT | Performed by: NURSE PRACTITIONER

## 2020-10-02 PROCEDURE — 3017F COLORECTAL CA SCREEN DOC REV: CPT | Performed by: NURSE PRACTITIONER

## 2020-10-02 PROCEDURE — G0008 ADMIN INFLUENZA VIRUS VAC: HCPCS | Performed by: NURSE PRACTITIONER

## 2020-10-02 PROCEDURE — 80053 COMPREHEN METABOLIC PANEL: CPT

## 2020-10-02 PROCEDURE — 90688 IIV4 VACCINE SPLT 0.5 ML IM: CPT | Performed by: NURSE PRACTITIONER

## 2020-10-02 PROCEDURE — G8482 FLU IMMUNIZE ORDER/ADMIN: HCPCS | Performed by: NURSE PRACTITIONER

## 2020-10-02 PROCEDURE — 3023F SPIROM DOC REV: CPT | Performed by: NURSE PRACTITIONER

## 2020-10-02 PROCEDURE — G8427 DOCREV CUR MEDS BY ELIG CLIN: HCPCS | Performed by: NURSE PRACTITIONER

## 2020-10-02 PROCEDURE — 82570 ASSAY OF URINE CREATININE: CPT

## 2020-10-02 PROCEDURE — 85025 COMPLETE CBC W/AUTO DIFF WBC: CPT

## 2020-10-02 PROCEDURE — 4040F PNEUMOC VAC/ADMIN/RCVD: CPT | Performed by: NURSE PRACTITIONER

## 2020-10-02 PROCEDURE — 99214 OFFICE O/P EST MOD 30 MIN: CPT | Performed by: NURSE PRACTITIONER

## 2020-10-02 PROCEDURE — G8420 CALC BMI NORM PARAMETERS: HCPCS | Performed by: NURSE PRACTITIONER

## 2020-10-02 ASSESSMENT — ENCOUNTER SYMPTOMS
EYE REDNESS: 0
VOMITING: 0
ABDOMINAL PAIN: 0
RHINORRHEA: 0
EYE DISCHARGE: 0
SORE THROAT: 0
CONSTIPATION: 0
COUGH: 0
NAUSEA: 0
SHORTNESS OF BREATH: 0
EYE ITCHING: 0
DIARRHEA: 0

## 2020-10-02 NOTE — PROGRESS NOTES
SUBJECTIVE:    Patient ID: Irasema Shah is a 78 y.o. male. Medical History Review  Past Medical, Family, and Social History reviewed and does contribute to the patient presenting condition    Health Maintenance Due   Topic Date Due    AAA screen  1950    Hepatitis C screen  1950    Diabetic foot exam  12/26/1959    Diabetic retinal exam  12/26/1959    Shingles Vaccine (1 of 2) 12/26/1999    Low dose CT lung screening  12/26/2004    Annual Wellness Visit (AWV)  05/29/2019       HPI:   Chief Complaint   Patient presents with    Gastroesophageal Reflux    Coronary Artery Disease    COPD    Results     ultrasound    Blood Work   He has tried Zyrtec, which made him sleepy. He does not want to go see a surgeon for his carotid arteries yet. He would rather we repeat an US in a year. His home BP has been good. He states his appetite is good. He eats one good meal a day. Patient's medications, allergies, past medical, surgical, social and family histories were reviewed and updated as appropriate. Review of Systems Reviewed and acurate. See MA note. OBJECTIVE:  /60 (Site: Right Upper Arm, Position: Sitting)   Pulse 83   Temp 97.5 °F (36.4 °C) (Temporal)   Resp 16   Ht 5' 7\" (1.702 m)   Wt 125 lb (56.7 kg)   SpO2 97% Comment: room air  BMI 19.58 kg/m²    Physical Exam  Vitals signs reviewed. Constitutional:       General: He is not in acute distress. Appearance: He is well-developed. HENT:      Head: Normocephalic. Right Ear: Tympanic membrane normal.      Left Ear: Tympanic membrane normal.      Mouth/Throat:      Pharynx: No oropharyngeal exudate. Eyes:      General: Lids are normal.   Neck:      Musculoskeletal: Neck supple. Cardiovascular:      Rate and Rhythm: Normal rate and regular rhythm. Heart sounds: Normal heart sounds. Pulmonary:      Effort: Pulmonary effort is normal.      Breath sounds: Normal breath sounds.    Abdominal: General: Bowel sounds are normal. There is no distension. Palpations: Abdomen is soft. Tenderness: There is no abdominal tenderness. Lymphadenopathy:      Cervical: No cervical adenopathy. Skin:     General: Skin is warm and dry. Neurological:      Mental Status: He is alert and oriented to person, place, and time.          Results in Past 30 Days  Result Component Current Result Ref Range Previous Result Ref Range   Alb 4.3 (10/2/2020) 3.4 - 4.8 g/dL Not in Time Range    Albumin/Globulin Ratio 2.3 (H) (10/2/2020) 0.8 - 2.0 Not in Time Range    Alkaline Phosphatase 61 (10/2/2020) 25 - 100 U/L Not in Time Range    ALT 40 (H) (10/2/2020) 4 - 36 U/L Not in Time Range    AST 24 (10/2/2020) 8 - 33 U/L Not in Time Range    BUN 14 (10/2/2020) 6 - 20 mg/dL Not in Time Range    Calcium 8.8 (10/2/2020) 8.5 - 10.5 mg/dL Not in Time Range    Chloride 100 (10/2/2020) 98 - 107 mmol/L Not in Time Range    CO2 29 (10/2/2020) 20 - 30 mmol/L Not in Time Range    CREATININE 0.8 (10/2/2020) 0.4 - 1.2 mg/dL Not in Time Range    GFR  >59 (10/2/2020) >59 Not in Time Range    GFR Non- >59 (10/2/2020) >59 Not in Time Range    Globulin 1.9 (10/2/2020) g/dL Not in Time Range    Glucose 104 (10/2/2020) 74 - 106 mg/dL Not in Time Range    Potassium 4.7 (10/2/2020) 3.4 - 5.1 mmol/L Not in Time Range    Sodium 138 (10/2/2020) 136 - 145 mmol/L Not in Time Range    Total Bilirubin 0.3 (10/2/2020) 0.3 - 1.2 mg/dL Not in Time Range    Total Protein 6.2 (L) (10/2/2020) 6.4 - 8.3 g/dL Not in Time Range      Hemoglobin A1C (%)   Date Value   10/02/2020 5.6     Microalbumin, Random Urine (mg/dL)   Date Value   10/02/2020 <1.20     LDL Calculated (mg/dL)   Date Value   10/02/2020 65       Lab Results   Component Value Date    WBC 4.0 10/02/2020    NEUTROABS 2.3 10/02/2020    HGB 16.3 10/02/2020    HCT 51.6 10/02/2020    .4 (H) 10/02/2020     10/02/2020     Lab Results   Component Value Date TSH 2.74 08/02/2016       Prior to Visit Medications    Medication Sig Taking? Authorizing Provider   NONFORMULARY CBD oil Yes Historical Provider, MD   ALPRAZolam (XANAX) 1 MG tablet TAKE 1/2-1 TABLET BY MOUTH THREE TIMES A DAY AS NEEDED FOR SLEEP. NO EARLY REFILL Yes Alvan Primrose, APRN   PROAIR  (88 Base) MCG/ACT inhaler INHALE 2 PUFFS BY MOUTH EVERY 6 HOURS AS NEEDED FOR WHEEZING Yes Alvan Primrose, APRN   tamsulosin (FLOMAX) 0.4 MG capsule TAKE ONE CAPSULE BY MOUTH AT BEDTIME FOR PROSTATE Yes [place] Primrose, APRN   traZODone (DESYREL) 100 MG tablet TAKE TWO TABLETS BY MOUTH NIGHTLY FOR SLEEP Yes Alvan Primrose, APRN   citalopram (CELEXA) 40 MG tablet TAKE ONE TABLET BY MOUTH EVERY DAY Yes [place] Primrose, APRN   fluticasone (FLONASE) 50 MCG/ACT nasal spray USE 2 SPRAYS IN EACH NOSTRIL EVERY DAY Yes Alvan Primrose, APRN   pravastatin (PRAVACHOL) 40 MG tablet TAKE 1 AND 1/2 TABLETS BY MOUTH EVERY DAY Yes [place] Primrose, APRN   blood glucose monitor strips Use to test blood sugar once daily Yes [place] Primrose, APRN   BRILINTA 90 MG TABS tablet Take 1 tablet by mouth 2 times daily 340 B program Yes [place] Primrose APRN   gabapentin (NEURONTIN) 800 MG tablet Take 800 mg by mouth 4 times daily. Yes Historical Provider, MD   fluticasone-salmeterol (ADVAIR DISKUS) 250-50 MCG/DOSE AEPB Inhale 1 puff into the lungs 2 times daily Rinse mouth after use. Yes Nickie Primrose, APRN   HYDROcodone-acetaminophen (NORCO)  MG per tablet Norco 10 mg-325 mg tablet   Take 1 tablet 4 times a day by oral route as directed for 30 days. Yes Historical Provider, MD   cyclobenzaprine (FLEXERIL) 10 MG tablet Take 10 mg by mouth daily Yes Historical Provider, MD       ASSESSMENT:  1. Type 2 diabetes mellitus without complication, without long-term current use of insulin ([facility] [address]    2. Anxiety    3. Chronic obstructive pulmonary disease with acute exacerbation ([facility] [address]    4. Occlusion and stenosis of bilateral carotid arteries     5. Need for influenza vaccination    6. Allergic rhinitis, unspecified seasonality, unspecified trigger        PLAN:    Orders Placed This Encounter   Procedures    INFLUENZA, QUADV, 3 YRS AND OLDER, IM, MDV, 0.5ML (Domi Aceves)     Declines vascular surgery consult. Repeat carotid US in one year. Return in about 3 months (around 1/2/2021).

## 2020-10-02 NOTE — PROGRESS NOTES
Chief Complaint   Patient presents with    Gastroesophageal Reflux    Coronary Artery Disease    COPD    Results     ultrasound    Blood Work       Have you seen any other physician or provider since your last visit no    Have you had any other diagnostic tests since your last visit? no    Have you changed or stopped any medications since your last visit? no         Diabetic retinal exam completed this year? No                     Review of Systems   Constitutional: Negative for chills, fatigue and fever. HENT: Negative for congestion, ear pain, rhinorrhea and sore throat. Eyes: Negative for discharge, redness and itching. Respiratory: Negative for cough and shortness of breath. Cardiovascular: Negative for chest pain, palpitations and leg swelling. Gastrointestinal: Negative for abdominal pain, constipation, diarrhea, nausea and vomiting. Endocrine: Negative for cold intolerance and heat intolerance. Genitourinary: Negative for dysuria. Musculoskeletal: Negative for arthralgias and joint swelling. Skin: Negative for rash and wound. Neurological: Negative for weakness and headaches. Hematological: Negative for adenopathy. Psychiatric/Behavioral: Negative for dysphoric mood and sleep disturbance. The patient is not nervous/anxious. Vaccine Information Sheet, \"Influenza - Inactivated\"  given to Tato Mcintosh, or parent/legal guardian of  Tato Mcintosh and verbalized understanding. Patient responses:    Have you ever had a reaction to a flu vaccine? No  Do you have any current illness? No  Have you ever had Guillian Bassett Syndrome? No  Do you have a serious allergy to any of the follow: Neomycin, Polymyxin, Thimerosal, eggs or egg products? No    Flu vaccine given per order. Please see immunization tab. Risks and benefits explained. Current VIS given. Pt stayed in exam room x 20 mins after imm.       Immunizations Administered     Name Date Dose Route Influenza, Quadv, IM, (6 mo and older Fluzone, Flulaval, Fluarix and 3 yrs and older Afluria) 10/2/2020 0.5 mL Intramuscular    Site: Deltoid- Left    Lot: [id number]    NDC: 00686-726-84

## 2020-10-09 RX ORDER — ALPRAZOLAM 1 MG/1
TABLET ORAL
Qty: 75 TABLET | Refills: 0 | Status: SHIPPED | OUTPATIENT
Start: 2020-10-09 | End: 2020-11-07

## 2020-11-02 RX ORDER — TAMSULOSIN HYDROCHLORIDE 0.4 MG/1
CAPSULE ORAL
Qty: 30 CAPSULE | Refills: 5 | Status: SHIPPED | OUTPATIENT
Start: 2020-11-02 | End: 2021-04-29

## 2020-11-02 RX ORDER — CITALOPRAM 40 MG/1
TABLET ORAL
Qty: 30 TABLET | Refills: 5 | Status: SHIPPED | OUTPATIENT
Start: 2020-11-02 | End: 2021-04-29

## 2020-11-05 RX ORDER — TRAZODONE HYDROCHLORIDE 100 MG/1
TABLET ORAL
Qty: 60 TABLET | Refills: 5 | Status: SHIPPED | OUTPATIENT
Start: 2020-11-05 | End: 2020-11-23

## 2020-11-05 RX ORDER — PRAVASTATIN SODIUM 40 MG
TABLET ORAL
Qty: 45 TABLET | Refills: 5 | Status: SHIPPED | OUTPATIENT
Start: 2020-11-05 | End: 2020-11-23

## 2020-11-05 RX ORDER — FLUTICASONE PROPIONATE 50 MCG
SPRAY, SUSPENSION (ML) NASAL
Qty: 16 G | Refills: 5 | Status: SHIPPED | OUTPATIENT
Start: 2020-11-05 | End: 2020-11-23

## 2020-11-07 RX ORDER — ALPRAZOLAM 1 MG/1
TABLET ORAL
Qty: 75 TABLET | Refills: 0 | Status: SHIPPED | OUTPATIENT
Start: 2020-11-07 | End: 2020-11-23

## 2020-11-23 RX ORDER — FLUTICASONE PROPIONATE 50 MCG
SPRAY, SUSPENSION (ML) NASAL
Qty: 16 G | Refills: 5 | Status: SHIPPED | OUTPATIENT
Start: 2020-11-23 | End: 2021-05-27

## 2020-11-23 RX ORDER — ALPRAZOLAM 1 MG/1
TABLET ORAL
Qty: 75 TABLET | Refills: 0 | Status: SHIPPED | OUTPATIENT
Start: 2020-11-23 | End: 2020-12-18

## 2020-11-23 RX ORDER — PRAVASTATIN SODIUM 40 MG
TABLET ORAL
Qty: 45 TABLET | Refills: 5 | Status: SHIPPED | OUTPATIENT
Start: 2020-11-23 | End: 2021-02-21

## 2020-11-23 RX ORDER — TRAZODONE HYDROCHLORIDE 100 MG/1
TABLET ORAL
Qty: 60 TABLET | Refills: 5 | Status: SHIPPED | OUTPATIENT
Start: 2020-11-23 | End: 2021-04-29

## 2020-12-18 RX ORDER — ALPRAZOLAM 1 MG/1
TABLET ORAL
Qty: 75 TABLET | Refills: 0 | Status: SHIPPED | OUTPATIENT
Start: 2020-12-18 | End: 2021-01-12

## 2021-01-06 ENCOUNTER — HOSPITAL ENCOUNTER (OUTPATIENT)
Facility: HOSPITAL | Age: 71
Discharge: HOME | End: 2021-01-06
Payer: MEDICARE

## 2021-01-06 ENCOUNTER — OFFICE VISIT (OUTPATIENT)
Dept: PRIMARY CARE CLINIC | Age: 71
End: 2021-01-06
Payer: MEDICARE

## 2021-01-06 VITALS
WEIGHT: 123 LBS | SYSTOLIC BLOOD PRESSURE: 110 MMHG | DIASTOLIC BLOOD PRESSURE: 60 MMHG | RESPIRATION RATE: 16 BRPM | HEART RATE: 73 BPM | TEMPERATURE: 98.6 F | OXYGEN SATURATION: 95 % | HEIGHT: 67 IN | BODY MASS INDEX: 19.3 KG/M2

## 2021-01-06 DIAGNOSIS — E78.5 HYPERLIPIDEMIA, UNSPECIFIED HYPERLIPIDEMIA TYPE: ICD-10-CM

## 2021-01-06 DIAGNOSIS — E11.9 TYPE 2 DIABETES MELLITUS WITHOUT COMPLICATION, WITHOUT LONG-TERM CURRENT USE OF INSULIN (HCC): ICD-10-CM

## 2021-01-06 DIAGNOSIS — R63.4 WEIGHT LOSS: ICD-10-CM

## 2021-01-06 DIAGNOSIS — J44.1 CHRONIC OBSTRUCTIVE PULMONARY DISEASE WITH ACUTE EXACERBATION (HCC): ICD-10-CM

## 2021-01-06 DIAGNOSIS — E11.9 TYPE 2 DIABETES MELLITUS WITHOUT COMPLICATION, WITHOUT LONG-TERM CURRENT USE OF INSULIN (HCC): Primary | ICD-10-CM

## 2021-01-06 DIAGNOSIS — F41.9 ANXIETY: ICD-10-CM

## 2021-01-06 LAB
A/G RATIO: 2.6 (ref 0.8–2)
ALBUMIN SERPL-MCNC: 4.6 G/DL (ref 3.4–4.8)
ALP BLD-CCNC: 65 U/L (ref 25–100)
ALT SERPL-CCNC: 32 U/L (ref 4–36)
ANION GAP SERPL CALCULATED.3IONS-SCNC: 6 MMOL/L (ref 3–16)
AST SERPL-CCNC: 20 U/L (ref 8–33)
BASOPHILS ABSOLUTE: 0.1 K/UL (ref 0–0.1)
BASOPHILS RELATIVE PERCENT: 1.1 %
BILIRUB SERPL-MCNC: 0.4 MG/DL (ref 0.3–1.2)
BUN BLDV-MCNC: 14 MG/DL (ref 6–20)
CALCIUM SERPL-MCNC: 9 MG/DL (ref 8.5–10.5)
CHLORIDE BLD-SCNC: 104 MMOL/L (ref 98–107)
CHOLESTEROL, TOTAL: 150 MG/DL (ref 0–200)
CO2: 31 MMOL/L (ref 20–30)
CREAT SERPL-MCNC: 0.9 MG/DL (ref 0.4–1.2)
EOSINOPHILS ABSOLUTE: 0.1 K/UL (ref 0–0.4)
EOSINOPHILS RELATIVE PERCENT: 2.7 %
GFR AFRICAN AMERICAN: >59
GFR NON-AFRICAN AMERICAN: >59
GLOBULIN: 1.8 G/DL
GLUCOSE BLD-MCNC: 110 MG/DL (ref 74–106)
HBA1C MFR BLD: 5.9 %
HCT VFR BLD CALC: 50.1 % (ref 40–54)
HDLC SERPL-MCNC: 57 MG/DL (ref 40–60)
HEMOGLOBIN: 16.4 G/DL (ref 13–18)
IMMATURE GRANULOCYTES #: 0 K/UL
IMMATURE GRANULOCYTES %: 0 % (ref 0–5)
LDL CHOLESTEROL CALCULATED: 82 MG/DL
LYMPHOCYTES ABSOLUTE: 1.5 K/UL (ref 1.5–4)
LYMPHOCYTES RELATIVE PERCENT: 33.8 %
MCH RBC QN AUTO: 32.9 PG (ref 27–32)
MCHC RBC AUTO-ENTMCNC: 32.7 G/DL (ref 31–35)
MCV RBC AUTO: 100.6 FL (ref 80–100)
MONOCYTES ABSOLUTE: 0.4 K/UL (ref 0.2–0.8)
MONOCYTES RELATIVE PERCENT: 8 %
NEUTROPHILS ABSOLUTE: 2.5 K/UL (ref 2–7.5)
NEUTROPHILS RELATIVE PERCENT: 54.4 %
PDW BLD-RTO: 13.5 % (ref 11–16)
PLATELET # BLD: 162 K/UL (ref 150–400)
PMV BLD AUTO: 9.3 FL (ref 6–10)
POTASSIUM SERPL-SCNC: 4.6 MMOL/L (ref 3.4–5.1)
RBC # BLD: 4.98 M/UL (ref 4.5–6)
SODIUM BLD-SCNC: 141 MMOL/L (ref 136–145)
TOTAL PROTEIN: 6.4 G/DL (ref 6.4–8.3)
TRIGL SERPL-MCNC: 56 MG/DL (ref 0–249)
TSH SERPL DL<=0.05 MIU/L-ACNC: 2.82 UIU/ML (ref 0.27–4.2)
VLDLC SERPL CALC-MCNC: 11 MG/DL
WBC # BLD: 4.5 K/UL (ref 4–11)

## 2021-01-06 PROCEDURE — 3017F COLORECTAL CA SCREEN DOC REV: CPT | Performed by: NURSE PRACTITIONER

## 2021-01-06 PROCEDURE — 99214 OFFICE O/P EST MOD 30 MIN: CPT | Performed by: NURSE PRACTITIONER

## 2021-01-06 PROCEDURE — 4040F PNEUMOC VAC/ADMIN/RCVD: CPT | Performed by: NURSE PRACTITIONER

## 2021-01-06 PROCEDURE — 85025 COMPLETE CBC W/AUTO DIFF WBC: CPT

## 2021-01-06 PROCEDURE — 80061 LIPID PANEL: CPT

## 2021-01-06 PROCEDURE — 83036 HEMOGLOBIN GLYCOSYLATED A1C: CPT

## 2021-01-06 PROCEDURE — 84443 ASSAY THYROID STIM HORMONE: CPT

## 2021-01-06 PROCEDURE — 1123F ACP DISCUSS/DSCN MKR DOCD: CPT | Performed by: NURSE PRACTITIONER

## 2021-01-06 PROCEDURE — 3044F HG A1C LEVEL LT 7.0%: CPT | Performed by: NURSE PRACTITIONER

## 2021-01-06 PROCEDURE — G8482 FLU IMMUNIZE ORDER/ADMIN: HCPCS | Performed by: NURSE PRACTITIONER

## 2021-01-06 PROCEDURE — 3023F SPIROM DOC REV: CPT | Performed by: NURSE PRACTITIONER

## 2021-01-06 PROCEDURE — 1036F TOBACCO NON-USER: CPT | Performed by: NURSE PRACTITIONER

## 2021-01-06 PROCEDURE — G8420 CALC BMI NORM PARAMETERS: HCPCS | Performed by: NURSE PRACTITIONER

## 2021-01-06 PROCEDURE — G8427 DOCREV CUR MEDS BY ELIG CLIN: HCPCS | Performed by: NURSE PRACTITIONER

## 2021-01-06 PROCEDURE — 2022F DILAT RTA XM EVC RTNOPTHY: CPT | Performed by: NURSE PRACTITIONER

## 2021-01-06 PROCEDURE — G8926 SPIRO NO PERF OR DOC: HCPCS | Performed by: NURSE PRACTITIONER

## 2021-01-06 PROCEDURE — 80053 COMPREHEN METABOLIC PANEL: CPT

## 2021-01-06 RX ORDER — FLUTICASONE FUROATE AND VILANTEROL TRIFENATATE 100; 25 UG/1; UG/1
1 POWDER RESPIRATORY (INHALATION) DAILY
Qty: 1 EACH | Refills: 5 | Status: SHIPPED | OUTPATIENT
Start: 2021-01-06

## 2021-01-06 ASSESSMENT — ENCOUNTER SYMPTOMS
RHINORRHEA: 0
CONSTIPATION: 0
ABDOMINAL PAIN: 0
EYE ITCHING: 0
VOMITING: 0
SHORTNESS OF BREATH: 0
EYE REDNESS: 0
DIARRHEA: 0
NAUSEA: 0
EYE DISCHARGE: 0
SORE THROAT: 0
COUGH: 0

## 2021-01-06 ASSESSMENT — PATIENT HEALTH QUESTIONNAIRE - PHQ9
SUM OF ALL RESPONSES TO PHQ9 QUESTIONS 1 & 2: 1
SUM OF ALL RESPONSES TO PHQ QUESTIONS 1-9: 1
2. FEELING DOWN, DEPRESSED OR HOPELESS: 1
SUM OF ALL RESPONSES TO PHQ QUESTIONS 1-9: 1

## 2021-01-06 NOTE — PROGRESS NOTES
Health Maintenance Due This Visit   Colonoscopy No   Mammogram No   Annual Wellness Visit No   Microalbumin No   HgbA1C No   Diabetic Eye Exam Yes- will sched own appt. [employer] Due This Visit   CARMELO Yes   UDS no   Contract Yes      Have you seen any other physician or provider since your last visit? Yes, pain clinic    Have you had any other diagnostic tests since your last visit? No    Have you changed or stopped any medications since your last visit? No    SUBJECTIVE:    Patient ID: Ignacia Weems is a 69 y.o. male. Medical History Review  Past Medical, Family, and Social History reviewed. Health Maintenance Due   Topic Date Due    AAA screen  1949    Hepatitis C screen  1950    Diabetic foot exam  12/26/1959    Diabetic retinal exam  12/26/1959    Shingles Vaccine (1 of 2) 12/26/1999    Low dose CT lung screening  12/26/2004    Annual Wellness Visit (AWV)  05/29/2019       HPI:   Chief Complaint   Patient presents with    Anxiety    COPD    Hyperlipidemia     Pt f/u on hyperlipidemia, COPD and anxiety. Would like carotid artery ultrasound. He has some chest tightness and wheezing. Patient's medications, allergies, past medical, surgical, social and family histories were reviewed and updated as appropriate. Review of Systems   Constitutional: Negative for chills, fatigue and fever. HENT: Negative for congestion, ear pain, rhinorrhea and sore throat. Eyes: Negative for discharge, redness and itching. Respiratory: Negative for cough and shortness of breath. Cardiovascular: Negative for chest pain, palpitations and leg swelling. Gastrointestinal: Negative for abdominal pain, constipation, diarrhea, nausea and vomiting. Endocrine: Negative for cold intolerance and heat intolerance. Genitourinary: Negative for dysuria. Musculoskeletal: Negative for arthralgias and joint swelling. Skin: Negative for rash and wound.    Neurological: Negative for weakness and headaches. Hematological: Negative for adenopathy. Psychiatric/Behavioral: Negative for dysphoric mood and sleep disturbance. The patient is not nervous/anxious. Reviewed and acurate. See MA note. OBJECTIVE:  /60   Pulse 73   Temp 98.6 °F (37 °C) (Temporal)   Resp 16   Ht 5' 7\" (1.702 m)   Wt 123 lb (55.8 kg)   SpO2 95% Comment: room air  BMI 19.26 kg/m²    Physical Exam  Constitutional:       Appearance: He is well-developed. HENT:      Head: Normocephalic and atraumatic. Right Ear: External ear normal.      Left Ear: External ear normal.   Eyes:      Conjunctiva/sclera: Conjunctivae normal.      Pupils: Pupils are equal, round, and reactive to light. Neck:      Musculoskeletal: Neck supple. Thyroid: No thyromegaly. Vascular: No JVD. Cardiovascular:      Rate and Rhythm: Normal rate and regular rhythm. Heart sounds: Normal heart sounds. No murmur. No friction rub. No gallop. Pulmonary:      Effort: Pulmonary effort is normal. No respiratory distress. Breath sounds: Wheezing present. Abdominal:      General: Bowel sounds are normal. There is no distension. Palpations: Abdomen is soft. Tenderness: There is no abdominal tenderness. Musculoskeletal: Normal range of motion. Lymphadenopathy:      Cervical: No cervical adenopathy. Skin:     General: Skin is warm and dry. Neurological:      Mental Status: He is alert and oriented to person, place, and time. Cranial Nerves: No cranial nerve deficit.          Results in Past 30 Days  Result Component Current Result Ref Range Previous Result Ref Range   Alb 4.6 (1/6/2021) 3.4 - 4.8 g/dL Not in Time Range    Albumin/Globulin Ratio 2.6 (H) (1/6/2021) 0.8 - 2.0 Not in Time Range    Alkaline Phosphatase 65 (1/6/2021) 25 - 100 U/L Not in Time Range    ALT 32 (1/6/2021) 4 - 36 U/L Not in Time Range    AST 20 (1/6/2021) 8 - 33 U/L Not in Time Range    BUN 14 (1/6/2021) 6 - 20 mg/dL Not in Time Range    Calcium 9.0 (1/6/2021) 8.5 - 10.5 mg/dL Not in Time Range    Chloride 104 (1/6/2021) 98 - 107 mmol/L Not in Time Range    CO2 31 (H) (1/6/2021) 20 - 30 mmol/L Not in Time Range    CREATININE 0.9 (1/6/2021) 0.4 - 1.2 mg/dL Not in Time Range    GFR  >59 (1/6/2021) >59 Not in Time Range    GFR Non- >59 (1/6/2021) >59 Not in Time Range    Globulin 1.8 (1/6/2021) g/dL Not in Time Range    Glucose 110 (H) (1/6/2021) 74 - 106 mg/dL Not in Time Range    Potassium 4.6 (1/6/2021) 3.4 - 5.1 mmol/L Not in Time Range    Sodium 141 (1/6/2021) 136 - 145 mmol/L Not in Time Range    Total Bilirubin 0.4 (1/6/2021) 0.3 - 1.2 mg/dL Not in Time Range    Total Protein 6.4 (1/6/2021) 6.4 - 8.3 g/dL Not in Time Range      Hemoglobin A1C (%)   Date Value   01/06/2021 5.9     Microalbumin, Random Urine (mg/dL)   Date Value   10/02/2020 <1.20     LDL Calculated (mg/dL)   Date Value   01/06/2021 82       Lab Results   Component Value Date    WBC 4.5 01/06/2021    NEUTROABS 2.5 01/06/2021    HGB 16.4 01/06/2021    HCT 50.1 01/06/2021    .6 (H) 01/06/2021     01/06/2021     Lab Results   Component Value Date    TSH 2.82 01/06/2021       Prior to Visit Medications    Medication Sig Taking?  Authorizing Provider   fluticasone-vilanterol (BREO ELLIPTA) 100-25 MCG/INH AEPB inhaler Inhale 1 puff into the lungs daily Yes VIKI Beaver   ALPRAZolam (XANAX) 1 MG tablet TAKE 1/2-1 TABLET BY MOUTH THREE TIMES A DAY AS NEEDED FOR SLEEP Yes VIKI Beaver   PROAIR  (43 Base) MCG/ACT inhaler INHALE 2 PUFFS BY MOUTH EVERY 6 HOURS AS NEEDED FOR WHEEZING Yes VIKI Beaver   traZODone (DESYREL) 100 MG tablet TAKE TWO TABLETS BY MOUTH NIGHTLY FOR SLEEP Yes VIKI Beaver   pravastatin (PRAVACHOL) 40 MG tablet TAKE 1 AND 1/2 TABLETS BY MOUTH EVERY DAY Yes VIKI Beaver   fluticasone (FLONASE) 50 MCG/ACT nasal spray USE 2 SPRAYS IN EACH NOSTRIL EVERY DAY Yes Socrates Hoffman VIKI [place]   citalopram (CELEXA) 40 MG tablet TAKE ONE TABLET BY MOUTH EVERY DAY Yes VIKI Coates   tamsulosin (FLOMAX) 0.4 MG capsule TAKE ONE CAPSULE BY MOUTH EVERY EVENING AT BEDTIME FOR PROSTATE Yes VIKI Coates   NONFORMULARY CBD oil Yes Historical Provider, MD   blood glucose monitor strips Use to test blood sugar once daily Yes VIKI Coates   BRILINTA 90 MG TABS tablet Take 1 tablet by mouth 2 times daily 340 B program Yes VIKI Coates   gabapentin (NEURONTIN) 800 MG tablet Take 800 mg by mouth 4 times daily. Yes Historical Provider, MD   HYDROcodone-acetaminophen (NORCO)  MG per tablet Norco 10 mg-325 mg tablet   Take 1 tablet 4 times a day by oral route as directed for 30 days. Yes Historical Provider, MD   cyclobenzaprine (FLEXERIL) 10 MG tablet Take 10 mg by mouth daily Yes Historical Provider, MD       ASSESSMENT:  1. Type 2 diabetes mellitus without complication, without long-term current use of insulin ([facility]    2. Hyperlipidemia, unspecified hyperlipidemia type    3. Chronic obstructive pulmonary disease with acute exacerbation ([facility]    4. Weight loss    5. Anxiety        PLAN:  Orders Placed This Encounter   Medications    fluticasone-vilanterol (BREO ELLIPTA) 100-25 MCG/INH AEPB inhaler     Sig: Inhale 1 puff into the lungs daily     Dispense:  1 each     Refill:  5     Orders Placed This Encounter   Procedures    COMPREHENSIVE METABOLIC PANEL    HEMOGLOBIN A1C    LIPID PANEL    CBC WITH AUTO DIFFERENTIAL    TSH without Reflex     Patient Instructions   · Keep a list of your medicines with you. List all of the prescription medicines, nonprescription medicines, supplements, natural remedies, and vitamins that you take. Tell your healthcare providers who treat you about all of the products you are taking. Your provider can provide you with a form to keep track of them. Just ask.   · Follow the directions that come with your medicine, including information care possible. In order to help us achieve these goals please remember to bring all medications, herbal products, and over the counter supplements with you to each visit. If your provider has ordered testing for you, please be sure to follow up with our office if you have not received results within 7 days after the testing took place. *If you receive a survey after visiting one of our offices, please take time to share your experience concerning your physician office visit. These surveys are confidential and no health information about you is shared. We are eager to improve for you and we are counting on your feedback to help make that happen. Thank you for requesting your Continuity of Care Document (CCD) electronically. Please follow the instructions below to securely access your online medical record. [employer] allows you to send messages to your doctor, view your test results, renew your prescriptions, schedule appointments, and more. How Do I Access my CCD? In your Internet browser, go to [email or web address] Enter your user name and password   Click on My medical Record  --> Download Summary --> Enter Password --> Download --> Save or Open Document    Additional Information  If you have questions, please contact your physician practice where you receive care. Remember, [employer] is NOT to be used for urgent needs. For medical emergencies, dial 911. Start Breo 1 puff daily. Return in about 2 months (around 3/6/2021). Toro Bailey CMA am scribing for and in the presence of VIKI Bojorquez on 1/9/2021 at 9:58 PM.      Aiyana DREW, personally performed the services described in the documentation as scribed by Jose Francisco Krause CMA, in my presence and it is both accurate and complete.

## 2021-01-06 NOTE — PATIENT INSTRUCTIONS
· Keep a list of your medicines with you. List all of the prescription medicines, nonprescription medicines, supplements, natural remedies, and vitamins that you take. Tell your healthcare providers who treat you about all of the products you are taking. Your provider can provide you with a form to keep track of them. Just ask. · Follow the directions that come with your medicine, including information about food or alcohol. Make sure you know how and when to take your medicine. Do not take more or less than you are supposed to take. · Keep all medicines out of the reach of children. · Store medicines according to the directions on the label. · Monitor yourself. Learn to know how your body reacts to your new medicine and keep track of how it makes you feel before attempting (If your provider has allowed you to do so) to drive or go to work. · Seek emergency medical attention if you think you have used too much of this medicine. An overdose of any prescription medicine can be fatal. Overdose symptoms may include extreme drowsiness, muscle weakness, confusion, cold and clammy skin, pinpoint pupils, shallow breathing, slow heart rate, fainting, or coma. · Don't share prescription medicines with others, even when they seem to have the same symptoms. What may be good for you may be harmful to others. · If you are no longer taking a prescribed medication and you have pills left please take your pills out of their original containers. Mix crushed pills with an undesirable substance, such as cat litter or used coffee grounds. Put the mixture into a disposable container with a lid, such as an empty margarine tub, or into a sealable bag. Cover up or remove any of your personal information on the empty containers by covering it with black permanent marker or duct tape. Place the sealed container with the mixture, and the empty drug containers, in the trash.    · If you use a medication that is in the form of a patch, dispose of used patches by folding them in half so that the sticky sides meet, and then flushing them down a toilet. They should not be placed in the household trash where children or pets can find them. · If you have any questions, ask your provider or pharmacist for more information. · Be sure to keep all appointments for provider visits or tests. We are committed to providing you with the best care possible. In order to help us achieve these goals please remember to bring all medications, herbal products, and over the counter supplements with you to each visit. If your provider has ordered testing for you, please be sure to follow up with our office if you have not received results within 7 days after the testing took place. *If you receive a survey after visiting one of our offices, please take time to share your experience concerning your physician office visit. These surveys are confidential and no health information about you is shared. We are eager to improve for you and we are counting on your feedback to help make that happen. Thank you for requesting your Continuity of Care Document (CCD) electronically. Please follow the instructions below to securely access your online medical record. [employer] allows you to send messages to your doctor, view your test results, renew your prescriptions, schedule appointments, and more. How Do I Access my CCD? In your Internet browser, go to [email or web address] Enter your user name and password   Click on My medical Record  --> Download Summary --> Enter Password --> Download --> Save or Open Document    Additional Information  If you have questions, please contact your physician practice where you receive care. Remember, [employer] is NOT to be used for urgent needs. For medical emergencies, dial 911. Start Breo 1 puff daily.

## 2021-01-12 DIAGNOSIS — F41.9 ANXIETY: ICD-10-CM

## 2021-01-12 RX ORDER — ALPRAZOLAM 1 MG/1
TABLET ORAL
Qty: 75 TABLET | Refills: 0 | Status: SHIPPED | OUTPATIENT
Start: 2021-01-12 | End: 2021-02-10

## 2021-02-10 DIAGNOSIS — F41.9 ANXIETY: ICD-10-CM

## 2021-02-10 RX ORDER — ALPRAZOLAM 1 MG/1
TABLET ORAL
Qty: 75 TABLET | Refills: 0 | Status: SHIPPED | OUTPATIENT
Start: 2021-02-10 | End: 2021-03-05

## 2021-02-19 DIAGNOSIS — F41.9 ANXIETY: ICD-10-CM

## 2021-02-21 RX ORDER — PRAVASTATIN SODIUM 40 MG
TABLET ORAL
Qty: 135 TABLET | Refills: 5 | Status: SHIPPED | OUTPATIENT
Start: 2021-02-21

## 2021-03-05 DIAGNOSIS — F41.9 ANXIETY: ICD-10-CM

## 2021-03-05 RX ORDER — ALPRAZOLAM 1 MG/1
TABLET ORAL
Qty: 75 TABLET | Refills: 0 | Status: SHIPPED | OUTPATIENT
Start: 2021-03-05 | End: 2021-03-31

## 2021-03-11 ENCOUNTER — OFFICE VISIT (OUTPATIENT)
Dept: CARDIOLOGY | Facility: CLINIC | Age: 71
End: 2021-03-11

## 2021-03-11 VITALS
OXYGEN SATURATION: 93 % | DIASTOLIC BLOOD PRESSURE: 58 MMHG | BODY MASS INDEX: 20 KG/M2 | HEART RATE: 105 BPM | WEIGHT: 127.4 LBS | RESPIRATION RATE: 18 BRPM | SYSTOLIC BLOOD PRESSURE: 108 MMHG | HEIGHT: 67 IN

## 2021-03-11 DIAGNOSIS — I25.10 CORONARY ARTERY DISEASE INVOLVING NATIVE CORONARY ARTERY OF NATIVE HEART WITHOUT ANGINA PECTORIS: ICD-10-CM

## 2021-03-11 DIAGNOSIS — I65.23 BILATERAL CAROTID ARTERY STENOSIS: Primary | ICD-10-CM

## 2021-03-11 DIAGNOSIS — E78.00 HYPERCHOLESTEROLEMIA: ICD-10-CM

## 2021-03-11 PROCEDURE — 99214 OFFICE O/P EST MOD 30 MIN: CPT | Performed by: INTERNAL MEDICINE

## 2021-03-11 RX ORDER — GABAPENTIN 600 MG/1
600 TABLET ORAL 4 TIMES DAILY
COMMUNITY
Start: 2021-03-01

## 2021-03-11 NOTE — PROGRESS NOTES
[facility]  Office visit  Geronimo Pizano  1950  [phone]    VISIT DATE:  3/11/2021      PCP: Johana Garcia APRN  [address] [facility] [address]    CC:  Chief Complaint   Patient presents with   • Coronary Artery Disease       PROBLEM LIST:  1. Coronary artery disease:  a. Remote bare-metal stent, April 1999.  b. Recurrent stenting of left anterior descending coronary artery in 2003.  c. Cardiac catheterization in August 2007, revealing normal EF with 25% in-stent stenosis in the left anterior descending coronary artery and 50% stenosis in the first obtuse marginal branch of the circumflex.  d. Left heart catheterization, 06/16/2009: 25% in-stent restenosis of the LAD, normal EF.  e. Normal nuclear perfusion study, 03/19/2015, Nora Guzmán MD, revealing no evidence of reducible ischemia, normal LV systolic function and wall motion.   f. Echo 10/2018       -Ejection fraction is visually estimated to be 60-65 %.        -Mild concentric left ventricular hypertrophy is present.        -Diastolic filling parameters suggests grade I diastolic dysfunction .        -Mild-to-moderate aortic regurgitation is present.      G. cardiac catheterization January 18, 2019  · 90% proximal LAD stenosis treated with 3.0 x 12 mm Debbie drug-eluting stent  · Patent proximal and mid LAD stents.  · 90% distal LAD stenosis  · 30-40% distal left main stenosis  · Elevated LVEDP  · No aortic stenosis  2. Aortic insufficiency, mild-moderate  3. Carotid artery disease:  a. Asymptomatic.  b. Carotid duplex, 08/11/2012: 40% to 60% stenosis of the left ICA, less than 40% of the right ICA.  c. Carotid ultrasound, 10/04/2013: No change from previous exams, with 30% stenosis on the right carotid artery and 50% on the left.  d. March 2018:1. Right internal carotid artery estimated diameter reduction:  20-49 %.  2. Left internal carotid artery estimated diameter reduction:  20-49 %. However, the ICA:ECA  "ratio is elevated suggesting that  this may represent a 50-69% stenosis similar to that seen on the  study of 12/27/2016.  4. Palpitations: Holter January 2018-frequent premature atrial contractions and premature ventricular contractions.  5. Hypercholesterolemia.  6. Chronic back pain.  7. Recent smoking cessation.  8. Mild anxiety.  9. Acid reflux.  10. Chronic obstructive pulmonary disease.  11. Surgical history: Remote appendectomy.    ASSESSMENT:   Diagnosis Plan   1. Coronary artery disease involving native coronary artery of native heart without angina pectoris     2. Hypercholesterolemia     3. Bilateral carotid artery stenosis         PLAN:  Coronary artery disease:  Currently asymptomatic.    Previously refused noninvasive evaluation when he was having atypical chest pain.  Previously intolerant to high intensity statin therapy to include atorvastatin, simvastatin and rosuvastatin.  Continue current medical therapy, given prescription for sublingual nitroglycerin.        Hypercholesterolemia: Goal LDL less than 70.  Continue pravastatin.  Previously intolerant to higher intensity statin therapy.     Peripheral vascular disease: Moderate bilateral carotid atherosclerosis.  Asymptomatic.  Continue medical therapy and annual duplex imaging.     Nicotine addiction: Counseled on need for smoking cessation.    Subjective  Smoking about 5 cigars/day.  Blood pressures running less than 140/90 mmHg.   Denies palpitations, limiting dyspnea and chest pain.  No myalgias on statin therapy.  Reviewed recent laboratory evaluation.    PHYSICAL EXAMINATION:  Vitals:    03/11/21 1119   BP: 108/58   BP Location: Left arm   Patient Position: Sitting   Cuff Size: Adult   Pulse: 105   Resp: 18   SpO2: 93%   Weight: 57.8 kg (127 lb 6.4 oz)   Height: 170.2 cm (67\")     General Appearance:    Alert, cooperative, no distress, appears stated age   Head:    Normocephalic, without obvious abnormality, atraumatic   Eyes:    " conjunctiva/corneas clear   Nose:   Nares normal, septum midline, mucosa normal, no drainage   Throat:   Lips, teeth and gums normal   Neck:   Supple, symmetrical, trachea midline, soft left carotid bruit   Lungs:     Somewhat diminished throughout, scattered expiratory wheezing, respirations unlabored   Chest Wall:    No tenderness or deformity    Heart:    Regular rate and rhythm, S1 and S2 normal, no murmur, rub   or gallop, normal carotid impulse bilaterally without bruit.   Abdomen:     Soft, non-tender   Extremities:   Extremities normal, atraumatic, no cyanosis or edema   Pulses:   2+ and symmetric all extremities   Skin:   Skin color, texture, turgor normal, no rashes or lesions       Diagnostic Data:  Procedures  Lab Results   Component Value Date    CHLPL 150 01/06/2021    TRIG 56 01/06/2021    HDL 57 01/06/2021     Lab Results   Component Value Date    GLUCOSE 127 (H) 01/18/2019    BUN 7 (L) 01/18/2019    CREATININE 0.81 01/18/2019     01/18/2019    K 4.3 01/18/2019     01/18/2019    CO2 29.0 01/18/2019     Lab Results   Component Value Date    HGBA1C 5.9 01/06/2021     Lab Results   Component Value Date    WBC 4.5 01/06/2021    HGB 16.4 01/06/2021    HCT 50.1 01/06/2021     01/06/2021       Allergies  Allergies   Allergen Reactions   • Metoprolol Shortness Of Breath   • Cefdinir Unknown - Low Severity     Hallucination, weird dreams   • Cetirizine      Insomnia   • Doxycycline Diarrhea     Stated he thought he was going to die. Bloody diarrhea and sores on his legs.   • Fluoxetine      Dizziness and stomach pain   • Oxycodone-Acetaminophen Other (See Comments)   • Plavix [Clopidogrel Bisulfate] Other (See Comments)     Dyspepsia     • Promethazine      itching   • Rosuvastatin Other (See Comments)     Myalgias   • Sulfa Antibiotics      Oral blisters, diarrhea   • Varenicline Swelling     Throat swelling   • Penicillins Rash       Current Medications    Current Outpatient Medications:    •  albuterol (PROVENTIL HFA;VENTOLIN HFA) 108 (90 Base) MCG/ACT inhaler, Inhale 2 puffs Every 4 (Four) Hours As Needed for Wheezing., Disp: , Rfl:   •  ALPRAZolam (XANAX) 1 MG tablet, Take 1 mg by mouth 2 (Two) Times a Day As Needed for Anxiety., Disp: , Rfl:   •  aspirin  MG tablet, Take 325 mg by mouth Daily. 1-3 tabs daily prn, Disp: , Rfl:   •  citalopram (CeleXA) 40 MG tablet, Take 40 mg by mouth Daily., Disp: , Rfl: 5  •  cyclobenzaprine (FLEXERIL) 10 MG tablet, Take 10 mg by mouth Daily., Disp: , Rfl:   •  fluticasone (FLONASE) 50 MCG/ACT nasal spray, 2 sprays into the nostril(s) as directed by provider As Needed., Disp: , Rfl:   •  Fluticasone Furoate-Vilanterol (Breo Ellipta) 100-25 MCG/INH inhaler, Inhale 1 puff Daily., Disp: , Rfl:   •  fluticasone-salmeterol (Advair Diskus) 250-50 MCG/DOSE DISKUS, Inhale 1 puff 2 (Two) Times a Day., Disp: , Rfl:   •  gabapentin (NEURONTIN) 600 MG tablet, Take 600 mg by mouth 4 (Four) Times a Day., Disp: , Rfl:   •  HYDROcodone-acetaminophen (NORCO)  MG per tablet, Take 1 tablet by mouth 4 (Four) Times a Day As Needed for Moderate Pain ., Disp: , Rfl:   •  Lido-Menthol-Methyl Sal-Camph (CBD KINGS EX), Apply  topically to the appropriate area as directed As Needed., Disp: , Rfl:   •  nitroglycerin (NITROSTAT) 0.4 MG SL tablet, DISSOLVE 1 TABLET UNDER THE TONGUE AS NEEDED FOR CHEST PAIN. MAY REPEAT FOR 3 DOSES AT 5 MINUTE INTERVALS. IF NO RELIEF GO TO ER., Disp: 25 tablet, Rfl: 1  •  pravastatin (PRAVACHOL) 40 MG tablet, Take 40 mg by mouth Daily., Disp: , Rfl:   •  tamsulosin (FLOMAX) 0.4 MG capsule 24 hr capsule, Take 1 capsule by mouth Every Night., Disp: , Rfl:   •  traZODone (DESYREL) 100 MG tablet, Take 100 mg by mouth Every Night. 2 tabs at night, Disp: , Rfl:   •  gabapentin (NEURONTIN) 800 MG tablet, Take 600 mg by mouth 4 (Four) Times a Day., Disp: , Rfl:   •  guaiFENesin (MUCINEX) 600 MG 12 hr tablet, Take 1,200 mg by mouth 2 (Two) Times a Day As  Needed for Cough., Disp: , Rfl:           ROS  Review of Systems   Cardiovascular: Positive for dyspnea on exertion and irregular heartbeat. Negative for chest pain and leg swelling.   Respiratory: Positive for cough, shortness of breath and snoring. Negative for wheezing.    Neurological: Positive for dizziness.          SOCIAL HX  Social History     Socioeconomic History   • Marital status:      Spouse name: Not on file   • Number of children: Not on file   • Years of education: Not on file   • Highest education level: Not on file   Tobacco Use   • Smoking status: Current Every Day Smoker     Packs/day: 0.00     Types: Cigars   • Smokeless tobacco: Never Used   • Tobacco comment: 3 cigars daily   Substance and Sexual Activity   • Alcohol use: No   • Drug use: No   • Sexual activity: Defer       FAMILY HX  Family History   Problem Relation Age of Onset   • Diabetes Father    • Heart disease Father    • Stroke Brother    • Coronary artery disease Other    • Diabetes Other    • Diabetes Mother    • Leukemia Sister    • Heart failure Sister    • Leukemia Daughter              Derek Jackson III, MD, FACC

## 2021-03-18 ENCOUNTER — OFFICE VISIT (OUTPATIENT)
Dept: PRIMARY CARE CLINIC | Age: 71
End: 2021-03-18
Payer: MEDICARE

## 2021-03-18 VITALS
SYSTOLIC BLOOD PRESSURE: 110 MMHG | OXYGEN SATURATION: 93 % | HEIGHT: 67 IN | TEMPERATURE: 96.9 F | WEIGHT: 122 LBS | DIASTOLIC BLOOD PRESSURE: 60 MMHG | HEART RATE: 89 BPM | BODY MASS INDEX: 19.15 KG/M2 | RESPIRATION RATE: 16 BRPM

## 2021-03-18 DIAGNOSIS — R63.4 WEIGHT LOSS: Primary | ICD-10-CM

## 2021-03-18 DIAGNOSIS — J44.1 CHRONIC OBSTRUCTIVE PULMONARY DISEASE WITH ACUTE EXACERBATION (HCC): ICD-10-CM

## 2021-03-18 DIAGNOSIS — R05.9 COUGH: ICD-10-CM

## 2021-03-18 DIAGNOSIS — F41.9 ANXIETY: ICD-10-CM

## 2021-03-18 DIAGNOSIS — Z12.11 COLON CANCER SCREENING: ICD-10-CM

## 2021-03-18 PROCEDURE — 3023F SPIROM DOC REV: CPT | Performed by: NURSE PRACTITIONER

## 2021-03-18 PROCEDURE — G8926 SPIRO NO PERF OR DOC: HCPCS | Performed by: NURSE PRACTITIONER

## 2021-03-18 PROCEDURE — 99214 OFFICE O/P EST MOD 30 MIN: CPT | Performed by: NURSE PRACTITIONER

## 2021-03-18 PROCEDURE — 4040F PNEUMOC VAC/ADMIN/RCVD: CPT | Performed by: NURSE PRACTITIONER

## 2021-03-18 PROCEDURE — G8482 FLU IMMUNIZE ORDER/ADMIN: HCPCS | Performed by: NURSE PRACTITIONER

## 2021-03-18 PROCEDURE — 1123F ACP DISCUSS/DSCN MKR DOCD: CPT | Performed by: NURSE PRACTITIONER

## 2021-03-18 PROCEDURE — G8427 DOCREV CUR MEDS BY ELIG CLIN: HCPCS | Performed by: NURSE PRACTITIONER

## 2021-03-18 PROCEDURE — 1036F TOBACCO NON-USER: CPT | Performed by: NURSE PRACTITIONER

## 2021-03-18 PROCEDURE — 3017F COLORECTAL CA SCREEN DOC REV: CPT | Performed by: NURSE PRACTITIONER

## 2021-03-18 PROCEDURE — G8420 CALC BMI NORM PARAMETERS: HCPCS | Performed by: NURSE PRACTITIONER

## 2021-03-18 ASSESSMENT — ENCOUNTER SYMPTOMS
ABDOMINAL PAIN: 0
EYE ITCHING: 0
EYE REDNESS: 0
COUGH: 0
DIARRHEA: 0
CONSTIPATION: 0
SORE THROAT: 0
SHORTNESS OF BREATH: 0
VOMITING: 0
NAUSEA: 0
RHINORRHEA: 0
EYE DISCHARGE: 0

## 2021-03-18 NOTE — PROGRESS NOTES
Have you seen any other physician or provider since your last visit? No    Have you had any other diagnostic tests since your last visit? No    Have you changed or stopped any medications since your last visit? No     Eye: pt will sched own appt    SUBJECTIVE:    Patient ID: Rakel Smith is a 69 y.o. male. Medical History Review  Past Medical, Family, and Social History reviewed. Health Maintenance Due   Topic Date Due    AAA screen  Never done    Hepatitis C screen  Never done    Diabetic foot exam  Never done    Diabetic retinal exam  Never done    Shingles Vaccine (1 of 2) Never done    Low dose CT lung screening  Never done    Annual Wellness Visit (AWV)  Never done       HPI:   Chief Complaint   Patient presents with    Hyperlipidemia    Gastroesophageal Reflux    Anxiety     Pt f/u on hyperlipidemia, GERD and anxiety. He saw cardiology. He is going to do a carotid US in September. He had both COVID shots. The second one caused him to feel really bad. He states he thought he was \"leaving here\". He states he has a good appetite. He says that he is cooking and eating well. Bowels are moving normally. He started having a lot of cramps \"all over\"- legs, back, stomach. Patient's medications, allergies, past medical, surgical, social and family histories were reviewed and updated as appropriate. Review of Systems   Constitutional: Negative for chills, fatigue and fever. HENT: Negative for congestion, ear pain, rhinorrhea and sore throat. Eyes: Negative for discharge, redness and itching. Respiratory: Negative for cough and shortness of breath. Cardiovascular: Negative for chest pain, palpitations and leg swelling. Gastrointestinal: Negative for abdominal pain, constipation, diarrhea, nausea and vomiting. Endocrine: Negative for cold intolerance and heat intolerance. Genitourinary: Negative for dysuria. Musculoskeletal: Negative for arthralgias and joint swelling. Skin: Negative for rash and wound. Neurological: Negative for weakness and headaches. Hematological: Negative for adenopathy. Psychiatric/Behavioral: Negative for dysphoric mood and sleep disturbance. The patient is not nervous/anxious. Reviewed and acurate. See MA note. OBJECTIVE:  /60 (Site: Left Upper Arm, Position: Sitting)   Pulse 89   Temp 96.9 °F (36.1 °C) (Temporal)   Resp 16   Ht 5' 7\" (1.702 m)   Wt 122 lb (55.3 kg)   SpO2 93% Comment: room air  BMI 19.11 kg/m²    Physical Exam  Constitutional:       Appearance: He is well-developed. HENT:      Head: Normocephalic and atraumatic. Right Ear: External ear normal.      Left Ear: External ear normal.   Eyes:      Conjunctiva/sclera: Conjunctivae normal.      Pupils: Pupils are equal, round, and reactive to light. Neck:      Musculoskeletal: Neck supple. Thyroid: No thyromegaly. Vascular: No JVD. Cardiovascular:      Rate and Rhythm: Normal rate and regular rhythm. Heart sounds: Normal heart sounds. No murmur. No friction rub. No gallop. Pulmonary:      Effort: Pulmonary effort is normal. No respiratory distress. Breath sounds: Normal breath sounds. Abdominal:      General: Bowel sounds are normal. There is no distension. Palpations: Abdomen is soft. Tenderness: There is no abdominal tenderness. Musculoskeletal: Normal range of motion. Lymphadenopathy:      Cervical: No cervical adenopathy. Skin:     General: Skin is warm and dry. Neurological:      Mental Status: He is alert and oriented to person, place, and time. Cranial Nerves: No cranial nerve deficit. No results found for requested labs within last 30 days.      Hemoglobin A1C (%)   Date Value   01/06/2021 5.9     Microalbumin, Random Urine (mg/dL)   Date Value   10/02/2020 <1.20     LDL Calculated (mg/dL)   Date Value   01/06/2021 82       Lab Results   Component Value Date    WBC 4.5 01/06/2021    NEUTROABS 2.5 01/06/2021    HGB 16.4 01/06/2021    HCT 50.1 01/06/2021    .6 (H) 01/06/2021     01/06/2021     Lab Results   Component Value Date    TSH 2.82 01/06/2021       Prior to Visit Medications    Medication Sig Taking? Authorizing Provider   ALPRAZolam (XANAX) 1 MG tablet TAKE 1/2-1 TABLET BY MOUTH THREE TIMES A DAY AS NEEDED FOR SLEEP Yes VIKI Moss   pravastatin (PRAVACHOL) 40 MG tablet TAKE 1 AND 1/2 TABLETS BY MOUTH EVERY DAY Yes VIKI Moss   fluticasone-vilanterol (BREO ELLIPTA) 100-25 MCG/INH AEPB inhaler Inhale 1 puff into the lungs daily Yes VIKI Moss   PROAIR  (56 Base) MCG/ACT inhaler INHALE 2 PUFFS BY MOUTH EVERY 6 HOURS AS NEEDED FOR WHEEZING Yes VIKI Moss   traZODone (DESYREL) 100 MG tablet TAKE TWO TABLETS BY MOUTH NIGHTLY FOR SLEEP Yes VIKI Moss   fluticasone (FLONASE) 50 MCG/ACT nasal spray USE 2 SPRAYS IN EACH NOSTRIL EVERY DAY Yes VIKI Moss   citalopram (CELEXA) 40 MG tablet TAKE ONE TABLET BY MOUTH EVERY DAY Yes VIKI Moss   tamsulosin (FLOMAX) 0.4 MG capsule TAKE ONE CAPSULE BY MOUTH EVERY EVENING AT BEDTIME FOR PROSTATE Yes VIKI Moss   NONFORMULARY CBD oil Yes Historical Provider, MD   blood glucose monitor strips Use to test blood sugar once daily Yes VIKI Moss   BRILINTA 90 MG TABS tablet Take 1 tablet by mouth 2 times daily 340 B program Yes VIKI Moss   gabapentin (NEURONTIN) 800 MG tablet Take 800 mg by mouth 4 times daily. Yes Historical Provider, MD   HYDROcodone-acetaminophen (NORCO)  MG per tablet Norco 10 mg-325 mg tablet   Take 1 tablet 4 times a day by oral route as directed for 30 days. Yes Historical Provider, MD   cyclobenzaprine (FLEXERIL) 10 MG tablet Take 10 mg by mouth daily Yes Historical Provider, MD       ASSESSMENT:  1. Weight loss    2. Colon cancer screening    3. Cough    4.  Chronic obstructive pulmonary disease with acute exacerbation ([facility] [address] 5. Anxiety        PLAN:    Orders Placed This Encounter   Procedures    XR CHEST STANDARD (2 VW)    Amb External Referral To General Surgery     Patient Instructions   · Keep a list of your medicines with you. List all of the prescription medicines, nonprescription medicines, supplements, natural remedies, and vitamins that you take. Tell your healthcare providers who treat you about all of the products you are taking. Your provider can provide you with a form to keep track of them. Just ask. · Follow the directions that come with your medicine, including information about food or alcohol. Make sure you know how and when to take your medicine. Do not take more or less than you are supposed to take. · Keep all medicines out of the reach of children. · Store medicines according to the directions on the label. · Monitor yourself. Learn to know how your body reacts to your new medicine and keep track of how it makes you feel before attempting (If your provider has allowed you to do so) to drive or go to work. · Seek emergency medical attention if you think you have used too much of this medicine. An overdose of any prescription medicine can be fatal. Overdose symptoms may include extreme drowsiness, muscle weakness, confusion, cold and clammy skin, pinpoint pupils, shallow breathing, slow heart rate, fainting, or coma. · Don't share prescription medicines with others, even when they seem to have the same symptoms. What may be good for you may be harmful to others. · If you are no longer taking a prescribed medication and you have pills left please take your pills out of their original containers. Mix crushed pills with an undesirable substance, such as cat litter or used coffee grounds. Put the mixture into a disposable container with a lid, such as an empty margarine tub, or into a sealable bag.   Cover up or remove any of your personal information on the empty containers by covering it with black permanent marker or duct tape. Place the sealed container with the mixture, and the empty drug containers, in the trash. · If you use a medication that is in the form of a patch, dispose of used patches by folding them in half so that the sticky sides meet, and then flushing them down a toilet. They should not be placed in the household trash where children or pets can find them. · If you have any questions, ask your provider or pharmacist for more information. · Be sure to keep all appointments for provider visits or tests. We are committed to providing you with the best care possible. In order to help us achieve these goals please remember to bring all medications, herbal products, and over the counter supplements with you to each visit. If your provider has ordered testing for you, please be sure to follow up with our office if you have not received results within 7 days after the testing took place. *If you receive a survey after visiting one of our offices, please take time to share your experience concerning your physician office visit. These surveys are confidential and no health information about you is shared. We are eager to improve for you and we are counting on your feedback to help make that happen. Thank you for requesting your Continuity of Care Document (CCD) electronically. Please follow the instructions below to securely access your online medical record. MyChart allows you to send messages to your doctor, view your test results, renew your prescriptions, schedule appointments, and more. How Do I Access my CCD? In your Internet browser, go to [email or web address] Enter your user name and password   Click on My medical Record  --> Download Summary --> Enter Password --> Download --> Save or Open Document    Additional Information  If you have questions, please contact your physician practice where you receive care.  Remember, MyChart is NOT to be used for urgent needs. For medical emergencies, dial 911. Return in about 2 months (around 5/18/2021). Alberto Barthel, CMA am scribing for and in the presence of VIKI Johnson on 3/29/2021 at 9:35 PM.      I, Cheryl DREW, personally performed the services described in the documentation as scribed by Chastity Brito CMA, in my presence and it is both accurate and complete.

## 2021-03-18 NOTE — PATIENT INSTRUCTIONS
· Keep a list of your medicines with you. List all of the prescription medicines, nonprescription medicines, supplements, natural remedies, and vitamins that you take. Tell your healthcare providers who treat you about all of the products you are taking. Your provider can provide you with a form to keep track of them. Just ask. · Follow the directions that come with your medicine, including information about food or alcohol. Make sure you know how and when to take your medicine. Do not take more or less than you are supposed to take. · Keep all medicines out of the reach of children. · Store medicines according to the directions on the label. · Monitor yourself. Learn to know how your body reacts to your new medicine and keep track of how it makes you feel before attempting (If your provider has allowed you to do so) to drive or go to work. · Seek emergency medical attention if you think you have used too much of this medicine. An overdose of any prescription medicine can be fatal. Overdose symptoms may include extreme drowsiness, muscle weakness, confusion, cold and clammy skin, pinpoint pupils, shallow breathing, slow heart rate, fainting, or coma. · Don't share prescription medicines with others, even when they seem to have the same symptoms. What may be good for you may be harmful to others. · If you are no longer taking a prescribed medication and you have pills left please take your pills out of their original containers. Mix crushed pills with an undesirable substance, such as cat litter or used coffee grounds. Put the mixture into a disposable container with a lid, such as an empty margarine tub, or into a sealable bag. Cover up or remove any of your personal information on the empty containers by covering it with black permanent marker or duct tape. Place the sealed container with the mixture, and the empty drug containers, in the trash.    · If you use a medication that is in the form of a patch, dispose of used patches by folding them in half so that the sticky sides meet, and then flushing them down a toilet. They should not be placed in the household trash where children or pets can find them. · If you have any questions, ask your provider or pharmacist for more information. · Be sure to keep all appointments for provider visits or tests. We are committed to providing you with the best care possible. In order to help us achieve these goals please remember to bring all medications, herbal products, and over the counter supplements with you to each visit. If your provider has ordered testing for you, please be sure to follow up with our office if you have not received results within 7 days after the testing took place. *If you receive a survey after visiting one of our offices, please take time to share your experience concerning your physician office visit. These surveys are confidential and no health information about you is shared. We are eager to improve for you and we are counting on your feedback to help make that happen. Thank you for requesting your Continuity of Care Document (CCD) electronically. Please follow the instructions below to securely access your online medical record. [employer] allows you to send messages to your doctor, view your test results, renew your prescriptions, schedule appointments, and more. How Do I Access my CCD? In your Internet browser, go to [email or web address] Enter your user name and password   Click on My medical Record  --> Download Summary --> Enter Password --> Download --> Save or Open Document    Additional Information  If you have questions, please contact your physician practice where you receive care. Remember, [facility] is NOT to be used for urgent needs. For medical emergencies, dial 911.

## 2021-03-29 ENCOUNTER — HOSPITAL ENCOUNTER (OUTPATIENT)
Facility: HOSPITAL | Age: 71
Discharge: HOME | End: 2021-03-29
Payer: MEDICARE

## 2021-03-29 ENCOUNTER — HOSPITAL ENCOUNTER (OUTPATIENT)
Dept: GENERAL RADIOLOGY | Facility: HOSPITAL | Age: 71
Discharge: HOME | End: 2021-03-29
Payer: MEDICARE

## 2021-03-29 DIAGNOSIS — R63.4 WEIGHT LOSS: ICD-10-CM

## 2021-03-29 DIAGNOSIS — R05.9 COUGH: ICD-10-CM

## 2021-03-29 PROCEDURE — 71046 X-RAY EXAM CHEST 2 VIEWS: CPT

## 2021-03-31 DIAGNOSIS — F41.9 ANXIETY: ICD-10-CM

## 2021-03-31 RX ORDER — ALPRAZOLAM 1 MG/1
TABLET ORAL
Qty: 75 TABLET | Refills: 0 | Status: SHIPPED | OUTPATIENT
Start: 2021-03-31 | End: 2021-04-30

## 2021-04-29 RX ORDER — CITALOPRAM 40 MG/1
TABLET ORAL
Qty: 30 TABLET | Refills: 5 | Status: SHIPPED | OUTPATIENT
Start: 2021-04-29

## 2021-04-29 RX ORDER — TAMSULOSIN HYDROCHLORIDE 0.4 MG/1
CAPSULE ORAL
Qty: 30 CAPSULE | Refills: 5 | Status: SHIPPED | OUTPATIENT
Start: 2021-04-29 | End: 2021-05-27

## 2021-04-29 RX ORDER — TRAZODONE HYDROCHLORIDE 100 MG/1
TABLET ORAL
Qty: 60 TABLET | Refills: 5 | Status: SHIPPED | OUTPATIENT
Start: 2021-04-29 | End: 2021-05-19 | Stop reason: SDUPTHER

## 2021-04-30 DIAGNOSIS — F41.9 ANXIETY: ICD-10-CM

## 2021-04-30 RX ORDER — ALPRAZOLAM 1 MG/1
TABLET ORAL
Qty: 75 TABLET | Refills: 0 | Status: SHIPPED | OUTPATIENT
Start: 2021-04-30 | End: 2021-05-25

## 2021-05-19 ENCOUNTER — OFFICE VISIT (OUTPATIENT)
Dept: PRIMARY CARE CLINIC | Age: 71
End: 2021-05-19
Payer: MEDICARE

## 2021-05-19 ENCOUNTER — HOSPITAL ENCOUNTER (OUTPATIENT)
Facility: HOSPITAL | Age: 71
Discharge: HOME | End: 2021-05-19
Payer: MEDICARE

## 2021-05-19 VITALS
DIASTOLIC BLOOD PRESSURE: 58 MMHG | OXYGEN SATURATION: 88 % | TEMPERATURE: 97.1 F | WEIGHT: 122.2 LBS | HEART RATE: 87 BPM | BODY MASS INDEX: 19.14 KG/M2 | SYSTOLIC BLOOD PRESSURE: 124 MMHG

## 2021-05-19 DIAGNOSIS — E11.9 TYPE 2 DIABETES MELLITUS WITHOUT COMPLICATION, WITHOUT LONG-TERM CURRENT USE OF INSULIN (HCC): ICD-10-CM

## 2021-05-19 DIAGNOSIS — F41.9 ANXIETY: ICD-10-CM

## 2021-05-19 DIAGNOSIS — J44.1 COPD EXACERBATION (HCC): Primary | ICD-10-CM

## 2021-05-19 DIAGNOSIS — G47.00 INSOMNIA, UNSPECIFIED TYPE: ICD-10-CM

## 2021-05-19 DIAGNOSIS — R63.4 WEIGHT LOSS: ICD-10-CM

## 2021-05-19 LAB
A/G RATIO: 1.7 (ref 0.8–2)
ALBUMIN SERPL-MCNC: 4.3 G/DL (ref 3.4–4.8)
ALP BLD-CCNC: 93 U/L (ref 25–100)
ALT SERPL-CCNC: 19 U/L (ref 4–36)
ANION GAP SERPL CALCULATED.3IONS-SCNC: 8 MMOL/L (ref 3–16)
AST SERPL-CCNC: 19 U/L (ref 8–33)
BILIRUB SERPL-MCNC: 0.5 MG/DL (ref 0.3–1.2)
BUN BLDV-MCNC: 14 MG/DL (ref 6–20)
CALCIUM SERPL-MCNC: 9.8 MG/DL (ref 8.5–10.5)
CHLORIDE BLD-SCNC: 100 MMOL/L (ref 98–107)
CO2: 33 MMOL/L (ref 20–30)
CREAT SERPL-MCNC: 0.9 MG/DL (ref 0.4–1.2)
GFR AFRICAN AMERICAN: >59
GFR NON-AFRICAN AMERICAN: >59
GLOBULIN: 2.6 G/DL
GLUCOSE BLD-MCNC: 98 MG/DL (ref 74–106)
HBA1C MFR BLD: 5.9 %
POTASSIUM SERPL-SCNC: 4.6 MMOL/L (ref 3.4–5.1)
SODIUM BLD-SCNC: 141 MMOL/L (ref 136–145)
TOTAL PROTEIN: 6.9 G/DL (ref 6.4–8.3)

## 2021-05-19 PROCEDURE — 3044F HG A1C LEVEL LT 7.0%: CPT | Performed by: NURSE PRACTITIONER

## 2021-05-19 PROCEDURE — G8420 CALC BMI NORM PARAMETERS: HCPCS | Performed by: NURSE PRACTITIONER

## 2021-05-19 PROCEDURE — 80053 COMPREHEN METABOLIC PANEL: CPT

## 2021-05-19 PROCEDURE — 96372 THER/PROPH/DIAG INJ SC/IM: CPT | Performed by: NURSE PRACTITIONER

## 2021-05-19 PROCEDURE — G8926 SPIRO NO PERF OR DOC: HCPCS | Performed by: NURSE PRACTITIONER

## 2021-05-19 PROCEDURE — 3023F SPIROM DOC REV: CPT | Performed by: NURSE PRACTITIONER

## 2021-05-19 PROCEDURE — 1123F ACP DISCUSS/DSCN MKR DOCD: CPT | Performed by: NURSE PRACTITIONER

## 2021-05-19 PROCEDURE — G8427 DOCREV CUR MEDS BY ELIG CLIN: HCPCS | Performed by: NURSE PRACTITIONER

## 2021-05-19 PROCEDURE — 83036 HEMOGLOBIN GLYCOSYLATED A1C: CPT

## 2021-05-19 PROCEDURE — 3017F COLORECTAL CA SCREEN DOC REV: CPT | Performed by: NURSE PRACTITIONER

## 2021-05-19 PROCEDURE — 1036F TOBACCO NON-USER: CPT | Performed by: NURSE PRACTITIONER

## 2021-05-19 PROCEDURE — 99213 OFFICE O/P EST LOW 20 MIN: CPT | Performed by: NURSE PRACTITIONER

## 2021-05-19 PROCEDURE — 2022F DILAT RTA XM EVC RTNOPTHY: CPT | Performed by: NURSE PRACTITIONER

## 2021-05-19 PROCEDURE — 4040F PNEUMOC VAC/ADMIN/RCVD: CPT | Performed by: NURSE PRACTITIONER

## 2021-05-19 RX ORDER — DEXAMETHASONE SODIUM PHOSPHATE 10 MG/ML
10 INJECTION INTRAMUSCULAR; INTRAVENOUS ONCE
Status: COMPLETED | OUTPATIENT
Start: 2021-05-19 | End: 2021-05-19

## 2021-05-19 RX ORDER — AZITHROMYCIN 250 MG/1
TABLET, FILM COATED ORAL
Qty: 6 TABLET | Refills: 0 | Status: SHIPPED | OUTPATIENT
Start: 2021-05-19 | End: 2021-05-29

## 2021-05-19 RX ORDER — TRAZODONE HYDROCHLORIDE 100 MG/1
TABLET ORAL
Qty: 60 TABLET | Refills: 5 | Status: SHIPPED | OUTPATIENT
Start: 2021-05-19

## 2021-05-19 RX ADMIN — DEXAMETHASONE SODIUM PHOSPHATE 10 MG: 10 INJECTION INTRAMUSCULAR; INTRAVENOUS at 12:10

## 2021-05-19 ASSESSMENT — ENCOUNTER SYMPTOMS
WHEEZING: 1
SORE THROAT: 0
NAUSEA: 0
VOMITING: 0
SHORTNESS OF BREATH: 0
EYE PAIN: 0
COUGH: 1
ABDOMINAL PAIN: 0

## 2021-05-19 NOTE — PROGRESS NOTES
SUBJECTIVE:    Patient ID: Vera Simpson is a 69 y.o. male. Medical History Review  Past Medical, Family, and Social History reviewed. Health Maintenance Due   Topic Date Due    AAA screen  Never done    Hepatitis C screen  Never done    Diabetic foot exam  Never done    Diabetic retinal exam  Never done    Shingles Vaccine (1 of 2) Never done    Low dose CT lung screening  Never done    Annual Wellness Visit (AWV)  Never done       HPI:   Chief Complaint   Patient presents with    Hyperlipidemia    Cough    Chest Congestion   He states he eats good most of the time. He has been coughing and wheezing. No fever. He has not tried to lose weight. He does no have transportation to get a colonoscopy done at this time. Patient's medications, allergies, past medical, surgical, social and family histories were reviewed and updated as appropriate. Review of Systems   Constitutional: Negative for chills and fever. HENT: Negative for ear pain and sore throat. Eyes: Negative for pain and visual disturbance. Respiratory: Positive for cough and wheezing. Negative for shortness of breath. Cardiovascular: Negative for chest pain, palpitations and leg swelling. Gastrointestinal: Negative for abdominal pain, nausea and vomiting. Genitourinary: Negative for dysuria and hematuria. Musculoskeletal: Negative for joint swelling. Skin: Negative for rash. Neurological: Negative for dizziness and weakness. Psychiatric/Behavioral: Negative for sleep disturbance. Reviewed and acurate. See MA note. OBJECTIVE:  BP (!) 124/58 (Site: Right Upper Arm, Position: Sitting, Cuff Size: Medium Adult)   Pulse 87   Temp 97.1 °F (36.2 °C) (Temporal)   Wt 122 lb 3.2 oz (55.4 kg)   SpO2 (!) 88% Comment: room air  BMI 19.14 kg/m²    Physical Exam  Vitals reviewed. Constitutional:       General: He is not in acute distress. Appearance: He is well-developed. HENT:      Head: Normocephalic. Mouth/Throat:      Pharynx: No oropharyngeal exudate. Eyes:      General: Lids are normal.   Cardiovascular:      Rate and Rhythm: Normal rate and regular rhythm. Heart sounds: Normal heart sounds. Pulmonary:      Effort: Pulmonary effort is normal.      Comments: Slight insp wheeze  Abdominal:      General: Bowel sounds are normal. There is no distension. Palpations: Abdomen is soft. Tenderness: There is no abdominal tenderness. Musculoskeletal:      Cervical back: Neck supple. Lymphadenopathy:      Cervical: No cervical adenopathy. Skin:     General: Skin is warm and dry. Neurological:      Mental Status: He is alert and oriented to person, place, and time.      Weight down 22lbs in a little over a year    Results in Past 30 Days  Result Component Current Result Ref Range Previous Result Ref Range   Albumin/Globulin Ratio 1.7 (5/19/2021) 0.8 - 2.0 Not in Time Range    Albumin 4.3 (5/19/2021) 3.4 - 4.8 g/dL Not in Time Range    Alkaline Phosphatase 93 (5/19/2021) 25 - 100 U/L Not in Time Range    ALT 19 (5/19/2021) 4 - 36 U/L Not in Time Range    AST 19 (5/19/2021) 8 - 33 U/L Not in Time Range    BUN 14 (5/19/2021) 6 - 20 mg/dL Not in Time Range    Calcium 9.8 (5/19/2021) 8.5 - 10.5 mg/dL Not in Time Range    Chloride 100 (5/19/2021) 98 - 107 mmol/L Not in Time Range    CO2 33 (H) (5/19/2021) 20 - 30 mmol/L Not in Time Range    CREATININE 0.9 (5/19/2021) 0.4 - 1.2 mg/dL Not in Time Range    GFR  >59 (5/19/2021) >59 Not in Time Range    GFR Non- >59 (5/19/2021) >59 Not in Time Range    Globulin 2.6 (5/19/2021) g/dL Not in Time Range    Glucose 98 (5/19/2021) 74 - 106 mg/dL Not in Time Range    Potassium 4.6 (5/19/2021) 3.4 - 5.1 mmol/L Not in Time Range    Sodium 141 (5/19/2021) 136 - 145 mmol/L Not in Time Range    Total Bilirubin 0.5 (5/19/2021) 0.3 - 1.2 mg/dL Not in Time Range    Total Protein 6.9 (5/19/2021) 6.4 - 8.3 g/dL Not in Time Range Hemoglobin A1C (%)   Date Value   05/19/2021 5.9     Microalbumin, Random Urine (mg/dL)   Date Value   10/02/2020 <1.20     LDL Calculated (mg/dL)   Date Value   01/06/2021 82       Lab Results   Component Value Date    WBC 4.5 01/06/2021    NEUTROABS 2.5 01/06/2021    HGB 16.4 01/06/2021    HCT 50.1 01/06/2021    .6 (H) 01/06/2021     01/06/2021     Lab Results   Component Value Date    TSH 2.82 01/06/2021       Prior to Visit Medications    Medication Sig Taking? Authorizing Provider   traZODone (DESYREL) 100 MG tablet TAKE TWO TABLETS BY MOUTH NIGHTLY FOR SLEEP Yes VIKI Hines   azithromycin (ZITHROMAX Z-STEPHENIE) 250 MG tablet As directed Yes VIKI Hines   ALPRAZolam (XANAX) 1 MG tablet TAKE 1/2-1 TABLET BY MOUTH THREE TIMES A DAY AS NEEDED FOR SLEEP Yes VIKI Hines   citalopram (CELEXA) 40 MG tablet TAKE ONE TABLET BY MOUTH EVERY DAY Yes VIKI Hines   tamsulosin (FLOMAX) 0.4 MG capsule TAKE ONE CAPSULE BY MOUTH EVERY EVENING AT BEDTIME FOR PROSTATE Yes VIKI iHnes   pravastatin (PRAVACHOL) 40 MG tablet TAKE 1 AND 1/2 TABLETS BY MOUTH EVERY DAY Yes VIKI Hines   PROAIR  (90 Base) MCG/ACT inhaler INHALE 2 PUFFS BY MOUTH EVERY 6 HOURS AS NEEDED FOR WHEEZING Yes VIKI Hines   fluticasone (FLONASE) 50 MCG/ACT nasal spray USE 2 SPRAYS IN EACH NOSTRIL EVERY DAY Yes VIKI Hines   NONFORMULARY CBD oil Yes Historical Provider, MD   blood glucose monitor strips Use to test blood sugar once daily Yes VIKI Hines   BRILINTA 90 MG TABS tablet Take 1 tablet by mouth 2 times daily 340 B program Yes VIKI Hines   gabapentin (NEURONTIN) 800 MG tablet Take 800 mg by mouth 4 times daily. Yes Historical Provider, MD   HYDROcodone-acetaminophen (NORCO)  MG per tablet Norco 10 mg-325 mg tablet   Take 1 tablet 4 times a day by oral route as directed for 30 days.  Yes Historical Provider, MD   cyclobenzaprine (FLEXERIL) 10 MG tablet Take 10 mg by mouth daily Yes Historical Provider, MD   fluticasone-vilanterol (BREO ELLIPTA) 100-25 MCG/INH AEPB inhaler Inhale 1 puff into the lungs daily  Patient not taking: Reported on 5/19/2021  VIKI Cifuentes         ASSESSMENT:  1. COPD exacerbation ([address]    2. Weight loss    3. Type 2 diabetes mellitus without complication, without long-term current use of insulin ([facility]    4. Anxiety    5. Insomnia, unspecified type        PLAN:  Orders Placed This Encounter   Medications    traZODone (DESYREL) 100 MG tablet     Sig: TAKE TWO TABLETS BY MOUTH NIGHTLY FOR SLEEP     Dispense:  60 tablet     Refill:  5    dexamethasone (DECADRON) injection 10 mg    azithromycin (ZITHROMAX Z-STEPHENIE) 250 MG tablet     Sig: As directed     Dispense:  6 tablet     Refill:  0     Orders Placed This Encounter   Procedures    CT ABDOMEN PELVIS W IV CONTRAST Additional Contrast? None    COMPREHENSIVE METABOLIC PANEL    HEMOGLOBIN A1C     Call if no improvement in a few days. F/U 2 months.

## 2021-05-19 NOTE — PROGRESS NOTES
Chief Complaint   Patient presents with    Hyperlipidemia    Cough    Chest Congestion       Have you seen any other physician or provider since your last visit yes - Pain Clinic    Have you had any other diagnostic tests since your last visit? no    Have you changed or stopped any medications since your last visit? Yes stopped Sujatha Tolliver is here for 2 month follow-up. He reports having a cough and some congestion for the last 2 weeks. After obtaining consent, and per orders of Kristie Stiles, APRN injection of Dexamethasone 10mg was given in Left upper quad. gluteus by Roxanna Seay. Patient instructed to remain in clinic for 20 minutes afterwards, and to report any adverse reaction to me immediately.

## 2021-05-24 DIAGNOSIS — F41.9 ANXIETY: ICD-10-CM

## 2021-05-25 RX ORDER — ALPRAZOLAM 1 MG/1
TABLET ORAL
Qty: 75 TABLET | Refills: 0 | Status: SHIPPED | OUTPATIENT
Start: 2021-05-25 | End: 2021-06-22

## 2021-05-27 ENCOUNTER — HOSPITAL ENCOUNTER (OUTPATIENT)
Dept: CT IMAGING | Facility: HOSPITAL | Age: 71
Discharge: HOME | End: 2021-05-27
Payer: MEDICARE

## 2021-05-27 DIAGNOSIS — R63.4 WEIGHT LOSS: ICD-10-CM

## 2021-05-27 PROCEDURE — 74177 CT ABD & PELVIS W/CONTRAST: CPT

## 2021-05-27 PROCEDURE — 6360000004 HC RX CONTRAST MEDICATION: Performed by: NURSE PRACTITIONER

## 2021-05-27 RX ORDER — TAMSULOSIN HYDROCHLORIDE 0.4 MG/1
CAPSULE ORAL
Qty: 30 CAPSULE | Refills: 5 | Status: SHIPPED | OUTPATIENT
Start: 2021-05-27

## 2021-05-27 RX ORDER — FLUTICASONE PROPIONATE 50 MCG
SPRAY, SUSPENSION (ML) NASAL
Qty: 16 G | Refills: 5 | Status: SHIPPED | OUTPATIENT
Start: 2021-05-27

## 2021-05-27 RX ORDER — ALBUTEROL SULFATE 90 UG/1
AEROSOL, METERED RESPIRATORY (INHALATION)
Qty: 8.5 G | Refills: 5 | Status: SHIPPED | OUTPATIENT
Start: 2021-05-27

## 2021-05-27 RX ADMIN — IOPAMIDOL 100 ML: 755 INJECTION, SOLUTION INTRAVENOUS at 08:25

## 2021-05-28 DIAGNOSIS — R93.5 ABNORMAL COMPUTED TOMOGRAPHY ANGIOGRAPHY (CTA) OF ABDOMEN AND PELVIS: Primary | ICD-10-CM

## 2021-05-28 DIAGNOSIS — N40.0 ENLARGED PROSTATE: ICD-10-CM

## 2021-06-01 ENCOUNTER — HOSPITAL ENCOUNTER (OUTPATIENT)
Facility: HOSPITAL | Age: 71
Discharge: HOME | End: 2021-06-01
Payer: MEDICARE

## 2021-06-01 DIAGNOSIS — N40.0 ENLARGED PROSTATE: ICD-10-CM

## 2021-06-01 DIAGNOSIS — N40.0 ENLARGED PROSTATE: Primary | ICD-10-CM

## 2021-06-01 LAB — PROSTATE SPECIFIC ANTIGEN: 0.75 NG/ML (ref 0–4)

## 2021-06-01 PROCEDURE — 84153 ASSAY OF PSA TOTAL: CPT

## 2021-06-21 DIAGNOSIS — F41.9 ANXIETY: ICD-10-CM

## 2021-06-22 RX ORDER — ALPRAZOLAM 1 MG/1
TABLET ORAL
Qty: 75 TABLET | Refills: 0 | Status: SHIPPED | OUTPATIENT
Start: 2021-06-22 | End: 2021-07-19 | Stop reason: SDUPTHER

## 2021-07-19 ENCOUNTER — OFFICE VISIT (OUTPATIENT)
Dept: PRIMARY CARE CLINIC | Age: 71
End: 2021-07-19
Payer: MEDICARE

## 2021-07-19 VITALS
BODY MASS INDEX: 19.67 KG/M2 | RESPIRATION RATE: 18 BRPM | TEMPERATURE: 97.8 F | WEIGHT: 125.6 LBS | SYSTOLIC BLOOD PRESSURE: 126 MMHG | DIASTOLIC BLOOD PRESSURE: 60 MMHG

## 2021-07-19 DIAGNOSIS — F41.9 ANXIETY: ICD-10-CM

## 2021-07-19 DIAGNOSIS — J44.1 CHRONIC OBSTRUCTIVE PULMONARY DISEASE WITH ACUTE EXACERBATION (HCC): ICD-10-CM

## 2021-07-19 DIAGNOSIS — E78.5 HYPERLIPIDEMIA, UNSPECIFIED HYPERLIPIDEMIA TYPE: Primary | ICD-10-CM

## 2021-07-19 DIAGNOSIS — I25.10 ASHD (ARTERIOSCLEROTIC HEART DISEASE): ICD-10-CM

## 2021-07-19 PROCEDURE — G8420 CALC BMI NORM PARAMETERS: HCPCS | Performed by: NURSE PRACTITIONER

## 2021-07-19 PROCEDURE — G8427 DOCREV CUR MEDS BY ELIG CLIN: HCPCS | Performed by: NURSE PRACTITIONER

## 2021-07-19 PROCEDURE — 1123F ACP DISCUSS/DSCN MKR DOCD: CPT | Performed by: NURSE PRACTITIONER

## 2021-07-19 PROCEDURE — G8926 SPIRO NO PERF OR DOC: HCPCS | Performed by: NURSE PRACTITIONER

## 2021-07-19 PROCEDURE — 3023F SPIROM DOC REV: CPT | Performed by: NURSE PRACTITIONER

## 2021-07-19 PROCEDURE — 99213 OFFICE O/P EST LOW 20 MIN: CPT | Performed by: NURSE PRACTITIONER

## 2021-07-19 PROCEDURE — 3017F COLORECTAL CA SCREEN DOC REV: CPT | Performed by: NURSE PRACTITIONER

## 2021-07-19 PROCEDURE — 4040F PNEUMOC VAC/ADMIN/RCVD: CPT | Performed by: NURSE PRACTITIONER

## 2021-07-19 PROCEDURE — 1036F TOBACCO NON-USER: CPT | Performed by: NURSE PRACTITIONER

## 2021-07-19 RX ORDER — ALPRAZOLAM 1 MG/1
TABLET ORAL
Qty: 75 TABLET | Refills: 0 | Status: SHIPPED | OUTPATIENT
Start: 2021-07-19 | End: 2021-08-16

## 2021-07-19 RX ORDER — GABAPENTIN 600 MG/1
TABLET ORAL
COMMUNITY
Start: 2021-06-28

## 2021-07-19 NOTE — PATIENT INSTRUCTIONS
Take a breathing treatment if you have a bad episode of smothering or if your oxygen drops below 90.

## 2021-07-19 NOTE — PROGRESS NOTES
Chief Complaint   Patient presents with    Hyperlipidemia    Gastroesophageal Reflux    Anxiety       Have you seen any other physician or provider since your last visit yes - pain clinic    Have you had any other diagnostic tests since your last visit? no    Have you changed or stopped any medications since your last visit? Looks like gabapentin dose lowered to 600 from 800 pain clinic      Pt has not had eye exam in last year     SUBJECTIVE:    Patient ID: Ana Cespedes is a 69 y.o. male. Medical History Review  Past Medical, Family, and Social History reviewed. Health Maintenance Due   Topic Date Due    Hepatitis C screen  Never done    Diabetic foot exam  Never done    Diabetic retinal exam  Never done    Shingles Vaccine (1 of 2) Never done    Low dose CT lung screening  Never done    Annual Wellness Visit (AWV)  Never done       HPI:   Chief Complaint   Patient presents with    Hyperlipidemia    Gastroesophageal Reflux    Anxiety   He had an episode of smothering after [employer]. His oxygen level was down in the low 80's. He has cut back on smoking. He has never had home oxygen. Declines pulmonology consult at this time. He sees cardiology in September. Patient's medications, allergies, past medical, surgical, social and family histories were reviewed and updated as appropriate. Review of Systems   Constitutional: Negative for chills and fever. HENT: Negative for ear pain and sore throat. Eyes: Negative for pain and visual disturbance. Respiratory: Positive for shortness of breath. Negative for cough. Cardiovascular: Negative for chest pain, palpitations and leg swelling. Gastrointestinal: Negative for abdominal pain, nausea and vomiting. Genitourinary: Negative for dysuria and hematuria. Musculoskeletal: Negative for joint swelling. Skin: Negative for rash. Neurological: Negative for dizziness and weakness.    Psychiatric/Behavioral: Negative for sleep disturbance. The patient is nervous/anxious. Reviewed and acurate. See MA note. OBJECTIVE:  /60   Temp 97.8 °F (36.6 °C)   Resp 18   Wt 125 lb 9.6 oz (57 kg)   BMI 19.67 kg/m²    Physical Exam  Vitals reviewed. Constitutional:       General: He is not in acute distress. Appearance: He is well-developed. HENT:      Head: Normocephalic. Mouth/Throat:      Pharynx: No oropharyngeal exudate. Eyes:      General: Lids are normal.   Cardiovascular:      Rate and Rhythm: Normal rate and regular rhythm. Heart sounds: Normal heart sounds. Pulmonary:      Effort: Pulmonary effort is normal.      Breath sounds: Normal breath sounds. Abdominal:      General: Bowel sounds are normal. There is no distension. Palpations: Abdomen is soft. Tenderness: There is no abdominal tenderness. Musculoskeletal:      Cervical back: Neck supple. Lymphadenopathy:      Cervical: No cervical adenopathy. Skin:     General: Skin is warm and dry. Neurological:      Mental Status: He is alert and oriented to person, place, and time.          Results in Past 30 Days  Result Component Current Result Ref Range Previous Result Ref Range   Albumin/Globulin Ratio 2.0 (8/10/2021) 0.8 - 2.0 Not in Time Range    Albumin 4.4 (8/10/2021) 3.4 - 4.8 g/dL Not in Time Range    Alkaline Phosphatase 64 (8/10/2021) 25 - 100 U/L Not in Time Range    ALT 22 (8/10/2021) 4 - 36 U/L Not in Time Range    AST 22 (8/10/2021) 8 - 33 U/L Not in Time Range    BUN 13 (8/10/2021) 6 - 20 mg/dL Not in Time Range    Calcium 9.4 (8/10/2021) 8.5 - 10.5 mg/dL Not in Time Range    Chloride 100 (8/10/2021) 98 - 107 mmol/L Not in Time Range    CO2 33 (H) (8/10/2021) 20 - 30 mmol/L Not in Time Range    CREATININE 1.1 (8/10/2021) 0.4 - 1.2 mg/dL Not in Time Range    GFR  >59 (8/10/2021) >59 Not in Time Range    GFR Non- >59 (8/10/2021) >59 Not in Time Range    Globulin 2.2 (8/10/2021) g/dL Not in Time Range    Glucose 113 (H) (8/10/2021) 74 - 106 mg/dL Not in Time Range    Potassium 4.9 (8/10/2021) 3.4 - 5.1 mmol/L Not in Time Range    Sodium 139 (8/10/2021) 136 - 145 mmol/L Not in Time Range    Total Bilirubin 0.5 (8/10/2021) 0.3 - 1.2 mg/dL Not in Time Range    Total Protein 6.6 (8/10/2021) 6.4 - 8.3 g/dL Not in Time Range      Hemoglobin A1C (%)   Date Value   05/19/2021 5.9     Microalbumin, Random Urine (mg/dL)   Date Value   10/02/2020 <1.20     LDL Calculated (mg/dL)   Date Value   08/10/2021 74       Lab Results   Component Value Date    WBC 4.5 01/06/2021    NEUTROABS 2.5 01/06/2021    HGB 16.4 01/06/2021    HCT 50.1 01/06/2021    .6 (H) 01/06/2021     01/06/2021     Lab Results   Component Value Date    TSH 2.82 01/06/2021       Prior to Visit Medications    Medication Sig Taking?  Authorizing Provider   tamsulosin (FLOMAX) 0.4 MG capsule TAKE ONE CAPSULE BY MOUTH EVERY EVENING AT BEDTIME FOR PROSTATE Yes VIKI Murillo   albuterol sulfate  (90 Base) MCG/ACT inhaler INHALE 2 PUFFS BY MOUTH EVERY 6 HOURS AS NEEDED FOR WHEEZING Yes VIKI Murillo   fluticasone (FLONASE) 50 MCG/ACT nasal spray USE 2 SPRAYS IN EACH NOSTRIL EVERY DAY Yes VIKI Murillo   traZODone (DESYREL) 100 MG tablet TAKE TWO TABLETS BY MOUTH NIGHTLY FOR SLEEP Yes VIKI Murillo   citalopram (CELEXA) 40 MG tablet TAKE ONE TABLET BY MOUTH EVERY DAY Yes VIKI Murillo   pravastatin (PRAVACHOL) 40 MG tablet TAKE 1 AND 1/2 TABLETS BY MOUTH EVERY DAY Yes VIKI Murillo   fluticasone-vilanterol (BREO ELLIPTA) 100-25 MCG/INH AEPB inhaler Inhale 1 puff into the lungs daily Yes VIKI Murillo   NONFORMULARY CBD oil Yes Historical Provider, MD   blood glucose monitor strips Use to test blood sugar once daily Yes VIKI Murillo   BRILINTA 90 MG TABS tablet Take 1 tablet by mouth 2 times daily 340 B program Yes VIKI Murillo   HYDROcodone-acetaminophen (NORCO)  MG per tablet Norco 10 mg-325 mg tablet   Take 1 tablet 4 times a day by oral route as directed for 30 days. Yes Historical Provider, MD   cyclobenzaprine (FLEXERIL) 10 MG tablet Take 10 mg by mouth daily Yes Historical Provider, MD   ALPRAZolam (XANAX) 1 MG tablet TAKE 1/2 - 1 TABLET BY MOUTH 3 TIMES A DAY AS NEEDED FOR SLEEP  Sandie Olsen MD   gabapentin (NEURONTIN) 600 MG tablet TAKE ONE TABLET BY MOUTH FOUR TIMES A DAY  Historical Provider, MD       ASSESSMENT:  1. Hyperlipidemia, unspecified hyperlipidemia type    2. Anxiety    3. Chronic obstructive pulmonary disease with acute exacerbation ([facility]    4. ASHD (arteriosclerotic heart disease)        PLAN:  Orders Placed This Encounter   Medications    DISCONTD: ALPRAZolam (XANAX) 1 MG tablet     Sig: Take 1/2 - 1 tablet by mouth 3 times a day as needed for sleep     Dispense:  75 tablet     Refill:  0     Orders Placed This Encounter   Procedures    COMPREHENSIVE METABOLIC PANEL    LIPID PANEL     Patient Instructions   Take a breathing treatment if you have a bad episode of smothering or if your oxygen drops below 90. F/U 2 months.

## 2021-08-10 ENCOUNTER — HOSPITAL ENCOUNTER (OUTPATIENT)
Facility: HOSPITAL | Age: 71
Discharge: HOME | End: 2021-08-10
Payer: MEDICARE

## 2021-08-10 DIAGNOSIS — E78.5 HYPERLIPIDEMIA, UNSPECIFIED HYPERLIPIDEMIA TYPE: ICD-10-CM

## 2021-08-10 LAB
A/G RATIO: 2 (ref 0.8–2)
ALBUMIN SERPL-MCNC: 4.4 G/DL (ref 3.4–4.8)
ALP BLD-CCNC: 64 U/L (ref 25–100)
ALT SERPL-CCNC: 22 U/L (ref 4–36)
ANION GAP SERPL CALCULATED.3IONS-SCNC: 6 MMOL/L (ref 3–16)
AST SERPL-CCNC: 22 U/L (ref 8–33)
BILIRUB SERPL-MCNC: 0.5 MG/DL (ref 0.3–1.2)
BUN BLDV-MCNC: 13 MG/DL (ref 6–20)
CALCIUM SERPL-MCNC: 9.4 MG/DL (ref 8.5–10.5)
CHLORIDE BLD-SCNC: 100 MMOL/L (ref 98–107)
CHOLESTEROL, TOTAL: 148 MG/DL (ref 0–200)
CO2: 33 MMOL/L (ref 20–30)
CREAT SERPL-MCNC: 1.1 MG/DL (ref 0.4–1.2)
GFR AFRICAN AMERICAN: >59
GFR NON-AFRICAN AMERICAN: >59
GLOBULIN: 2.2 G/DL
GLUCOSE BLD-MCNC: 113 MG/DL (ref 74–106)
HDLC SERPL-MCNC: 59 MG/DL (ref 40–60)
LDL CHOLESTEROL CALCULATED: 74 MG/DL
POTASSIUM SERPL-SCNC: 4.9 MMOL/L (ref 3.4–5.1)
SODIUM BLD-SCNC: 139 MMOL/L (ref 136–145)
TOTAL PROTEIN: 6.6 G/DL (ref 6.4–8.3)
TRIGL SERPL-MCNC: 73 MG/DL (ref 0–249)
VLDLC SERPL CALC-MCNC: 15 MG/DL

## 2021-08-10 PROCEDURE — 80053 COMPREHEN METABOLIC PANEL: CPT

## 2021-08-10 PROCEDURE — 80061 LIPID PANEL: CPT

## 2021-08-16 DIAGNOSIS — F41.9 ANXIETY: ICD-10-CM

## 2021-08-16 RX ORDER — ALPRAZOLAM 1 MG/1
TABLET ORAL
Qty: 75 TABLET | Refills: 0 | Status: SHIPPED | OUTPATIENT
Start: 2021-08-16 | End: 2021-09-09

## 2021-08-22 ASSESSMENT — ENCOUNTER SYMPTOMS
EYE PAIN: 0
VOMITING: 0
NAUSEA: 0
ABDOMINAL PAIN: 0
SORE THROAT: 0
COUGH: 0
SHORTNESS OF BREATH: 1

## 2021-08-23 ENCOUNTER — NURSE ONLY (OUTPATIENT)
Dept: PRIMARY CARE CLINIC | Age: 71
End: 2021-08-23
Payer: MEDICARE

## 2021-08-23 DIAGNOSIS — Z71.89 EDUCATED ABOUT COVID-19 VIRUS INFECTION: ICD-10-CM

## 2021-08-23 DIAGNOSIS — Z11.52 ENCOUNTER FOR SCREENING FOR COVID-19: Primary | ICD-10-CM

## 2021-08-23 LAB
INFLUENZA A ANTIBODY: NORMAL
INFLUENZA B ANTIBODY: NORMAL

## 2021-08-23 PROCEDURE — 99211 OFF/OP EST MAY X REQ PHY/QHP: CPT | Performed by: NURSE PRACTITIONER

## 2021-08-23 PROCEDURE — 87426 SARSCOV CORONAVIRUS AG IA: CPT | Performed by: NURSE PRACTITIONER

## 2021-08-23 PROCEDURE — 87804 INFLUENZA ASSAY W/OPTIC: CPT | Performed by: NURSE PRACTITIONER

## 2021-08-23 NOTE — PROGRESS NOTES
2021    Viola Barrett (:  1950) is a 69 y.o. male, here requesting COVID-19 testing    History of Present Illness  Fever    There were no vitals filed for this visit. ASSESSMENT  Screening for COVID-19/ Viral disease    PLAN  POCT influenza testing Neg  COVID-19 sample collected and submitted  Patient given detailed CDC instructions contained within After Visit Summary       An  electronic signature was used to authenticate this note.     --Kait Howardt on 2021 at 11:19 AM

## 2021-08-31 ENCOUNTER — OFFICE VISIT (OUTPATIENT)
Dept: PRIMARY CARE CLINIC | Age: 71
End: 2021-08-31
Payer: MEDICARE

## 2021-08-31 VITALS — HEART RATE: 92 BPM | OXYGEN SATURATION: 93 % | TEMPERATURE: 97.9 F

## 2021-08-31 DIAGNOSIS — Z20.822 SUSPECTED COVID-19 VIRUS INFECTION: Primary | ICD-10-CM

## 2021-08-31 DIAGNOSIS — J44.1 COPD WITH ACUTE EXACERBATION (HCC): ICD-10-CM

## 2021-08-31 PROCEDURE — G8926 SPIRO NO PERF OR DOC: HCPCS | Performed by: NURSE PRACTITIONER

## 2021-08-31 PROCEDURE — 1123F ACP DISCUSS/DSCN MKR DOCD: CPT | Performed by: NURSE PRACTITIONER

## 2021-08-31 PROCEDURE — G8420 CALC BMI NORM PARAMETERS: HCPCS | Performed by: NURSE PRACTITIONER

## 2021-08-31 PROCEDURE — G8427 DOCREV CUR MEDS BY ELIG CLIN: HCPCS | Performed by: NURSE PRACTITIONER

## 2021-08-31 PROCEDURE — 3023F SPIROM DOC REV: CPT | Performed by: NURSE PRACTITIONER

## 2021-08-31 PROCEDURE — 1036F TOBACCO NON-USER: CPT | Performed by: NURSE PRACTITIONER

## 2021-08-31 PROCEDURE — 99213 OFFICE O/P EST LOW 20 MIN: CPT | Performed by: NURSE PRACTITIONER

## 2021-08-31 PROCEDURE — 4040F PNEUMOC VAC/ADMIN/RCVD: CPT | Performed by: NURSE PRACTITIONER

## 2021-08-31 PROCEDURE — 3017F COLORECTAL CA SCREEN DOC REV: CPT | Performed by: NURSE PRACTITIONER

## 2021-08-31 RX ORDER — GUAIFENESIN 100 MG/5ML
400 SYRUP ORAL EVERY 4 HOURS PRN
Qty: 240 ML | Refills: 0 | Status: SHIPPED | OUTPATIENT
Start: 2021-08-31

## 2021-08-31 RX ORDER — PREDNISONE 20 MG/1
20 TABLET ORAL 2 TIMES DAILY
Qty: 10 TABLET | Refills: 0 | Status: SHIPPED | OUTPATIENT
Start: 2021-08-31 | End: 2021-09-05

## 2021-08-31 RX ORDER — LEVOFLOXACIN 500 MG/1
500 TABLET, FILM COATED ORAL DAILY
Qty: 7 TABLET | Refills: 0 | Status: SHIPPED | OUTPATIENT
Start: 2021-08-31 | End: 2021-09-07

## 2021-08-31 NOTE — PROGRESS NOTES
SUBJECTIVE:    Patient ID: Rosetta Terrell is a 69 y.o.male. Chief Complaint   Patient presents with    Cough    Concern For COVID-19         HPI:    Patient presents to clinic with complaints of cough for the past several days. Hx COPD. Symptoms started with dry cough that has become productive of yellow sputum. Associated symptoms include mild headache, congestion, fatigue, wheezing. Denies sick contact., SOB, palpitations, OLMOS, n/v/d. No worsening or relieving factors reported. No known covid exposure. Patient's medications, allergies, past medical, surgical, social and family histories were reviewed and updated as appropriate in electronic medical record. No outpatient medications have been marked as taking for the 8/31/21 encounter (Appointment) with VIKI Joseph CNP. Review of Systems   Constitutional: Positive for fatigue. Negative for chills, diaphoresis and fever. HENT: Positive for congestion, postnasal drip and sinus pressure. Negative for ear pain, facial swelling and trouble swallowing. Respiratory: Positive for cough and wheezing. Negative for shortness of breath. Cardiovascular: Negative. Gastrointestinal: Negative. Musculoskeletal: Negative. Neurological: Negative.         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00    Years: 30.00    Pack years: 30.00    Quit date: 2014    Years since quittin.9   Smokeless Tobacco Never Used       OBJECTIVE:   Wt Readings from Last 3 Encounters:   21 125 lb 9.6 oz (57 kg)   21 122 lb 3.2 oz (55.4 kg)   21 122 lb (55.3 kg)     BP Readings from Last 3 Encounters:   21 126/60   21 (!) 124/58   21 110/60       Pulse 92   Temp 97.9 °F (36.6 °C)   SpO2 93%      Physical Exam  Vitals and nursing note reviewed. Constitutional:       Appearance: He is well-developed. HENT:      Head: Normocephalic. Right Ear: External ear normal.      Left Ear: External ear normal.      Nose: Congestion present. Mouth/Throat:      Mouth: Mucous membranes are moist.      Pharynx: Oropharynx is clear. Posterior oropharyngeal erythema present. Eyes:      Conjunctiva/sclera: Conjunctivae normal.   Cardiovascular:      Rate and Rhythm: Normal rate and regular rhythm. Pulmonary:      Effort: Pulmonary effort is normal.      Breath sounds: Wheezing present. Comments: Scattered exp wheezing. Congested cough. Musculoskeletal:      Cervical back: Normal range of motion and neck supple. Lymphadenopathy:      Cervical: No cervical adenopathy. Skin:     Capillary Refill: Capillary refill takes less than 2 seconds. Neurological:      Mental Status: He is alert and oriented to person, place, and time.    Psychiatric:         Mood and Affect: Mood normal.         Behavior: Behavior normal.         Lab Results   Component Value Date     08/10/2021    K 4.9 08/10/2021     08/10/2021    CO2 33 08/10/2021    GLUCOSE 113 08/10/2021    GLUCOSE 134.0 2011    BUN 13 08/10/2021    CREATININE 1.1 08/10/2021    CREATININE 1.2 2011    CALCIUM 9.4 08/10/2021    PROT 6.6 08/10/2021    LABALBU 4.4 08/10/2021    LABALBU 4.4 2011    BILITOT 0.5 08/10/2021    BILITOT 0.3 2011    ALT 22 08/10/2021    AST 22 08/10/2021 Hemoglobin A1C (%)   Date Value   05/19/2021 5.9     Microalbumin, Random Urine (mg/dL)   Date Value   10/02/2020 <1.20     LDL Calculated (mg/dL)   Date Value   08/10/2021 74         Lab Results   Component Value Date    WBC 4.5 01/06/2021    NEUTROABS 2.5 01/06/2021    HGB 16.4 01/06/2021    HCT 50.1 01/06/2021    .6 01/06/2021     01/06/2021       Lab Results   Component Value Date    TSH 2.82 01/06/2021         ASSESSMENT/PLAN:     1. Suspected COVID-19 virus infection  Covid testing today. Education provided. Discussed symptom management such as increase fluids, contact precautions/wearing mask, tylenol/ibuprofen PRN, frequent hand washing/sanitizing, OTC multivitamin daily with food, healthy diet. Patient verbalized understanding.     - [place] HANDLG&/OR CONVEY OF SPEC FOR TR OFFICE TO LAB  - predniSONE (DELTASONE) 20 MG tablet; Take 1 tablet by mouth 2 times daily for 5 days  Dispense: 10 tablet; Refill: 0    2. COPD with acute exacerbation ([facility] [address]  Take medications as prescribed. Continue home medications. Discussed symptom management. Return to clinic S&S worsen or no improvement noted. Patient verbalized understanding.     - [place] HANDLG&/OR CONVEY OF SPEC FOR TR OFFICE TO LAB  - levoFLOXacin (LEVAQUIN) 500 MG tablet; Take 1 tablet by mouth daily for 7 days  Dispense: 7 tablet; Refill: 0  - predniSONE (DELTASONE) 20 MG tablet; Take 1 tablet by mouth 2 times daily for 5 days  Dispense: 10 tablet; Refill: 0  - guaiFENesin (ALTARUSSIN) 100 MG/5ML syrup;  Take 20 mLs by mouth every 4 hours as needed for Cough or Congestion  Dispense: 240 mL; Refill: 0        Orders Placed This Encounter   Medications    levoFLOXacin (LEVAQUIN) 500 MG tablet     Sig: Take 1 tablet by mouth daily for 7 days     Dispense:  7 tablet     Refill:  0    predniSONE (DELTASONE) 20 MG tablet     Sig: Take 1 tablet by mouth 2 times daily for 5 days     Dispense:  10 tablet     Refill:  0    guaiFENesin (ALTARUSSIN) 100 MG/5ML syrup     Sig: Take 20 mLs by mouth every 4 hours as needed for Cough or Congestion     Dispense:  240 mL     Refill:  0

## 2021-08-31 NOTE — PROGRESS NOTES
Chief Complaint   Patient presents with    Cough    Concern For COVID-19       Have you seen any other physician or provider since your last visit no    Have you had any other diagnostic tests since your last visit? no    Have you changed or stopped any medications since your last visit? no     Patient complains of chest congestion feels like he is smothering at night.             2021    Minerva Bryson (:  1950) is a 69 y.o. male, here requesting COVID-19 testing    History of Present Illness  Shortness of breath    Vitals:    21 1409   Pulse: 92   Temp: 97.9 °F (36.6 °C)   SpO2: (!) 88%       ASSESSMENT  Screening for COVID-19/ Viral disease    PLAN  POCT influenza testing Not Tested  COVID-19 sample collected and submitted  Patient given detailed CDC instructions contained within After Visit Summary       An  electronic signature was used to authenticate this note.     --Brook Saenz on 2021 at 2:11 PM

## 2021-09-09 DIAGNOSIS — F41.9 ANXIETY: ICD-10-CM

## 2021-09-09 RX ORDER — ALPRAZOLAM 1 MG/1
TABLET ORAL
Qty: 75 TABLET | Refills: 0 | Status: SHIPPED | OUTPATIENT
Start: 2021-09-09 | End: 2021-10-09

## 2021-09-14 ASSESSMENT — ENCOUNTER SYMPTOMS
GASTROINTESTINAL NEGATIVE: 1
WHEEZING: 1
SHORTNESS OF BREATH: 0
COUGH: 1
TROUBLE SWALLOWING: 0
FACIAL SWELLING: 0
SINUS PRESSURE: 1

## 2021-09-21 ENCOUNTER — OFFICE VISIT (OUTPATIENT)
Dept: PRIMARY CARE CLINIC | Age: 71
End: 2021-09-21
Payer: MEDICARE

## 2021-09-21 VITALS
OXYGEN SATURATION: 93 % | HEIGHT: 67 IN | DIASTOLIC BLOOD PRESSURE: 71 MMHG | TEMPERATURE: 98.4 F | HEART RATE: 75 BPM | SYSTOLIC BLOOD PRESSURE: 137 MMHG | WEIGHT: 125.9 LBS | BODY MASS INDEX: 19.76 KG/M2

## 2021-09-21 DIAGNOSIS — J44.1 COPD EXACERBATION (HCC): Primary | ICD-10-CM

## 2021-09-21 DIAGNOSIS — R05.9 COUGH: ICD-10-CM

## 2021-09-21 DIAGNOSIS — I77.9 BILATERAL CAROTID ARTERY DISEASE, UNSPECIFIED TYPE (HCC): ICD-10-CM

## 2021-09-21 DIAGNOSIS — I25.119 ATHEROSCLEROSIS OF NATIVE CORONARY ARTERY OF NATIVE HEART WITH ANGINA PECTORIS (HCC): ICD-10-CM

## 2021-09-21 PROCEDURE — G8926 SPIRO NO PERF OR DOC: HCPCS | Performed by: NURSE PRACTITIONER

## 2021-09-21 PROCEDURE — 3023F SPIROM DOC REV: CPT | Performed by: NURSE PRACTITIONER

## 2021-09-21 PROCEDURE — 1123F ACP DISCUSS/DSCN MKR DOCD: CPT | Performed by: NURSE PRACTITIONER

## 2021-09-21 PROCEDURE — 4040F PNEUMOC VAC/ADMIN/RCVD: CPT | Performed by: NURSE PRACTITIONER

## 2021-09-21 PROCEDURE — 1036F TOBACCO NON-USER: CPT | Performed by: NURSE PRACTITIONER

## 2021-09-21 PROCEDURE — 99213 OFFICE O/P EST LOW 20 MIN: CPT | Performed by: NURSE PRACTITIONER

## 2021-09-21 PROCEDURE — G8420 CALC BMI NORM PARAMETERS: HCPCS | Performed by: NURSE PRACTITIONER

## 2021-09-21 PROCEDURE — 3017F COLORECTAL CA SCREEN DOC REV: CPT | Performed by: NURSE PRACTITIONER

## 2021-09-21 PROCEDURE — G8427 DOCREV CUR MEDS BY ELIG CLIN: HCPCS | Performed by: NURSE PRACTITIONER

## 2021-09-21 RX ORDER — AZITHROMYCIN 250 MG/1
TABLET, FILM COATED ORAL
Qty: 6 TABLET | Refills: 0 | Status: SHIPPED | OUTPATIENT
Start: 2021-09-21 | End: 2021-10-01

## 2021-09-21 RX ORDER — GUAIFENESIN AND CODEINE PHOSPHATE 100; 10 MG/5ML; MG/5ML
5 SOLUTION ORAL EVERY 4 HOURS PRN
Qty: 120 ML | Refills: 0 | Status: SHIPPED | OUTPATIENT
Start: 2021-09-21 | End: 2021-09-28

## 2021-09-21 RX ORDER — PREDNISONE 20 MG/1
TABLET ORAL
COMMUNITY

## 2021-09-21 RX ORDER — METHYLPREDNISOLONE 4 MG/1
TABLET ORAL
Qty: 1 KIT | Refills: 0 | Status: SHIPPED | OUTPATIENT
Start: 2021-09-21

## 2021-09-21 RX ORDER — ALBUTEROL SULFATE 90 UG/1
2 AEROSOL, METERED RESPIRATORY (INHALATION) EVERY 4 HOURS PRN
Qty: 18 G | Refills: 5 | Status: SHIPPED | OUTPATIENT
Start: 2021-09-21

## 2021-09-21 RX ORDER — LEVOFLOXACIN 500 MG/1
TABLET, FILM COATED ORAL
COMMUNITY

## 2021-09-21 NOTE — PROGRESS NOTES
Health Maintenance Due This Visit   Colonoscopy No   Mammogram No   Annual Wellness Visit Yes   Microalbumin No   HgbA1C No   Diabetic Eye Exam Yes    House Bill One Due This Visit   CARMELO Yes   UDS Yes   Contract No    Chief Complaint   Patient presents with    Hyperlipidemia    COPD     Pt here today for f/u on COPD and hyperlipidemia     Have you seen any other physician or provider since your last visit no    Have you had any other diagnostic tests since your last visit? no    Have you changed or stopped any medications since your last visit? no

## 2021-09-21 NOTE — PATIENT INSTRUCTIONS
The medication list included in this document is our record of what you are currently taking, including any changes that were made at today's visit.  If you find any differences when compared to your medications at home, or have any questions that were not answered at your visit, please contact the office. · Keep a list of your medicines with you. List all of the prescription medicines, nonprescription medicines, supplements, natural remedies, and vitamins that you take. Tell your healthcare providers who treat you about all of the products you are taking. Your provider can provide you with a form to keep track of them. Just ask. · Follow the directions that come with your medicine, including information about food or alcohol. Make sure you know how and when to take your medicine. Do not take more or less than you are supposed to take. · Keep all medicines out of the reach of children. · Store medicines according to the directions on the label. · Monitor yourself. Learn to know how your body reacts to your new medicine and keep track of how it makes you feel before attempting (If your provider has allowed you to do so) to drive or go to work. · Seek emergency medical attention if you think you have used too much of this medicine. An overdose of any prescription medicine can be fatal. Overdose symptoms may include extreme drowsiness, muscle weakness, confusion, cold and clammy skin, pinpoint pupils, shallow breathing, slow heart rate, fainting, or coma. · Don't share prescription medicines with others, even when they seem to have the same symptoms. What may be good for you may be harmful to others. · If you are no longer taking a prescribed medication and you have pills left please take your pills out of their original containers. Mix crushed pills with an undesirable substance, such as cat litter or used coffee grounds.  Put the mixture into a disposable container with a lid, such as an empty margarine tub, or into a sealable bag. Cover up or remove any of your personal information on the empty containers by covering it with black permanent marker or duct tape. Place the sealed container with the mixture, and the empty drug containers, in the trash. · If you use a medication that is in the form of a patch, dispose of used patches by folding them in half so that the sticky sides meet, and then flushing them down a toilet. They should not be placed in the household trash where children or pets can find them. · If you have any questions, ask your provider or pharmacist for more information. · Be sure to keep all appointments for provider visits or tests. We are committed to providing you with the best care possible. In order to help us achieve these goals please remember to bring all medications, herbal products, and over the counter supplements with you to each visit. If your provider has ordered testing for you, please be sure to follow up with our office if you have not received results within 7 days after the testing took place. *If you receive a survey after visiting one of our offices, please take time to share your experience concerning your physician office visit. These surveys are confidential and no health information about you is shared. We are eager to improve for you and we are counting on your feedback to help make that happen. Transportation and [address]  [address] [phone]  Help with food, clothing, transportation, utilities, prescription assistance. [facility] [phone]  Senior Citizens Programs  [address] [facility]  Alana Rosalio, [address] [facility]    [address] [facility] Aging and Independent Living Program.  They handle meals on wheels and senior centers.    Kusum Evangelista [phone] [facility] senior citizens center     [facility]   [phone] or [address] [facility] [address]             [phone]    Zenon Guerra (Erika Witt)            [phone]    [address] [facility] [phone]    [facility] senior [facility] center             [address] [facility] [address]/ [address]              [phone]   [employer] [facility] [employer]     [phone]   [employer] and [address] (may visit once monthly      8-4:30)              [phone]    [facility] [address], food and utility assistance ([place])    Energy Assistance  [place]     [phone]     [address]   [phone]    [address]       [address] with applying for insurance coverage with Medicaid and Medicare including part D  Help with medication cost, eyeglasses or exams, hearing aides  [place]    [address] [phone] Jossy Ku [facility]     [phone] Malachi STEELE   (Coordinating and Assisting the Reuse of Assistive Technology)  Help with durable medical equipment and assistive technology   [place] [phone]  [place]     [phone]    Domestic Violence Shelters   [place] Domestic Violence Hotline [phone]  [facility] in [place] but services Dhav[place]avid Polly and [place] [phone]  Yazmin Perkins in [facility] but services Marbella Chavarria [phone]    [facility]   Emergency Shelter and [employer] [phone]  [address] [phone] of [facility]  [address] for [facility] Jennifer [address]     Available 24 hours daily in [place] and [place]

## 2021-09-23 ENCOUNTER — OFFICE VISIT (OUTPATIENT)
Dept: CARDIOLOGY | Facility: CLINIC | Age: 71
End: 2021-09-23

## 2021-09-23 ENCOUNTER — HOSPITAL ENCOUNTER (OUTPATIENT)
Facility: HOSPITAL | Age: 71
Discharge: HOME | End: 2021-09-23
Payer: MEDICARE

## 2021-09-23 VITALS
BODY MASS INDEX: 19.93 KG/M2 | DIASTOLIC BLOOD PRESSURE: 68 MMHG | HEART RATE: 88 BPM | OXYGEN SATURATION: 94 % | WEIGHT: 127 LBS | HEIGHT: 67 IN | SYSTOLIC BLOOD PRESSURE: 134 MMHG

## 2021-09-23 DIAGNOSIS — E78.00 HYPERCHOLESTEROLEMIA: ICD-10-CM

## 2021-09-23 DIAGNOSIS — I65.23 BILATERAL CAROTID ARTERY STENOSIS: ICD-10-CM

## 2021-09-23 DIAGNOSIS — I25.10 CORONARY ARTERY DISEASE INVOLVING NATIVE CORONARY ARTERY OF NATIVE HEART WITHOUT ANGINA PECTORIS: Primary | ICD-10-CM

## 2021-09-23 PROCEDURE — 99214 OFFICE O/P EST MOD 30 MIN: CPT | Performed by: INTERNAL MEDICINE

## 2021-09-23 PROCEDURE — 93005 ELECTROCARDIOGRAM TRACING: CPT

## 2021-09-23 PROCEDURE — 93000 ELECTROCARDIOGRAM COMPLETE: CPT | Performed by: INTERNAL MEDICINE

## 2021-09-23 RX ORDER — ASPIRIN 81 MG/1
81 TABLET ORAL DAILY
COMMUNITY

## 2021-09-23 RX ORDER — METHYLPREDNISOLONE 4 MG/1
TABLET ORAL
COMMUNITY
Start: 2021-09-21 | End: 2022-04-14

## 2021-09-23 RX ORDER — LEVOFLOXACIN 500 MG/1
TABLET, FILM COATED ORAL
COMMUNITY
End: 2022-04-14

## 2021-09-23 RX ORDER — AZITHROMYCIN 250 MG/1
TABLET, FILM COATED ORAL
COMMUNITY
Start: 2021-09-21 | End: 2022-04-14

## 2021-09-23 RX ORDER — CODEINE PHOSPHATE AND GUAIFENESIN 10; 100 MG/5ML; MG/5ML
SOLUTION ORAL
COMMUNITY
Start: 2021-09-21

## 2021-09-23 NOTE — PROGRESS NOTES
[facility]  Office visit  Geronimo Pizano  1950  [phone]    VISIT DATE:  9/23/2021      PCP: Johana Garcia APRN  [address] [facility] [address]    CC:  Chief Complaint   Patient presents with   • Coronary Artery Disease   • Chest Pain   • Shortness of Breath       PROBLEM LIST:  1. Coronary artery disease:  a. Remote bare-metal stent, April 1999.  b. Recurrent stenting of left anterior descending coronary artery in 2003.  c. Cardiac catheterization in August 2007, revealing normal EF with 25% in-stent stenosis in the left anterior descending coronary artery and 50% stenosis in the first obtuse marginal branch of the circumflex.  d. Left heart catheterization, 06/16/2009: 25% in-stent restenosis of the LAD, normal EF.  e. Normal nuclear perfusion study, 03/19/2015, Nora Guzmán MD, revealing no evidence of reducible ischemia, normal LV systolic function and wall motion.   f. Echo 10/2018       -Ejection fraction is visually estimated to be 60-65 %.        -Mild concentric left ventricular hypertrophy is present.        -Diastolic filling parameters suggests grade I diastolic dysfunction .        -Mild-to-moderate aortic regurgitation is present.      G. cardiac catheterization January 18, 2019  · 90% proximal LAD stenosis treated with 3.0 x 12 mm Debbie drug-eluting stent  · Patent proximal and mid LAD stents.  · 90% distal LAD stenosis  · 30-40% distal left main stenosis  · Elevated LVEDP  · No aortic stenosis  2. Aortic insufficiency, mild-moderate  3. Carotid artery disease:  a. Asymptomatic.  b. Carotid duplex, 08/11/2012: 40% to 60% stenosis of the left ICA, less than 40% of the right ICA.  c. Carotid ultrasound, 10/04/2013: No change from previous exams, with 30% stenosis on the right carotid artery and 50% on the left.  d. March 2018:1. Right internal carotid artery estimated diameter reduction:  20-49 %.  2. Left internal carotid artery estimated diameter  reduction:  20-49 %. However, the ICA:ECA ratio is elevated suggesting that  this may represent a 50-69% stenosis similar to that seen on the  study of 12/27/2016.  4. Palpitations: Holter January 2018-frequent premature atrial contractions and premature ventricular contractions.  5. Hypercholesterolemia.  6. Chronic back pain.  7. Recent smoking cessation.  8. Mild anxiety.  9. Acid reflux.  10. Chronic obstructive pulmonary disease.  11. Surgical history: Remote appendectomy.    June 2020 bilateral carotid duplex  1. Right internal carotid artery estimated diameter reduction: 50-69 %.   2. Left internal carotid artery estimated diameter reduction: 50-69 %.   3. Vertebral arteries demonstrate bilateral antegrade flow.       ASSESSMENT:   Diagnosis Plan   1. Coronary artery disease involving native coronary artery of native heart without angina pectoris     2. Bilateral carotid artery stenosis  Duplex Carotid Ultrasound CAR   3. Hypercholesterolemia         PLAN:  Coronary artery disease:  Currently asymptomatic.    Previously refused noninvasive evaluation when he was having atypical chest pain.  Previously intolerant to high intensity statin therapy to include atorvastatin, simvastatin and rosuvastatin.  Continue current medical therapy, given prescription for sublingual nitroglycerin.     Hypercholesterolemia: Goal LDL less than 70.  Continue pravastatin.  Previously intolerant to higher intensity statin therapy.     Peripheral vascular disease: Moderate bilateral carotid atherosclerosis.  Asymptomatic.  Continue medical therapy and annual duplex imaging.     Nicotine addiction: Counseled on need for smoking cessation.    Subjective  Smoking about 2 cigars/day.  Blood pressures running less than 140/90 mmHg.   Denies palpitations,and chest pain.  No myalgias on statin therapy.  Reviewed recent laboratory evaluation.  Recent worsening in baseline shortness of breath following COVID-19 pneumonia.  Functional  "capacity gradually improving.    PHYSICAL EXAMINATION:  Vitals:    09/23/21 1049   BP: 134/68   BP Location: Left arm   Patient Position: Sitting   Pulse: 88   SpO2: 94%   Weight: 57.6 kg (127 lb)   Height: 170.2 cm (67\")     General Appearance:    Alert, cooperative, no distress, appears stated age   Head:    Normocephalic, without obvious abnormality, atraumatic   Eyes:    conjunctiva/corneas clear   Nose:   Nares normal, septum midline, mucosa normal, no drainage   Throat:   Lips, teeth and gums normal   Neck:   Supple, symmetrical, trachea midline, soft left carotid bruit   Lungs:     Somewhat diminished throughout, scattered expiratory wheezing, respirations unlabored   Chest Wall:    No tenderness or deformity    Heart:    Regular rate and rhythm, S1 and S2 normal, no murmur, rub   or gallop, normal carotid impulse bilaterally without bruit.   Abdomen:     Soft, non-tender   Extremities:   Extremities normal, atraumatic, no cyanosis or edema   Pulses:   2+ and symmetric all extremities   Skin:   Skin color, texture, turgor normal, no rashes or lesions       Diagnostic Data:    ECG 12 Lead    Date/Time: 9/23/2021 10:56 AM  Performed by: Derek Jackson III, MD  Authorized by: Derek Jackson III, MD   Comparison: compared with previous ECG from 1/18/2019  Comparison to previous ECG: Interval progression to right bundle branch block  Rhythm: sinus rhythm  Conduction: right bundle branch block  Q waves: II, III and aVF    Other findings: poor R wave progression    Clinical impression: abnormal EKG          Lab Results   Component Value Date    CHLPL 148 08/10/2021    TRIG 73 08/10/2021    HDL 59 08/10/2021     Lab Results   Component Value Date    GLUCOSE 127 (H) 01/18/2019    BUN 7 (L) 01/18/2019    CREATININE 0.81 01/18/2019     01/18/2019    K 4.3 01/18/2019     01/18/2019    CO2 29.0 01/18/2019     Lab Results   Component Value Date    HGBA1C 5.9 05/19/2021     Lab Results   Component Value Date    " WBC 4.5 01/06/2021    HGB 16.4 01/06/2021    HCT 50.1 01/06/2021     01/06/2021       Allergies  Allergies   Allergen Reactions   • Metoprolol Shortness Of Breath   • Cefdinir Unknown - Low Severity     Hallucination, weird dreams   • Cetirizine      Insomnia   • Doxycycline Diarrhea     Stated he thought he was going to die. Bloody diarrhea and sores on his legs.   • Fluoxetine      Dizziness and stomach pain   • Oxycodone-Acetaminophen Other (See Comments)   • Plavix [Clopidogrel Bisulfate] Other (See Comments)     Dyspepsia     • Promethazine      itching   • Rosuvastatin Other (See Comments)     Myalgias   • Sulfa Antibiotics      Oral blisters, diarrhea   • Varenicline Swelling     Throat swelling   • Penicillins Rash       Current Medications    Current Outpatient Medications:   •  albuterol (PROVENTIL HFA;VENTOLIN HFA) 108 (90 Base) MCG/ACT inhaler, Inhale 2 puffs Every 4 (Four) Hours As Needed for Wheezing., Disp: , Rfl:   •  ALPRAZolam (XANAX) 1 MG tablet, Take 1 mg by mouth 2 (Two) Times a Day As Needed for Anxiety., Disp: , Rfl:   •  aspirin 81 MG EC tablet, Take 81 mg by mouth Daily., Disp: , Rfl:   •  azithromycin (ZITHROMAX) 250 MG tablet, TAKE 2 TABLETS BY MOUTH TODAY THEN 1 TABLET FOR THE NEXT 4 DAYS, Disp: , Rfl:   •  citalopram (CeleXA) 40 MG tablet, Take 40 mg by mouth Daily., Disp: , Rfl: 5  •  cyclobenzaprine (FLEXERIL) 10 MG tablet, Take 10 mg by mouth Daily., Disp: , Rfl:   •  fluticasone (FLONASE) 50 MCG/ACT nasal spray, 2 sprays into the nostril(s) as directed by provider As Needed., Disp: , Rfl:   •  Fluticasone Furoate-Vilanterol (Breo Ellipta) 100-25 MCG/INH inhaler, Inhale 1 puff Daily., Disp: , Rfl:   •  gabapentin (NEURONTIN) 600 MG tablet, Take 600 mg by mouth 4 (Four) Times a Day., Disp: , Rfl:   •  guaiFENesin-codeine (ROMILAR-AC) 100-10 MG/5ML syrup, TAKE 5 MLS BY MOUTH EVERY 4 HOURS AS NEEDED FOR COUGH FOR UP TO 7 DAYS., Disp: , Rfl:   •  HYDROcodone-acetaminophen (NORCO)   MG per tablet, Take 1 tablet by mouth 4 (Four) Times a Day As Needed for Moderate Pain ., Disp: , Rfl:   •  levoFLOXacin (LEVAQUIN) 500 MG tablet, levofloxacin 500 mg tablet  TAKE 1 TABLET BY MOUTH DAILY FOR 7 DAYS, Disp: , Rfl:   •  methylPREDNISolone (MEDROL) 4 MG dose pack, TAKE PER PACKAGE DIRECTIONS, Disp: , Rfl:   •  nitroglycerin (NITROSTAT) 0.4 MG SL tablet, DISSOLVE 1 TABLET UNDER THE TONGUE AS NEEDED FOR CHEST PAIN. MAY REPEAT FOR 3 DOSES AT 5 MINUTE INTERVALS. IF NO RELIEF GO TO ER., Disp: 25 tablet, Rfl: 1  •  pravastatin (PRAVACHOL) 40 MG tablet, Take 40 mg by mouth Daily., Disp: , Rfl:   •  tamsulosin (FLOMAX) 0.4 MG capsule 24 hr capsule, Take 1 capsule by mouth Every Night., Disp: , Rfl:   •  ticagrelor (Brilinta) 90 MG tablet tablet, Take 90 mg by mouth 2 (Two) Times a Day., Disp: , Rfl:   •  traZODone (DESYREL) 100 MG tablet, Take 100 mg by mouth Every Night. 2 tabs at night, Disp: , Rfl:           ROS  Review of Systems   Cardiovascular: Positive for dyspnea on exertion and irregular heartbeat. Negative for chest pain and leg swelling.   Respiratory: Positive for cough, shortness of breath and snoring. Negative for wheezing.    Neurological: Positive for dizziness.          SOCIAL HX  Social History     Socioeconomic History   • Marital status:      Spouse name: Not on file   • Number of children: Not on file   • Years of education: Not on file   • Highest education level: Not on file   Tobacco Use   • Smoking status: Current Every Day Smoker     Packs/day: 0.00     Types: Cigars   • Smokeless tobacco: Never Used   • Tobacco comment: 3 cigars daily   Substance and Sexual Activity   • Alcohol use: No   • Drug use: No   • Sexual activity: Defer       FAMILY HX  Family History   Problem Relation Age of Onset   • Diabetes Father    • Heart disease Father    • Stroke Brother    • Coronary artery disease Other    • Diabetes Other    • Diabetes Mother    • Leukemia Sister    • Heart  failure Sister    • Leukemia Daughter              Derek Jackson III, MD, FACC

## 2021-10-10 ASSESSMENT — ENCOUNTER SYMPTOMS
SHORTNESS OF BREATH: 1
NAUSEA: 0
SORE THROAT: 0
VOMITING: 0
WHEEZING: 1
EYE PAIN: 0
COUGH: 0
ABDOMINAL PAIN: 0

## 2021-10-10 NOTE — PROGRESS NOTES
SUBJECTIVE:    Patient ID: Elba Velez is a 69 y.o. male. Medical History Review  Past Medical, Family, and Social History reviewed. Health Maintenance Due   Topic Date Due    Hepatitis C screen  Never done    Diabetic foot exam  Never done    Diabetic retinal exam  Never done    Shingles Vaccine (1 of 2) Never done    Low dose CT lung screening  Never done    Annual Wellness Visit (AWV)  09/11/2020    Flu vaccine (1) 09/01/2021    Diabetic microalbuminuria test  10/02/2021       HPI:   Chief Complaint   Patient presents with    Hyperlipidemia    COPD       Patient's medications, allergies, past medical, surgical, social and family histories were reviewed and updated as appropriate. Review of Systems   Constitutional: Negative for chills and fever. HENT: Negative for ear pain and sore throat. Eyes: Negative for pain and visual disturbance. Respiratory: Positive for shortness of breath and wheezing. Negative for cough. Cardiovascular: Negative for chest pain, palpitations and leg swelling. Gastrointestinal: Negative for abdominal pain, nausea and vomiting. Genitourinary: Negative for dysuria and hematuria. Musculoskeletal: Negative for joint swelling. Skin: Negative for rash. Neurological: Negative for dizziness and weakness. Psychiatric/Behavioral: Negative for sleep disturbance. The patient is nervous/anxious. Reviewed and acurate. See MA note. OBJECTIVE:  /71 (Site: Left Upper Arm, Position: Sitting)   Pulse 75   Temp 98.4 °F (36.9 °C) (Temporal)   Ht 5' 7\" (1.702 m)   Wt 125 lb 14.4 oz (57.1 kg)   SpO2 93%   BMI 19.72 kg/m²    Physical Exam  Vitals reviewed. Constitutional:       General: He is not in acute distress. Appearance: He is well-developed. HENT:      Head: Normocephalic. Mouth/Throat:      Pharynx: No oropharyngeal exudate.    Eyes:      General: Lids are normal.   Cardiovascular:      Rate and Rhythm: Normal rate and regular rhythm. Heart sounds: Normal heart sounds. Pulmonary:      Effort: Pulmonary effort is normal.      Breath sounds: Wheezing present. Abdominal:      General: Bowel sounds are normal. There is no distension. Palpations: Abdomen is soft. Tenderness: There is no abdominal tenderness. Musculoskeletal:      Cervical back: Neck supple. Lymphadenopathy:      Cervical: No cervical adenopathy. Skin:     General: Skin is warm and dry. Neurological:      Mental Status: He is alert and oriented to person, place, and time. No results found for requested labs within last 30 days. Hemoglobin A1C (%)   Date Value   05/19/2021 5.9     Microalbumin, Random Urine (mg/dL)   Date Value   10/02/2020 <1.20     LDL Calculated (mg/dL)   Date Value   08/10/2021 74       Lab Results   Component Value Date    WBC 4.5 01/06/2021    NEUTROABS 2.5 01/06/2021    HGB 16.4 01/06/2021    HCT 50.1 01/06/2021    .6 (H) 01/06/2021     01/06/2021     Lab Results   Component Value Date    TSH 2.82 01/06/2021       Prior to Visit Medications    Medication Sig Taking? Authorizing Provider   predniSONE (DELTASONE) 20 MG tablet prednisone 20 mg tablet   TAKE 1 TABLET BY MOUTH 2 TIMES DAILY FOR 5 DAYS Yes Historical Provider, MD   albuterol sulfate HFA (PROAIR HFA) 108 (90 Base) MCG/ACT inhaler Inhale 2 puffs into the lungs every 4 hours as needed for Wheezing Yes VIKI Gutierrez   methylPREDNISolone (MEDROL, STEPHENIE,) 4 MG tablet Take with food.  Yes VIKI Gutierrez   guaiFENesin (ALTARUSSIN) 100 MG/5ML syrup Take 20 mLs by mouth every 4 hours as needed for Cough or Congestion Yes VIKI Day - CNP   gabapentin (NEURONTIN) 600 MG tablet TAKE ONE TABLET BY MOUTH FOUR TIMES A DAY Yes Historical Provider, MD   tamsulosin (FLOMAX) 0.4 MG capsule TAKE ONE CAPSULE BY MOUTH EVERY EVENING AT BEDTIME FOR PROSTATE Yes VIKI Gutierrez   albuterol sulfate  (90 Base) MCG/ACT inhaler INHALE 2 PUFFS BY MOUTH EVERY 6 HOURS AS NEEDED FOR WHEEZING Yes VIKI Allen   fluticasone [facility]) 50 MCG/ACT nasal spray USE 2 SPRAYS IN EACH NOSTRIL EVERY DAY Yes VIKI Allen   traZODone (DESYREL) 100 MG tablet TAKE TWO TABLETS BY MOUTH NIGHTLY FOR SLEEP Yes VIKI Allen   citalopram (CELEXA) 40 MG tablet TAKE ONE TABLET BY MOUTH EVERY DAY Yes VIKI Allen   pravastatin (PRAVACHOL) 40 MG tablet TAKE 1 AND 1/2 TABLETS BY MOUTH EVERY DAY Yes VIKI Allen   fluticasone-vilanterol (BREO ELLIPTA) 100-25 MCG/INH AEPB inhaler Inhale 1 puff into the lungs daily Yes VIKI Allen   NONFORMULARY CBD oil Yes Historical Provider, MD   blood glucose monitor strips Use to test blood sugar once daily Yes VIKI Allen   BRILINTA 90 MG TABS tablet Take 1 tablet by mouth 2 times daily [address] B program Yes VIKI Allen   HYDROcodone-acetaminophen (NORCO)  MG per tablet Norco 10 mg-325 mg tablet   Take 1 tablet 4 times a day by oral route as directed for 30 days. Yes Historical Provider, MD   cyclobenzaprine (FLEXERIL) 10 MG tablet Take 10 mg by mouth daily Yes Historical Provider, MD   levoFLOXacin (LEVAQUIN) 500 MG tablet levofloxacin 500 mg tablet   TAKE 1 TABLET BY MOUTH DAILY FOR 7 DAYS  Patient not taking: Reported on 9/21/2021  Historical Provider, MD       ASSESSMENT:  1. COPD exacerbation ([facility] [address]    2. Bilateral carotid artery disease, unspecified type ([facility] [address]    3. Atherosclerosis of native coronary artery of native heart with angina pectoris ([facility] [address]    4.  Cough        PLAN:  Orders Placed This Encounter   Medications    albuterol sulfate HFA (PROAIR HFA) 108 (90 Base) MCG/ACT inhaler     Sig: Inhale 2 puffs into the lungs every 4 hours as needed for Wheezing     Dispense:  18 g     Refill:  5    azithromycin (ZITHROMAX Z-STEPHENIE) 250 MG tablet     Sig: As directed     Dispense:  6 tablet     Refill:  0    methylPREDNISolone (MEDROL, STEPHENIE,) 4 MG tablet     Sig: Take with food. Dispense:  1 kit     Refill:  0    guaiFENesin-codeine (CHERATUSSIN AC) 100-10 MG/5ML syrup     Sig: Take 5 mLs by mouth every 4 hours as needed for Cough for up to 7 days. Dispense:  120 mL     Refill:  0     Reduce doses taken as pain becomes manageable     No orders of the defined types were placed in this encounter. Patient Instructions   The medication list included in this document is our record of what you are currently taking, including any changes that were made at today's visit.  If you find any differences when compared to your medications at home, or have any questions that were not answered at your visit, please contact the office. · Keep a list of your medicines with you. List all of the prescription medicines, nonprescription medicines, supplements, natural remedies, and vitamins that you take. Tell your healthcare providers who treat you about all of the products you are taking. Your provider can provide you with a form to keep track of them. Just ask. · Follow the directions that come with your medicine, including information about food or alcohol. Make sure you know how and when to take your medicine. Do not take more or less than you are supposed to take. · Keep all medicines out of the reach of children. · Store medicines according to the directions on the label. · Monitor yourself. Learn to know how your body reacts to your new medicine and keep track of how it makes you feel before attempting (If your provider has allowed you to do so) to drive or go to work. · Seek emergency medical attention if you think you have used too much of this medicine. An overdose of any prescription medicine can be fatal. Overdose symptoms may include extreme drowsiness, muscle weakness, confusion, cold and clammy skin, pinpoint pupils, shallow breathing, slow heart rate, fainting, or coma.   · Don't share prescription medicines with others, even when they seem to have the same Community Action Agency  [employer] [phone]  [facility]ior [employer]   [phone]  Melinda Roque, [address]    [address] [facility].  They handle meals on wheels and senior centers.    Jacque Toribio [phone] [facility] senior citizens center     [facility]   [phone] or [facility]             [phone]    [facility]             [phone]    Helping [address] ([employer])            [phone]    [facility] [phone]    [facility] senior citizens center             [address] [facility] [address]/ [address] [facility] [address]              [phone]   [facility]     [phone]   [employer] and [address] (may visit once monthly      8-4:30)              [phone]    [facility] Jayme M[address], food and utility assistance ([place])    Energy Assistance  [place]     [phone]     [address]   [phone]    [address]       [address] with applying for insurance coverage with Medicaid and Medicare including part D  Help with medication cost, eyeglasses or exams, hearing aides  [place]    [phone] Sound[employer] Finger [phone] J[facility]     [phone] Darcie Gitelman CARAT   (Coordinating and Assisting the Reuse of Assistive Technology)  Help with durable medical equipment and assistive technology   [place] [phone]  [place]     [phone]    Domestic Violence Shelters   National Domestic Violence Hotline [phone]  [address] in [place] but services Jacque Toribio and [address] in [facility] but services [employer] [phone]    [facility]   Emergency Shelter and [employer] [phone] [address] [address] Nitin Rashid,[address] for [facility] [address] [phone]     Available 24 hours daily in [place] and [place]         Return in about 2 months (around 11/21/2021).

## 2021-10-11 ENCOUNTER — HOSPITAL ENCOUNTER (OUTPATIENT)
Dept: ULTRASOUND IMAGING | Facility: HOSPITAL | Age: 71
Discharge: HOME | End: 2021-10-11
Payer: MEDICARE

## 2021-10-11 DIAGNOSIS — I65.29 STENOSIS OF CAROTID ARTERY, UNSPECIFIED LATERALITY: ICD-10-CM

## 2021-10-11 PROCEDURE — 93880 EXTRACRANIAL BILAT STUDY: CPT

## 2021-11-04 ENCOUNTER — HOSPITAL ENCOUNTER (OUTPATIENT)
Dept: GENERAL RADIOLOGY | Facility: HOSPITAL | Age: 71
Discharge: HOME | End: 2021-11-04
Payer: MEDICARE

## 2021-11-04 ENCOUNTER — HOSPITAL ENCOUNTER (OUTPATIENT)
Facility: HOSPITAL | Age: 71
Discharge: HOME | End: 2021-11-04
Payer: MEDICARE

## 2021-11-04 DIAGNOSIS — R05.9 COUGH: ICD-10-CM

## 2021-11-04 PROCEDURE — 71046 X-RAY EXAM CHEST 2 VIEWS: CPT

## 2022-04-07 RX ORDER — NITROGLYCERIN 0.4 MG/1
TABLET SUBLINGUAL
Qty: 25 TABLET | Refills: 1 | Status: SHIPPED | OUTPATIENT
Start: 2022-04-07 | End: 2022-05-17

## 2022-04-14 ENCOUNTER — OFFICE VISIT (OUTPATIENT)
Dept: CARDIOLOGY | Facility: CLINIC | Age: 72
End: 2022-04-14

## 2022-04-14 VITALS
HEART RATE: 88 BPM | WEIGHT: 133 LBS | HEIGHT: 67 IN | SYSTOLIC BLOOD PRESSURE: 142 MMHG | BODY MASS INDEX: 20.88 KG/M2 | OXYGEN SATURATION: 89 % | DIASTOLIC BLOOD PRESSURE: 60 MMHG

## 2022-04-14 DIAGNOSIS — I25.10 CORONARY ARTERY DISEASE INVOLVING NATIVE CORONARY ARTERY OF NATIVE HEART WITHOUT ANGINA PECTORIS: Primary | ICD-10-CM

## 2022-04-14 DIAGNOSIS — I65.23 BILATERAL CAROTID ARTERY STENOSIS: ICD-10-CM

## 2022-04-14 DIAGNOSIS — E78.00 HYPERCHOLESTEROLEMIA: ICD-10-CM

## 2022-04-14 PROCEDURE — 99214 OFFICE O/P EST MOD 30 MIN: CPT | Performed by: INTERNAL MEDICINE

## 2022-04-14 RX ORDER — IPRATROPIUM BROMIDE AND ALBUTEROL SULFATE 2.5; .5 MG/3ML; MG/3ML
SOLUTION RESPIRATORY (INHALATION)
COMMUNITY
Start: 2022-03-09

## 2022-04-14 NOTE — PROGRESS NOTES
[facility]  Office visit  Geronimo Pizano  1950  [phone]    VISIT DATE:  4/14/2022      PCP: Johana Garcia APRN  [address] [place] [id number]    CC:  Chief Complaint   Patient presents with   • Coronary Artery Disease   • Shortness of Breath       PROBLEM LIST:  1. Coronary artery disease:  a. Remote bare-metal stent, April 1999.  b. Recurrent stenting of left anterior descending coronary artery in 2003.  c. Cardiac catheterization in August 2007, revealing normal EF with 25% in-stent stenosis in the left anterior descending coronary artery and 50% stenosis in the first obtuse marginal branch of the circumflex.  d. Left heart catheterization, 06/16/2009: 25% in-stent restenosis of the LAD, normal EF.  e. Normal nuclear perfusion study, 03/19/2015, Nora Guzmán MD, revealing no evidence of reducible ischemia, normal LV systolic function and wall motion.   f. Echo 10/2018       -Ejection fraction is visually estimated to be 60-65 %.        -Mild concentric left ventricular hypertrophy is present.        -Diastolic filling parameters suggests grade I diastolic dysfunction .        -Mild-to-moderate aortic regurgitation is present.      G. cardiac catheterization January 18, 2019  · 90% proximal LAD stenosis treated with 3.0 x 12 mm Debbie drug-eluting stent  · Patent proximal and mid LAD stents.  · 90% distal LAD stenosis  · 30-40% distal left main stenosis  · Elevated LVEDP  · No aortic stenosis  2. Aortic insufficiency, mild-moderate  3. Carotid artery disease:  a. Asymptomatic.  b. Carotid duplex, 08/11/2012: 40% to 60% stenosis of the left ICA, less than 40% of the right ICA.  c. Carotid ultrasound, 10/04/2013: No change from previous exams, with 30% stenosis on the right carotid artery and 50% on the left.  d. March 2018:1. Right internal carotid artery estimated diameter reduction:  20-49 %.  2. Left internal carotid artery estimated diameter  reduction:  20-49 %. However, the ICA:ECA ratio is elevated suggesting that  this may represent a 50-69% stenosis similar to that seen on the  study of 12/27/2016.  4. Palpitations: Holter January 2018-frequent premature atrial contractions and premature ventricular contractions.  5. Hypercholesterolemia.  6. Chronic back pain.  7. Recent smoking cessation.  8. Mild anxiety.  9. Acid reflux.  10. Chronic obstructive pulmonary disease.  11. Surgical history: Remote appendectomy.    June 2020 bilateral carotid duplex  1. Right internal carotid artery estimated diameter reduction: 50-69 %.   2. Left internal carotid artery estimated diameter reduction: 50-69 %.   3. Vertebral arteries demonstrate bilateral antegrade flow.       ASSESSMENT:   Diagnosis Plan   1. Coronary artery disease involving native coronary artery of native heart without angina pectoris     2. Bilateral carotid artery stenosis     3. Hypercholesterolemia         PLAN:  Coronary artery disease:  Currently with rare episodes of mild angina.  Continue as needed sublingual nitroglycerin.    Previously refused noninvasive evaluation when he was having atypical chest pain.  Previously intolerant to high intensity statin therapy to include atorvastatin, simvastatin and rosuvastatin.  Continue current medical therapy.    Hypercholesterolemia: Goal LDL less than 70.  Continue pravastatin.  Previously intolerant to higher intensity statin therapy.     Peripheral vascular disease: Moderate bilateral carotid atherosclerosis.  Asymptomatic.  Continue medical therapy and annual duplex imaging.     Nicotine addiction: Counseled on need for smoking cessation.    Subjective  Stopped smoking 2 months ago.  Blood pressures running less than 140/90 mmHg.   Denies palpitations.  No myalgias on statin therapy.  Ports intermittent episodes of mild precordial chest discomfort which are relieved with sublingual nitroglycerin, occurring about once every 3 to 4  "months.    PHYSICAL EXAMINATION:  Vitals:    04/14/22 1428   BP: 142/60   BP Location: Right arm   Patient Position: Sitting   Pulse: 88   SpO2: (!) 89%   Weight: 60.3 kg (133 lb)   Height: 170.2 cm (67\")     General Appearance:    Alert, cooperative, no distress, appears stated age   Head:    Normocephalic, without obvious abnormality, atraumatic   Eyes:    conjunctiva/corneas clear   Nose:   Nares normal, septum midline, mucosa normal, no drainage   Throat:   Lips, teeth and gums normal   Neck:   Supple, symmetrical, trachea midline, soft left carotid bruit   Lungs:     Somewhat diminished throughout, scattered expiratory wheezing, respirations unlabored   Chest Wall:    No tenderness or deformity    Heart:    Regular rate and rhythm, S1 and S2 normal, no murmur, rub   or gallop, normal carotid impulse bilaterally without bruit.   Abdomen:     Soft, non-tender   Extremities:   Extremities normal, atraumatic, no cyanosis or edema   Pulses:   2+ and symmetric all extremities   Skin:   Skin color, texture, turgor normal, no rashes or lesions       Diagnostic Data:  Procedures  Lab Results   Component Value Date    CHLPL 148 08/10/2021    TRIG 73 08/10/2021    HDL 59 08/10/2021     Lab Results   Component Value Date    GLUCOSE 127 (H) 01/18/2019    BUN 7 (L) 01/18/2019    CREATININE 0.81 01/18/2019     01/18/2019    K 4.3 01/18/2019     01/18/2019    CO2 29.0 01/18/2019     Lab Results   Component Value Date    HGBA1C 5.9 05/19/2021     Lab Results   Component Value Date    WBC 4.5 01/06/2021    HGB 16.4 01/06/2021    HCT 50.1 01/06/2021     01/06/2021       Allergies  Allergies   Allergen Reactions   • Metoprolol Shortness Of Breath   • Cefdinir Unknown - Low Severity     Hallucination, weird dreams   • Cetirizine      Insomnia   • Doxycycline Diarrhea     Stated he thought he was going to die. Bloody diarrhea and sores on his legs.   • Fluoxetine      Dizziness and stomach pain   • " Oxycodone-Acetaminophen Other (See Comments)   • Plavix [Clopidogrel Bisulfate] Other (See Comments)     Dyspepsia     • Promethazine      itching   • Rosuvastatin Other (See Comments)     Myalgias   • Sulfa Antibiotics      Oral blisters, diarrhea   • Varenicline Swelling     Throat swelling   • Penicillins Rash       Current Medications    Current Outpatient Medications:   •  albuterol (PROVENTIL HFA;VENTOLIN HFA) 108 (90 Base) MCG/ACT inhaler, Inhale 2 puffs Every 4 (Four) Hours As Needed for Wheezing., Disp: , Rfl:   •  ALPRAZolam (XANAX) 1 MG tablet, Take 1 mg by mouth 2 (Two) Times a Day As Needed for Anxiety., Disp: , Rfl:   •  aspirin 81 MG EC tablet, Take 81 mg by mouth Daily., Disp: , Rfl:   •  citalopram (CeleXA) 40 MG tablet, Take 40 mg by mouth Daily., Disp: , Rfl: 5  •  cyclobenzaprine (FLEXERIL) 10 MG tablet, Take 10 mg by mouth Daily., Disp: , Rfl:   •  fluticasone (FLONASE) 50 MCG/ACT nasal spray, 2 sprays into the nostril(s) as directed by provider As Needed., Disp: , Rfl:   •  gabapentin (NEURONTIN) 600 MG tablet, Take 600 mg by mouth 4 (Four) Times a Day., Disp: , Rfl:   •  guaiFENesin-codeine (ROMILAR-AC) 100-10 MG/5ML syrup, TAKE 5 MLS BY MOUTH EVERY 4 HOURS AS NEEDED FOR COUGH FOR UP TO 7 DAYS., Disp: , Rfl:   •  HYDROcodone-acetaminophen (NORCO)  MG per tablet, Take 1 tablet by mouth 4 (Four) Times a Day As Needed for Moderate Pain ., Disp: , Rfl:   •  ipratropium-albuterol (DUO-NEB) 0.5-2.5 mg/3 ml nebulizer, INHALE 1 THE CONTENTS OF 1 VIAL USING NEBULIZER TWO TIMES A DAY AS NEEDED, Disp: , Rfl:   •  nitroglycerin (NITROSTAT) 0.4 MG SL tablet, DISSOLVE 1 TABLET UNDER THE TONGUE AS NEEDED FOR CHEST PAIN. MAY REPEAT FOR 3 DOSES AT 5 MINUTE INTERVALS. IF NO RELIEF GO TO ER., Disp: 25 tablet, Rfl: 1  •  O2 (OXYGEN), Inhale 2 L/min As Needed., Disp: , Rfl:   •  pravastatin (PRAVACHOL) 40 MG tablet, Take 40 mg by mouth Daily., Disp: , Rfl:   •  tamsulosin (FLOMAX) 0.4 MG capsule 24 hr  capsule, Take 1 capsule by mouth Every Night., Disp: , Rfl:   •  ticagrelor (Brilinta) 90 MG tablet tablet, Take 90 mg by mouth 2 (Two) Times a Day., Disp: , Rfl:   •  traZODone (DESYREL) 100 MG tablet, Take 100 mg by mouth Every Night. 2 tabs at night, Disp: , Rfl:   •  Fluticasone Furoate-Vilanterol (Breo Ellipta) 100-25 MCG/INH inhaler, Inhale 1 puff Daily., Disp: , Rfl:           ROS  Review of Systems   Cardiovascular: Positive for dyspnea on exertion and irregular heartbeat. Negative for chest pain and leg swelling.   Respiratory: Positive for cough, shortness of breath and snoring. Negative for wheezing.    Neurological: Positive for dizziness.          SOCIAL HX  Social History     Socioeconomic History   • Marital status:    Tobacco Use   • Smoking status: Former Smoker     Packs/day: 0.00     Types: Cigars     Quit date: 2022     Years since quittin.1   • Smokeless tobacco: Never Used   • Tobacco comment: 3 cigars daily   Vaping Use   • Vaping Use: Never used   Substance and Sexual Activity   • Alcohol use: No   • Drug use: No   • Sexual activity: Defer       FAMILY HX  Family History   Problem Relation Age of Onset   • Diabetes Father    • Heart disease Father    • Stroke Brother    • Coronary artery disease Other    • Diabetes Other    • Diabetes Mother    • Leukemia Sister    • Heart failure Sister    • Leukemia Daughter              Derek Jackson III, MD, [facility]

## 2022-05-17 RX ORDER — NITROGLYCERIN 0.4 MG/1
TABLET SUBLINGUAL
Qty: 25 TABLET | Refills: 1 | Status: SHIPPED | OUTPATIENT
Start: 2022-05-17

## 2022-10-10 ENCOUNTER — HOSPITAL ENCOUNTER (OUTPATIENT)
Dept: ULTRASOUND IMAGING | Facility: HOSPITAL | Age: 72
Discharge: HOME | End: 2022-10-10
Payer: MEDICARE

## 2022-10-10 DIAGNOSIS — R10.11 RIGHT UPPER QUADRANT PAIN: ICD-10-CM

## 2022-10-10 PROCEDURE — 76705 ECHO EXAM OF ABDOMEN: CPT

## 2023-05-25 ENCOUNTER — OFFICE VISIT (OUTPATIENT)
Dept: CARDIOLOGY | Facility: CLINIC | Age: 73
End: 2023-05-25
Payer: MEDICARE

## 2023-05-25 VITALS
WEIGHT: 133 LBS | OXYGEN SATURATION: 93 % | HEIGHT: 67 IN | HEART RATE: 81 BPM | BODY MASS INDEX: 20.88 KG/M2 | DIASTOLIC BLOOD PRESSURE: 50 MMHG | SYSTOLIC BLOOD PRESSURE: 102 MMHG

## 2023-05-25 DIAGNOSIS — I25.10 CORONARY ARTERY DISEASE INVOLVING NATIVE CORONARY ARTERY OF NATIVE HEART WITHOUT ANGINA PECTORIS: Primary | ICD-10-CM

## 2023-05-25 DIAGNOSIS — I65.23 BILATERAL CAROTID ARTERY STENOSIS: ICD-10-CM

## 2023-05-25 DIAGNOSIS — E78.00 HYPERCHOLESTEROLEMIA: ICD-10-CM

## 2023-05-25 PROCEDURE — 99214 OFFICE O/P EST MOD 30 MIN: CPT | Performed by: INTERNAL MEDICINE

## 2023-05-25 NOTE — PROGRESS NOTES
[facility]  Office visit  Geronimo Pizano  1950  [phone]    VISIT DATE:  5/25/2023      PCP: Johana Garcia APRN  [address] [place] [id number]    CC:  Chief Complaint   Patient presents with   • Coronary Artery Disease   • Shortness of Breath       PROBLEM LIST:  1. Coronary artery disease:  a. Remote bare-metal stent, April 1999.  b. Recurrent stenting of left anterior descending coronary artery in 2003.  c. Cardiac catheterization in August 2007, revealing normal EF with 25% in-stent stenosis in the left anterior descending coronary artery and 50% stenosis in the first obtuse marginal branch of the circumflex.  d. Left heart catheterization, 06/16/2009: 25% in-stent restenosis of the LAD, normal EF.  e. Normal nuclear perfusion study, 03/19/2015, Nora Guzmán MD, revealing no evidence of reducible ischemia, normal LV systolic function and wall motion.   f. Echo 10/2018       -Ejection fraction is visually estimated to be 60-65 %.        -Mild concentric left ventricular hypertrophy is present.        -Diastolic filling parameters suggests grade I diastolic dysfunction .        -Mild-to-moderate aortic regurgitation is present.      G. cardiac catheterization January 18, 2019  · 90% proximal LAD stenosis treated with 3.0 x 12 mm Debbie drug-eluting stent  · Patent proximal and mid LAD stents.  · 90% distal LAD stenosis  · 30-40% distal left main stenosis  · Elevated LVEDP  · No aortic stenosis  2. Aortic insufficiency, mild-moderate  3. Carotid artery disease:  a. Asymptomatic.  b. Carotid duplex, 08/11/2012: 40% to 60% stenosis of the left ICA, less than 40% of the right ICA.  c. Carotid ultrasound, 10/04/2013: No change from previous exams, with 30% stenosis on the right carotid artery and 50% on the left.  d. March 2018:1. Right internal carotid artery estimated diameter reduction:  20-49 %.  2. Left internal carotid artery estimated diameter  reduction:  20-49 %. However, the ICA:ECA ratio is elevated suggesting that  this may represent a 50-69% stenosis similar to that seen on the  study of 12/27/2016.  4. Palpitations: Holter January 2018-frequent premature atrial contractions and premature ventricular contractions.  5. Hypercholesterolemia.  6. Chronic back pain.  7. Recent smoking cessation.  8. Mild anxiety.  9. Acid reflux.  10. Chronic obstructive pulmonary disease.  11. Surgical history: Remote appendectomy.    June 2020 bilateral carotid duplex  1. Right internal carotid artery estimated diameter reduction: 50-69 %.   2. Left internal carotid artery estimated diameter reduction: 50-69 %.   3. Vertebral arteries demonstrate bilateral antegrade flow.     October 2021 bilateral carotid duplex  1. Right internal carotid artery estimated diameter reduction: 50-69 %.   2. Left internal carotid artery estimated diameter reduction: 70-99 %.   3. Vertebral arteries demonstrate bilateral antegrade flow.     ASSESSMENT:   Diagnosis Plan   1. Coronary artery disease involving native coronary artery of native heart without angina pectoris        2. Bilateral carotid artery stenosis  Duplex Carotid Ultrasound CAR      3. Hypercholesterolemia            PLAN:  Coronary artery disease:  Currently with rare episodes of mild angina.  Continue as needed sublingual nitroglycerin.    Previously refused noninvasive evaluation when he was having atypical chest pain.  Previously intolerant to high intensity statin therapy to include atorvastatin, simvastatin and rosuvastatin.  Continue current medical therapy.    Hypercholesterolemia: Goal LDL less than 70.  Continue pravastatin.  Previously intolerant to higher intensity statin therapy.  We will start PA process for PCSK9 inhibitor.  Most recent .     Peripheral vascular disease: Continue current medical therapy.  Duplex imaging pending.    Nicotine addiction: Counseled on need for smoking  "cessation.    Subjective  Severe COPD.  Stop smoking February 2022.  On home O2.  Blood pressures running less than 140/90 mmHg.   Denies palpitations.  No myalgias on statin therapy.      PHYSICAL EXAMINATION:  Vitals:    05/25/23 1308   BP: 102/50   BP Location: Right arm   Patient Position: Sitting   Pulse: 81   SpO2: 93%   Weight: 60.3 kg (133 lb)   Height: 170.2 cm (67\")     General Appearance:    Alert, cooperative, no distress, appears stated age   Head:    Normocephalic, without obvious abnormality, atraumatic   Eyes:    conjunctiva/corneas clear   Nose:   Nares normal, septum midline, mucosa normal, no drainage   Throat:   Lips, teeth and gums normal   Neck:   Supple, symmetrical, trachea midline, soft left carotid bruit   Lungs:     Somewhat diminished throughout, scattered expiratory wheezing, respirations unlabored   Chest Wall:    No tenderness or deformity    Heart:    Regular rate and rhythm, S1 and S2 normal, no murmur, rub   or gallop, normal carotid impulse bilaterally without bruit.   Abdomen:     Soft, non-tender   Extremities:   Extremities normal, atraumatic, no cyanosis or edema   Pulses:   2+ and symmetric all extremities   Skin:   Skin color, texture, turgor normal, no rashes or lesions       Diagnostic Data:  Procedures  Lab Results   Component Value Date    CHLPL 148 08/10/2021    TRIG 73 08/10/2021    HDL 59 08/10/2021     Lab Results   Component Value Date    GLUCOSE 127 (H) 01/18/2019    BUN 7 (L) 01/18/2019    CREATININE 0.81 01/18/2019     01/18/2019    K 4.3 01/18/2019     01/18/2019    CO2 29.0 01/18/2019     Lab Results   Component Value Date    HGBA1C 5.9 05/19/2021     Lab Results   Component Value Date    WBC 4.5 01/06/2021    HGB 16.4 01/06/2021    HCT 50.1 01/06/2021     01/06/2021       Allergies  Allergies   Allergen Reactions   • Metoprolol Shortness Of Breath   • Cefdinir Unknown - Low Severity     Hallucination, weird dreams   • Cetirizine      Insomnia "   • Doxycycline Diarrhea     Stated he thought he was going to die. Bloody diarrhea and sores on his legs.   • Fluoxetine      Dizziness and stomach pain   • Oxycodone-Acetaminophen Other (See Comments)   • Plavix [Clopidogrel Bisulfate] Other (See Comments)     Dyspepsia     • Promethazine      itching   • Rosuvastatin Other (See Comments)     Myalgias   • Sulfa Antibiotics      Oral blisters, diarrhea   • Varenicline Swelling     Throat swelling   • Penicillins Rash       Current Medications    Current Outpatient Medications:   •  albuterol (PROVENTIL HFA;VENTOLIN HFA) 108 (90 Base) MCG/ACT inhaler, Inhale 2 puffs Every 4 (Four) Hours As Needed for Wheezing., Disp: , Rfl:   •  aspirin 81 MG EC tablet, Take 81 mg by mouth Daily., Disp: , Rfl:   •  citalopram (CeleXA) 40 MG tablet, Take 40 mg by mouth Daily., Disp: , Rfl: 5  •  cyclobenzaprine (FLEXERIL) 10 MG tablet, Take 10 mg by mouth Daily., Disp: , Rfl:   •  fluticasone (FLONASE) 50 MCG/ACT nasal spray, 2 sprays into the nostril(s) as directed by provider As Needed., Disp: , Rfl:   •  Fluticasone Furoate-Vilanterol (Breo Ellipta) 100-25 MCG/INH inhaler, Inhale 1 puff Daily., Disp: , Rfl:   •  gabapentin (NEURONTIN) 600 MG tablet, Take 600 mg by mouth 4 (Four) Times a Day., Disp: , Rfl:   •  guaiFENesin-codeine (ROMILAR-AC) 100-10 MG/5ML syrup, TAKE 5 MLS BY MOUTH EVERY 4 HOURS AS NEEDED FOR COUGH FOR UP TO 7 DAYS., Disp: , Rfl:   •  HYDROcodone-acetaminophen (NORCO)  MG per tablet, Take 1 tablet by mouth 4 (Four) Times a Day As Needed for Moderate Pain ., Disp: , Rfl:   •  ipratropium-albuterol (DUO-NEB) 0.5-2.5 mg/3 ml nebulizer, INHALE 1 THE CONTENTS OF 1 VIAL USING NEBULIZER TWO TIMES A DAY AS NEEDED, Disp: , Rfl:   •  nitroglycerin (NITROSTAT) 0.4 MG SL tablet, DISSOLVE 1 TABLET UNDER THE TONGUE AS NEEDED FOR CHEST PAIN. MAY REPEAT FOR 3 DOSES AT 5 MINUTE INTERVALS. IF NO RELIEF GO TO ER., Disp: 25 tablet, Rfl: 1  •  O2 (OXYGEN), Inhale 2 L/min As  Needed., Disp: , Rfl:   •  pravastatin (PRAVACHOL) 40 MG tablet, Take 40 mg by mouth Daily., Disp: , Rfl:   •  tamsulosin (FLOMAX) 0.4 MG capsule 24 hr capsule, Take 1 capsule by mouth Every Night., Disp: , Rfl:   •  traZODone (DESYREL) 100 MG tablet, Take 100 mg by mouth Every Night. 2 tabs at night, Disp: , Rfl:           ROS  Review of Systems   Cardiovascular: Positive for dyspnea on exertion and irregular heartbeat. Negative for chest pain and leg swelling.   Respiratory: Positive for cough, shortness of breath and snoring. Negative for wheezing.    Neurological: Positive for dizziness.          SOCIAL HX  Social History     Socioeconomic History   • Marital status:    Tobacco Use   • Smoking status: Former     Packs/day: 0.00     Types: Cigars, Cigarettes     Quit date: 2022     Years since quittin.3   • Smokeless tobacco: Never   • Tobacco comments:     3 cigars daily   Vaping Use   • Vaping Use: Never used   Substance and Sexual Activity   • Alcohol use: No   • Drug use: No   • Sexual activity: Defer       FAMILY HX  Family History   Problem Relation Age of Onset   • Diabetes Father    • Heart disease Father    • Stroke Brother    • Coronary artery disease Other    • Diabetes Other    • Diabetes Mother    • Leukemia Sister    • Heart failure Sister    • Leukemia Daughter              Derek Jackson III, MD, FACC       no 125

## 2023-05-26 ENCOUNTER — TELEPHONE (OUTPATIENT)
Dept: CARDIOLOGY | Facility: CLINIC | Age: 73
End: 2023-05-26
Payer: MEDICARE

## 2023-05-26 NOTE — TELEPHONE ENCOUNTER
PA initiated on [employer]. PA Case # [id number]. Key # [id number]. Approved  today. Coverage starts from 1/1/23 to 12/31/23.Patient notified. He will check with Pharmacy for pricing and availability.

## 2023-05-31 ENCOUNTER — HOSPITAL ENCOUNTER (OUTPATIENT)
Dept: ULTRASOUND IMAGING | Facility: HOSPITAL | Age: 73
Discharge: HOME | End: 2023-05-31
Payer: MEDICARE

## 2023-05-31 DIAGNOSIS — I65.23 BILATERAL CAROTID ARTERY STENOSIS: ICD-10-CM

## 2023-05-31 PROCEDURE — 93880 EXTRACRANIAL BILAT STUDY: CPT

## 2024-01-02 RX ORDER — EVOLOCUMAB 140 MG/ML
INJECTION, SOLUTION SUBCUTANEOUS
Qty: 2 ML | Refills: 5 | Status: SHIPPED | OUTPATIENT
Start: 2024-01-02

## 2024-01-25 ENCOUNTER — OFFICE VISIT (OUTPATIENT)
Dept: CARDIOLOGY | Facility: CLINIC | Age: 74
End: 2024-01-25
Payer: MEDICARE

## 2024-01-25 VITALS
BODY MASS INDEX: 19.85 KG/M2 | HEIGHT: 68 IN | HEART RATE: 79 BPM | SYSTOLIC BLOOD PRESSURE: 134 MMHG | OXYGEN SATURATION: 93 % | DIASTOLIC BLOOD PRESSURE: 54 MMHG | WEIGHT: 131 LBS

## 2024-01-25 DIAGNOSIS — I65.23 BILATERAL CAROTID ARTERY STENOSIS: ICD-10-CM

## 2024-01-25 DIAGNOSIS — I25.10 CORONARY ARTERY DISEASE INVOLVING NATIVE CORONARY ARTERY OF NATIVE HEART WITHOUT ANGINA PECTORIS: Primary | ICD-10-CM

## 2024-01-25 DIAGNOSIS — E78.00 HYPERCHOLESTEROLEMIA: ICD-10-CM

## 2024-01-25 PROCEDURE — 99214 OFFICE O/P EST MOD 30 MIN: CPT | Performed by: INTERNAL MEDICINE

## 2024-01-25 RX ORDER — PRAVASTATIN SODIUM 80 MG/1
80 TABLET ORAL DAILY
Qty: 90 TABLET | Refills: 3 | Status: SHIPPED | OUTPATIENT
Start: 2024-01-25

## 2024-01-25 NOTE — PROGRESS NOTES
[facility]  Office visit  Geronimo Pizano  1950  [phone]    VISIT DATE:  1/25/2024      PCP: Johana Garcia APRN  [address] [place] [id number]    CC:  Chief Complaint   Patient presents with    Coronary Artery Disease       PROBLEM LIST:  Coronary artery disease:  Remote bare-metal stent, April 1999.  Recurrent stenting of left anterior descending coronary artery in 2003.  Cardiac catheterization in August 2007, revealing normal EF with 25% in-stent stenosis in the left anterior descending coronary artery and 50% stenosis in the first obtuse marginal branch of the circumflex.  Left heart catheterization, 06/16/2009: 25% in-stent restenosis of the LAD, normal EF.  Normal nuclear perfusion study, 03/19/2015, Nora Guzmán MD, revealing no evidence of reducible ischemia, normal LV systolic function and wall motion.   Echo 10/2018       -Ejection fraction is visually estimated to be 60-65 %.        -Mild concentric left ventricular hypertrophy is present.        -Diastolic filling parameters suggests grade I diastolic dysfunction .        -Mild-to-moderate aortic regurgitation is present.      G. cardiac catheterization January 18, 2019  90% proximal LAD stenosis treated with 3.0 x 12 mm Debbie drug-eluting stent  Patent proximal and mid LAD stents.  90% distal LAD stenosis  30-40% distal left main stenosis  Elevated LVEDP  No aortic stenosis  Aortic insufficiency, mild-moderate  Carotid artery disease:  Asymptomatic.  Carotid duplex, 08/11/2012: 40% to 60% stenosis of the left ICA, less than 40% of the right ICA.  Carotid ultrasound, 10/04/2013: No change from previous exams, with 30% stenosis on the right carotid artery and 50% on the left.  March 2018:1. Right internal carotid artery estimated diameter reduction:  20-49 %.  2. Left internal carotid artery estimated diameter reduction:  20-49 %. However, the ICA:ECA ratio is elevated suggesting that  this may  represent a 50-69% stenosis similar to that seen on the  study of 12/27/2016.  Palpitations: Holter January 2018-frequent premature atrial contractions and premature ventricular contractions.  Hypercholesterolemia.  Chronic back pain.  Recent smoking cessation.  Mild anxiety.  Acid reflux.  Chronic obstructive pulmonary disease.  Surgical history: Remote appendectomy.    June 2020 bilateral carotid duplex  1. Right internal carotid artery estimated diameter reduction: 50-69 %.   2. Left internal carotid artery estimated diameter reduction: 50-69 %.   3. Vertebral arteries demonstrate bilateral antegrade flow.     October 2021 bilateral carotid duplex  1. Right internal carotid artery estimated diameter reduction: 50-69 %.   2. Left internal carotid artery estimated diameter reduction: 70-99 %.   3. Vertebral arteries demonstrate bilateral antegrade flow.     May 2023 bilateral carotid duplex  1. Right internal carotid artery estimated diameter reduction: 50-69 %.   2. Left internal carotid artery estimated diameter reduction: 70-99 %.   3. Vertebral arteries demonstrate bilateral antegrade flow.     ASSESSMENT:   Diagnosis Plan   1. Coronary artery disease involving native coronary artery of native heart without angina pectoris        2. Bilateral carotid artery stenosis        3. Hypercholesterolemia              PLAN:  Coronary artery disease:  Currently with rare episodes of mild angina.  Continue as needed sublingual nitroglycerin.    Previously refused noninvasive evaluation when he was having atypical chest pain.  Previously intolerant to high intensity statin therapy to include atorvastatin, simvastatin and rosuvastatin.  Continue current medical therapy.    Hypercholesterolemia: Goal LDL less than 70.  Continue pravastatin 80 mg p.o. daily.  Previously intolerant to higher intensity statin therapy.  Intolerant to PCSK9 inhibitor.    Peripheral vascular disease: Continue current medical therapy.  Significant  "left internal carotid artery stenosis, currently asymptomatic.  Given clinical stability of medical therapy and overall frailty I am not recommending evaluating for percutaneous revascularization at this time.    Subjective  Severe COPD on 2 L home O2.  Stop smoking February 2022.  Blood pressures running less than 140/90 mmHg.   Denies palpitations.  No myalgias on statin therapy, recently increased his pravastatin to 80 mg p.o. daily.  Developed abdominal discomfort with PCSK9 inhibitor, reported that his abdomen felt like cardboard for about a week after each injection.    PHYSICAL EXAMINATION:  Vitals:    01/25/24 0917   Height: 172.7 cm (68\")     General Appearance:    Alert, cooperative, no distress, appears stated age   Head:    Normocephalic, without obvious abnormality, atraumatic   Eyes:    conjunctiva/corneas clear   Nose:   Nares normal, septum midline, mucosa normal, no drainage   Throat:   Lips, teeth and gums normal   Neck:   Supple, symmetrical, trachea midline, soft left carotid bruit   Lungs:     Somewhat diminished throughout, scattered expiratory wheezing, respirations unlabored   Chest Wall:    No tenderness or deformity    Heart:    Regular rate and rhythm, S1 and S2 normal, no murmur, rub   or gallop, normal carotid impulse bilaterally without bruit.   Abdomen:     Soft, non-tender   Extremities:   Extremities normal, atraumatic, no cyanosis or edema   Pulses:   2+ and symmetric all extremities   Skin:   Skin color, texture, turgor normal, no rashes or lesions       Diagnostic Data:  Procedures  Lab Results   Component Value Date    CHLPL 148 08/10/2021    TRIG 73 08/10/2021    HDL 59 08/10/2021     Lab Results   Component Value Date    GLUCOSE 127 (H) 01/18/2019    BUN 7 (L) 01/18/2019    CREATININE 0.81 01/18/2019     01/18/2019    K 4.3 01/18/2019     01/18/2019    CO2 29.0 01/18/2019     Lab Results   Component Value Date    HGBA1C 5.9 05/19/2021     Lab Results   Component " Value Date    WBC 4.5 01/06/2021    HGB 16.4 01/06/2021    HCT 50.1 01/06/2021     01/06/2021       Allergies  Allergies   Allergen Reactions    Metoprolol Shortness Of Breath    Cefdinir Unknown - Low Severity     Hallucination, weird dreams    Cetirizine      Insomnia    Doxycycline Diarrhea     Stated he thought he was going to die. Bloody diarrhea and sores on his legs.    Fluoxetine      Dizziness and stomach pain    Oxycodone-Acetaminophen Other (See Comments)    Plavix [Clopidogrel Bisulfate] Other (See Comments)     Dyspepsia      Promethazine      itching    Rosuvastatin Other (See Comments)     Myalgias    Sulfa Antibiotics      Oral blisters, diarrhea    Varenicline Swelling     Throat swelling    Penicillins Rash       Current Medications    Current Outpatient Medications:     albuterol (PROVENTIL HFA;VENTOLIN HFA) 108 (90 Base) MCG/ACT inhaler, Inhale 2 puffs Every 4 (Four) Hours As Needed for Wheezing., Disp: , Rfl:     citalopram (CeleXA) 40 MG tablet, Take 1 tablet by mouth Daily., Disp: , Rfl: 5    cyclobenzaprine (FLEXERIL) 10 MG tablet, Take 1 tablet by mouth Daily., Disp: , Rfl:     fluticasone (FLONASE) 50 MCG/ACT nasal spray, 2 sprays into the nostril(s) as directed by provider As Needed., Disp: , Rfl:     gabapentin (NEURONTIN) 600 MG tablet, Take 1 tablet by mouth 4 (Four) Times a Day., Disp: , Rfl:     aspirin 81 MG EC tablet, Take 81 mg by mouth Daily., Disp: , Rfl:     Fluticasone Furoate-Vilanterol (Breo Ellipta) 100-25 MCG/INH inhaler, Inhale 1 puff Daily., Disp: , Rfl:     HYDROcodone-acetaminophen (NORCO)  MG per tablet, Take 1 tablet by mouth 4 (Four) Times a Day As Needed for Moderate Pain ., Disp: , Rfl:     ipratropium-albuterol (DUO-NEB) 0.5-2.5 mg/3 ml nebulizer, INHALE 1 THE CONTENTS OF 1 VIAL USING NEBULIZER TWO TIMES A DAY AS NEEDED, Disp: , Rfl:     nitroglycerin (NITROSTAT) 0.4 MG SL tablet, DISSOLVE 1 TABLET UNDER THE TONGUE AS NEEDED FOR CHEST PAIN. MAY REPEAT  FOR 3 DOSES AT 5 MINUTE INTERVALS. IF NO RELIEF GO TO ER., Disp: 25 tablet, Rfl: 1    O2 (OXYGEN), Inhale 2 L/min As Needed., Disp: , Rfl:     pravastatin (PRAVACHOL) 40 MG tablet, Take 40 mg by mouth Daily., Disp: , Rfl:     Repatha SureClick solution auto-injector SureClick injection, INJECT 1 ML UNDER THE SKIN INTO THE APPROPRIATE AREA AS DIRECTED EVERY 14 (FOURTEEN) DAYS., Disp: 2 mL, Rfl: 5    tamsulosin (FLOMAX) 0.4 MG capsule 24 hr capsule, Take 1 capsule by mouth Every Night., Disp: , Rfl:     traZODone (DESYREL) 100 MG tablet, Take 100 mg by mouth Every Night. 2 tabs at night, Disp: , Rfl:           ROS  Review of Systems   Cardiovascular:  Positive for dyspnea on exertion and irregular heartbeat. Negative for chest pain and leg swelling.   Respiratory:  Positive for cough, shortness of breath and snoring. Negative for wheezing.    Neurological:  Positive for dizziness.          SOCIAL HX  Social History     Socioeconomic History    Marital status:    Tobacco Use    Smoking status: Former     Packs/day: 0     Types: Cigars, Cigarettes     Quit date: 2022     Years since quittin.9    Smokeless tobacco: Never    Tobacco comments:     3 cigars daily   Vaping Use    Vaping Use: Never used   Substance and Sexual Activity    Alcohol use: No    Drug use: No    Sexual activity: Defer       FAMILY HX  Family History   Problem Relation Age of Onset    Diabetes Father     Heart disease Father     Stroke Brother     Coronary artery disease Other     Diabetes Other     Diabetes Mother     Leukemia Sister     Heart failure Sister     Leukemia Daughter              Derek Jackson III, MD, FACC

## 2024-04-10 ENCOUNTER — HOSPITAL ENCOUNTER (OUTPATIENT)
Dept: GENERAL RADIOLOGY | Facility: HOSPITAL | Age: 74
Discharge: HOME | End: 2024-04-10
Payer: MEDICARE

## 2024-04-10 ENCOUNTER — HOSPITAL ENCOUNTER (OUTPATIENT)
Facility: HOSPITAL | Age: 74
Discharge: HOME | End: 2024-04-10
Payer: MEDICARE

## 2024-04-10 DIAGNOSIS — M54.50 LOW BACK PAIN AT MULTIPLE SITES: ICD-10-CM

## 2024-04-10 DIAGNOSIS — M54.9 MID BACK PAIN: ICD-10-CM

## 2024-04-10 DIAGNOSIS — M54.2 NECK PAIN: ICD-10-CM

## 2024-04-10 PROCEDURE — 72070 X-RAY EXAM THORAC SPINE 2VWS: CPT

## 2024-04-10 PROCEDURE — 72110 X-RAY EXAM L-2 SPINE 4/>VWS: CPT

## 2024-04-10 PROCEDURE — 72050 X-RAY EXAM NECK SPINE 4/5VWS: CPT

## 2024-07-25 ENCOUNTER — TRANSCRIBE ORDERS (OUTPATIENT)
Facility: HOSPITAL | Age: 74
End: 2024-07-25

## 2024-07-25 ENCOUNTER — OFFICE VISIT (OUTPATIENT)
Dept: CARDIOLOGY | Facility: CLINIC | Age: 74
End: 2024-07-25
Payer: MEDICARE

## 2024-07-25 VITALS
WEIGHT: 137 LBS | SYSTOLIC BLOOD PRESSURE: 108 MMHG | HEART RATE: 79 BPM | HEIGHT: 67 IN | OXYGEN SATURATION: 90 % | BODY MASS INDEX: 21.5 KG/M2 | DIASTOLIC BLOOD PRESSURE: 40 MMHG

## 2024-07-25 DIAGNOSIS — E78.00 HYPERCHOLESTEROLEMIA: ICD-10-CM

## 2024-07-25 DIAGNOSIS — I25.10 CORONARY ARTERY DISEASE INVOLVING NATIVE CORONARY ARTERY OF NATIVE HEART WITHOUT ANGINA PECTORIS: Primary | ICD-10-CM

## 2024-07-25 DIAGNOSIS — I65.23 BILATERAL CAROTID ARTERY STENOSIS: ICD-10-CM

## 2024-07-25 DIAGNOSIS — I65.23 BILATERAL CAROTID ARTERY STENOSIS: Primary | ICD-10-CM

## 2024-07-25 RX ORDER — FUROSEMIDE 20 MG/1
20 TABLET ORAL DAILY PRN
COMMUNITY
Start: 2024-07-22

## 2024-07-25 RX ORDER — OXYCODONE HYDROCHLORIDE 10 MG/1
TABLET ORAL
COMMUNITY
Start: 2024-07-03

## 2024-07-25 RX ORDER — FERROUS SULFATE 325(65) MG
325 TABLET ORAL
COMMUNITY

## 2024-07-25 RX ORDER — OMEPRAZOLE 40 MG/1
CAPSULE, DELAYED RELEASE ORAL
COMMUNITY
Start: 2024-07-18

## 2024-07-25 NOTE — PROGRESS NOTES
[facility]  Office visit  Geronimo Pizano  1950  [phone]    VISIT DATE:  7/25/2024      PCP: Johana Garcia APRN  [address] [place] [id number]    CC:  Chief Complaint   Patient presents with    Coronary Artery Disease    Shortness of Breath    Dizziness       PROBLEM LIST:  Coronary artery disease:  Remote bare-metal stent, April 1999.  Recurrent stenting of left anterior descending coronary artery in 2003.  Cardiac catheterization in August 2007, revealing normal EF with 25% in-stent stenosis in the left anterior descending coronary artery and 50% stenosis in the first obtuse marginal branch of the circumflex.  Left heart catheterization, 06/16/2009: 25% in-stent restenosis of the LAD, normal EF.  Normal nuclear perfusion study, 03/19/2015, Nora Guzmán MD, revealing no evidence of reducible ischemia, normal LV systolic function and wall motion.   Echo 10/2018       -Ejection fraction is visually estimated to be 60-65 %.        -Mild concentric left ventricular hypertrophy is present.        -Diastolic filling parameters suggests grade I diastolic dysfunction .        -Mild-to-moderate aortic regurgitation is present.      G. cardiac catheterization January 18, 2019  90% proximal LAD stenosis treated with 3.0 x 12 mm Debbie drug-eluting stent  Patent proximal and mid LAD stents.  90% distal LAD stenosis  30-40% distal left main stenosis  Elevated LVEDP  No aortic stenosis  Aortic insufficiency, mild-moderate  Carotid artery disease:  Asymptomatic.  Carotid duplex, 08/11/2012: 40% to 60% stenosis of the left ICA, less than 40% of the right ICA.  Carotid ultrasound, 10/04/2013: No change from previous exams, with 30% stenosis on the right carotid artery and 50% on the left.  March 2018:1. Right internal carotid artery estimated diameter reduction:  20-49 %.  2. Left internal carotid artery estimated diameter reduction:  20-49 %. However, the ICA:ECA ratio is  elevated suggesting that  this may represent a 50-69% stenosis similar to that seen on the  study of 12/27/2016.  Palpitations: Holter January 2018-frequent premature atrial contractions and premature ventricular contractions.  Hypercholesterolemia.  Chronic back pain.  Recent smoking cessation.  Mild anxiety.  Acid reflux.  Chronic obstructive pulmonary disease.  Surgical history: Remote appendectomy.    June 2020 bilateral carotid duplex  1. Right internal carotid artery estimated diameter reduction: 50-69 %.   2. Left internal carotid artery estimated diameter reduction: 50-69 %.   3. Vertebral arteries demonstrate bilateral antegrade flow.     October 2021 bilateral carotid duplex  1. Right internal carotid artery estimated diameter reduction: 50-69 %.   2. Left internal carotid artery estimated diameter reduction: 70-99 %.   3. Vertebral arteries demonstrate bilateral antegrade flow.     May 2023 bilateral carotid duplex  1. Right internal carotid artery estimated diameter reduction: 50-69 %.   2. Left internal carotid artery estimated diameter reduction: 70-99 %.   3. Vertebral arteries demonstrate bilateral antegrade flow.     ASSESSMENT:   Diagnosis Plan   1. Coronary artery disease involving native coronary artery of native heart without angina pectoris        2. Bilateral carotid artery stenosis        3. Hypercholesterolemia              PLAN:  Coronary artery disease:  Currently with rare episodes of mild angina.  Continue as needed sublingual nitroglycerin.    Previously refused noninvasive evaluation when he was having atypical chest pain.  Previously intolerant to high intensity statin therapy to include atorvastatin, simvastatin and rosuvastatin.  Continue current medical therapy.    Hypercholesterolemia: Goal LDL less than 70.  Continue pravastatin 80 mg p.o. daily.  Previously intolerant to higher intensity statin therapy.  Intolerant to PCSK9 inhibitor.    Peripheral vascular disease: Continue  "current medical therapy.  Significant left internal carotid artery stenosis, currently asymptomatic.  Given clinical stability of medical therapy and overall frailty I am not recommending evaluating for percutaneous revascularization at this time.    Subjective  Severe COPD on 2 L home O2.  Stop smoking February 2022.  Blood pressures running less than 140/90 mmHg.   Denies palpitations.  No myalgias on statin therapy, recently increased his pravastatin to 80 mg p.o. daily.  Developed abdominal discomfort with PCSK9 inhibitor, reported that his abdomen felt like cardboard for about a week after each injection.    PHYSICAL EXAMINATION:  Vitals:    07/25/24 0935   BP: 108/40   BP Location: Left arm   Patient Position: Sitting   Pulse: 79   SpO2: 90%   Weight: 62.1 kg (137 lb)   Height: 170.2 cm (67\")       General Appearance:    Alert, cooperative, no distress, appears stated age   Head:    Normocephalic, without obvious abnormality, atraumatic   Eyes:    conjunctiva/corneas clear   Nose:   Nares normal, septum midline, mucosa normal, no drainage   Throat:   Lips, teeth and gums normal   Neck:   Supple, symmetrical, trachea midline, soft left carotid bruit   Lungs:     Somewhat diminished throughout, scattered expiratory wheezing, respirations unlabored   Chest Wall:    No tenderness or deformity    Heart:    Regular rate and rhythm, S1 and S2 normal, no murmur, rub   or gallop, normal carotid impulse bilaterally without bruit.   Abdomen:     Soft, non-tender   Extremities:   Extremities normal, atraumatic, no cyanosis or edema   Pulses:   2+ and symmetric all extremities   Skin:   Skin color, texture, turgor normal, no rashes or lesions       Diagnostic Data:  Procedures  Lab Results   Component Value Date    CHLPL 148 08/10/2021    TRIG 73 08/10/2021    HDL 59 08/10/2021     Lab Results   Component Value Date    GLUCOSE 127 (H) 01/18/2019    BUN 7 (L) 01/18/2019    CREATININE 0.81 01/18/2019     01/18/2019    " K 4.3 01/18/2019     01/18/2019    CO2 29.0 01/18/2019     Lab Results   Component Value Date    HGBA1C 5.9 05/19/2021     Lab Results   Component Value Date    WBC 4.5 01/06/2021    HGB 16.4 01/06/2021    HCT 50.1 01/06/2021     01/06/2021       Allergies  Allergies   Allergen Reactions    Metoprolol Shortness Of Breath    Cefdinir Unknown - Low Severity     Hallucination, weird dreams    Cetirizine      Insomnia    Doxycycline Diarrhea     Stated he thought he was going to die. Bloody diarrhea and sores on his legs.    Fluoxetine      Dizziness and stomach pain    Oxycodone-Acetaminophen Other (See Comments)    Plavix [Clopidogrel Bisulfate] Other (See Comments)     Dyspepsia      Promethazine      itching    Rosuvastatin Other (See Comments)     Myalgias    Sulfa Antibiotics      Oral blisters, diarrhea    Varenicline Swelling     Throat swelling    Penicillins Rash       Current Medications    Current Outpatient Medications:     albuterol (PROVENTIL HFA;VENTOLIN HFA) 108 (90 Base) MCG/ACT inhaler, Inhale 2 puffs Every 4 (Four) Hours As Needed for Wheezing., Disp: , Rfl:     aspirin 81 MG EC tablet, Take 1 tablet by mouth Daily., Disp: , Rfl:     citalopram (CeleXA) 40 MG tablet, Take 1 tablet by mouth Daily., Disp: , Rfl: 5    cyclobenzaprine (FLEXERIL) 10 MG tablet, Take 1 tablet by mouth Daily., Disp: , Rfl:     ferrous sulfate 325 (65 FE) MG tablet, Take 1 tablet by mouth Daily With Breakfast., Disp: , Rfl:     fluticasone (FLONASE) 50 MCG/ACT nasal spray, 2 sprays into the nostril(s) as directed by provider As Needed., Disp: , Rfl:     Fluticasone Furoate-Vilanterol (Breo Ellipta) 100-25 MCG/INH inhaler, Inhale 1 puff Daily., Disp: , Rfl:     furosemide (LASIX) 20 MG tablet, Take 1 tablet by mouth Daily As Needed., Disp: , Rfl:     gabapentin (NEURONTIN) 600 MG tablet, Take 1 tablet by mouth 4 (Four) Times a Day., Disp: , Rfl:     ipratropium-albuterol (DUO-NEB) 0.5-2.5 mg/3 ml nebulizer, INHALE  1 THE CONTENTS OF 1 VIAL USING NEBULIZER TWO TIMES A DAY AS NEEDED, Disp: , Rfl:     nitroglycerin (NITROSTAT) 0.4 MG SL tablet, DISSOLVE 1 TABLET UNDER THE TONGUE AS NEEDED FOR CHEST PAIN. MAY REPEAT FOR 3 DOSES AT 5 MINUTE INTERVALS. IF NO RELIEF GO TO ER., Disp: 25 tablet, Rfl: 1    O2 (OXYGEN), Inhale 2 L/min As Needed., Disp: , Rfl:     omeprazole (priLOSEC) 40 MG capsule, TAKE ONE CAPSULE BY MOUTH EVERY DAY FOR STOMACH, Disp: , Rfl:     oxyCODONE (ROXICODONE) 10 MG tablet, TAKE 1 TABLET 3 TIMES A DAY BY ORAL ROUTE AS DIRECTED FOR 30 DAYS., Disp: , Rfl:     pravastatin (PRAVACHOL) 80 MG tablet, Take 1 tablet by mouth Daily., Disp: 90 tablet, Rfl: 3    tamsulosin (FLOMAX) 0.4 MG capsule 24 hr capsule, Take 1 capsule by mouth Every Night., Disp: , Rfl:     traZODone (DESYREL) 100 MG tablet, Take 1 tablet by mouth Every Night. 2 tabs at night, Disp: , Rfl:           ROS  Review of Systems   Cardiovascular:  Positive for dyspnea on exertion and irregular heartbeat. Negative for chest pain and leg swelling.   Respiratory:  Positive for cough, shortness of breath and snoring. Negative for wheezing.    Neurological:  Positive for dizziness.          SOCIAL HX  Social History     Socioeconomic History    Marital status:    Tobacco Use    Smoking status: Former     Current packs/day: 0.00     Types: Cigars, Cigarettes     Quit date: 2022     Years since quittin.4    Smokeless tobacco: Never    Tobacco comments:     3 cigars daily   Vaping Use    Vaping status: Never Used   Substance and Sexual Activity    Alcohol use: No    Drug use: No    Sexual activity: Defer       FAMILY HX  Family History   Problem Relation Age of Onset    Diabetes Father     Heart disease Father     Stroke Brother     Coronary artery disease Other     Diabetes Other     Diabetes Mother     Leukemia Sister     Heart failure Sister     Leukemia Daughter              Derek Jackson III, MD, FACC

## 2024-08-05 ENCOUNTER — HOSPITAL ENCOUNTER (OUTPATIENT)
Dept: ULTRASOUND IMAGING | Facility: HOSPITAL | Age: 74
Discharge: HOME | End: 2024-08-05
Payer: MEDICARE

## 2024-08-05 DIAGNOSIS — I65.23 BILATERAL CAROTID ARTERY STENOSIS: ICD-10-CM

## 2024-08-05 PROCEDURE — 93880 EXTRACRANIAL BILAT STUDY: CPT

## 2025-01-23 ENCOUNTER — OFFICE VISIT (OUTPATIENT)
Dept: CARDIOLOGY | Facility: CLINIC | Age: 75
End: 2025-01-23
Payer: MEDICARE

## 2025-01-23 VITALS
SYSTOLIC BLOOD PRESSURE: 114 MMHG | OXYGEN SATURATION: 93 % | HEART RATE: 97 BPM | WEIGHT: 10.2 LBS | BODY MASS INDEX: 1.6 KG/M2 | HEIGHT: 67 IN | DIASTOLIC BLOOD PRESSURE: 56 MMHG

## 2025-01-23 DIAGNOSIS — I65.23 BILATERAL CAROTID ARTERY STENOSIS: ICD-10-CM

## 2025-01-23 DIAGNOSIS — E78.00 HYPERCHOLESTEROLEMIA: ICD-10-CM

## 2025-01-23 DIAGNOSIS — I25.10 CORONARY ARTERY DISEASE INVOLVING NATIVE CORONARY ARTERY OF NATIVE HEART WITHOUT ANGINA PECTORIS: Primary | ICD-10-CM

## 2025-01-23 RX ORDER — BUSPIRONE HYDROCHLORIDE 10 MG/1
10 TABLET ORAL 3 TIMES DAILY PRN
COMMUNITY

## 2025-01-23 NOTE — PROGRESS NOTES
[facility]  Office visit  Geronimo Pizano  1950  [phone]    VISIT DATE:  1/23/2025      PCP: Johana Garcia APRN  [address] [place] [id number]    CC:  Chief Complaint   Patient presents with    Coronary Artery Disease    Shortness of Breath       PROBLEM LIST:  Coronary artery disease:  Remote bare-metal stent, April 1999.  Recurrent stenting of left anterior descending coronary artery in 2003.  Cardiac catheterization in August 2007, revealing normal EF with 25% in-stent stenosis in the left anterior descending coronary artery and 50% stenosis in the first obtuse marginal branch of the circumflex.  Left heart catheterization, 06/16/2009: 25% in-stent restenosis of the LAD, normal EF.  Normal nuclear perfusion study, 03/19/2015, Nora Guzmán MD, revealing no evidence of reducible ischemia, normal LV systolic function and wall motion.   Echo 10/2018       -Ejection fraction is visually estimated to be 60-65 %.        -Mild concentric left ventricular hypertrophy is present.        -Diastolic filling parameters suggests grade I diastolic dysfunction .        -Mild-to-moderate aortic regurgitation is present.      G. cardiac catheterization January 18, 2019  90% proximal LAD stenosis treated with 3.0 x 12 mm Debbie drug-eluting stent  Patent proximal and mid LAD stents.  90% distal LAD stenosis  30-40% distal left main stenosis  Elevated LVEDP  No aortic stenosis  Aortic insufficiency, mild-moderate  Carotid artery disease:  Asymptomatic.  Carotid duplex, 08/11/2012: 40% to 60% stenosis of the left ICA, less than 40% of the right ICA.  Carotid ultrasound, 10/04/2013: No change from previous exams, with 30% stenosis on the right carotid artery and 50% on the left.  March 2018:1. Right internal carotid artery estimated diameter reduction:  20-49 %.  2. Left internal carotid artery estimated diameter reduction:  20-49 %. However, the ICA:ECA ratio is elevated  suggesting that  this may represent a 50-69% stenosis similar to that seen on the  study of 12/27/2016.  Palpitations: Holter January 2018-frequent premature atrial contractions and premature ventricular contractions.  Hypercholesterolemia.  Chronic back pain.  Recent smoking cessation.  Mild anxiety.  Acid reflux.  Chronic obstructive pulmonary disease.  Surgical history: Remote appendectomy.    June 2020 bilateral carotid duplex  1. Right internal carotid artery estimated diameter reduction: 50-69 %.   2. Left internal carotid artery estimated diameter reduction: 50-69 %.   3. Vertebral arteries demonstrate bilateral antegrade flow.     October 2021 bilateral carotid duplex  1. Right internal carotid artery estimated diameter reduction: 50-69 %.   2. Left internal carotid artery estimated diameter reduction: 70-99 %.   3. Vertebral arteries demonstrate bilateral antegrade flow.     May 2023 bilateral carotid duplex  1. Right internal carotid artery estimated diameter reduction: 50-69 %.   2. Left internal carotid artery estimated diameter reduction: 70-99 %.   3. Vertebral arteries demonstrate bilateral antegrade flow.     August 2024 carotid duplex  1.  Estimated diameter reduction of the right internal carotid artery is at the   upper range of 20 to 49%   2.  Severe left internal carotid artery stenosis, greater than 90% diameter, peak systolic   velocity of 409 cm/s with end-diastolic velocity 33 cm/s.   3.  Vertebral arteries bilaterally with antegrade flow.     ASSESSMENT:   Diagnosis Plan   1. Coronary artery disease involving native coronary artery of native heart without angina pectoris        2. Bilateral carotid artery stenosis        3. Hypercholesterolemia              PLAN:  Coronary artery disease:  Currently asymptomatic.  Continue as needed sublingual nitroglycerin.    Previously refused noninvasive evaluation when he was having atypical chest pain.  Previously intolerant to high intensity statin  "therapy to include atorvastatin, simvastatin and rosuvastatin.  Continue current medical therapy.    Hypercholesterolemia: Goal LDL less than 70.  Continue pravastatin 80 mg p.o. daily.  Previously intolerant to higher intensity statin therapy.  Intolerant to PCSK9 inhibitor.    Peripheral vascular disease: Continue current medical therapy.  Significant left internal carotid artery stenosis, currently asymptomatic.  Given clinical stability of medical therapy and overall frailty I am not recommending evaluating for percutaneous revascularization at this time.    Subjective  Severe COPD on 2 L home O2.  Stop smoking February 2022.  Blood pressures running less than 140/90 mmHg.   Denies palpitations.  No myalgias on statin therapy, recently increased his pravastatin to 80 mg p.o. daily.  Developed abdominal discomfort with PCSK9 inhibitor, reported that his abdomen felt like cardboard for about a week after each injection.  No symptoms concerning for TIA or CVA.    PHYSICAL EXAMINATION:  Vitals:    01/23/25 0938   BP: 114/56   BP Location: Right arm   Patient Position: Sitting   Pulse: 97   SpO2: 93%   Weight: 4.627 kg (10 lb 3.2 oz)   Height: 170.2 cm (67\")         General Appearance:    Alert, cooperative, no distress, appears stated age   Head:    Normocephalic, without obvious abnormality, atraumatic   Eyes:    conjunctiva/corneas clear   Nose:   Nares normal, septum midline, mucosa normal, no drainage   Throat:   Lips, teeth and gums normal   Neck:   Supple, symmetrical, trachea midline, soft left carotid bruit   Lungs:     Somewhat diminished throughout, scattered expiratory wheezing, respirations unlabored   Chest Wall:    No tenderness or deformity    Heart:    Regular rate and rhythm, S1 and S2 normal, no murmur, rub   or gallop, normal carotid impulse bilaterally without bruit.   Abdomen:     Soft, non-tender   Extremities:   Extremities normal, atraumatic, no cyanosis or edema   Pulses:   2+ and symmetric " all extremities   Skin:   Skin color, texture, turgor normal, no rashes or lesions       Diagnostic Data:  Procedures  Lab Results   Component Value Date    CHLPL 148 08/10/2021    TRIG 73 08/10/2021    HDL 59 08/10/2021     Lab Results   Component Value Date    GLUCOSE 127 (H) 01/18/2019    BUN 7 (L) 01/18/2019    CREATININE 0.81 01/18/2019     01/18/2019    K 4.3 01/18/2019     01/18/2019    CO2 29.0 01/18/2019     Lab Results   Component Value Date    HGBA1C 5.9 05/19/2021     Lab Results   Component Value Date    WBC 4.5 01/06/2021    HGB 16.4 01/06/2021    HCT 50.1 01/06/2021     01/06/2021       Allergies  Allergies   Allergen Reactions    Metoprolol Shortness Of Breath    Cefdinir Unknown - Low Severity     Hallucination, weird dreams    Cetirizine      Insomnia    Doxycycline Diarrhea     Stated he thought he was going to die. Bloody diarrhea and sores on his legs.    Fluoxetine      Dizziness and stomach pain    Oxycodone-Acetaminophen Other (See Comments)    Plavix [Clopidogrel Bisulfate] Other (See Comments)     Dyspepsia      Promethazine      itching    Rosuvastatin Other (See Comments)     Myalgias    Sulfa Antibiotics      Oral blisters, diarrhea    Varenicline Swelling     Throat swelling    Penicillins Rash       Current Medications    Current Outpatient Medications:     albuterol (PROVENTIL HFA;VENTOLIN HFA) 108 (90 Base) MCG/ACT inhaler, Inhale 2 puffs Every 4 (Four) Hours As Needed for Wheezing., Disp: , Rfl:     busPIRone (BUSPAR) 10 MG tablet, Take 1 tablet by mouth 3 (Three) Times a Day As Needed., Disp: , Rfl:     citalopram (CeleXA) 40 MG tablet, Take 1 tablet by mouth Daily., Disp: , Rfl: 5    cyclobenzaprine (FLEXERIL) 10 MG tablet, Take 1 tablet by mouth Daily. (Patient taking differently: Take 0.5 tablets by mouth Daily.), Disp: , Rfl:     ferrous sulfate 325 (65 FE) MG tablet, Take 2 tablets by mouth Daily., Disp: , Rfl:     fluticasone (FLONASE) 50 MCG/ACT nasal  spray, Administer 2 sprays into the nostril(s) as directed by provider As Needed., Disp: , Rfl:     furosemide (LASIX) 20 MG tablet, Take 1 tablet by mouth As Needed., Disp: , Rfl:     gabapentin (NEURONTIN) 600 MG tablet, Take 1 tablet by mouth 4 (Four) Times a Day., Disp: , Rfl:     ipratropium-albuterol (DUO-NEB) 0.5-2.5 mg/3 ml nebulizer, INHALE 1 THE CONTENTS OF 1 VIAL USING NEBULIZER TWO TIMES A DAY AS NEEDED, Disp: , Rfl:     O2 (OXYGEN), Inhale 2 L/min As Needed. (Patient taking differently: Inhale 2-3 L/min As Needed.), Disp: , Rfl:     omeprazole (priLOSEC) 40 MG capsule, TAKE ONE CAPSULE BY MOUTH EVERY DAY FOR STOMACH, Disp: , Rfl:     oxyCODONE (ROXICODONE) 10 MG tablet, TAKE 1 TABLET 3 TIMES A DAY BY ORAL ROUTE AS DIRECTED FOR 30 DAYS., Disp: , Rfl:     pravastatin (PRAVACHOL) 80 MG tablet, Take 1 tablet by mouth Daily., Disp: 90 tablet, Rfl: 3    tamsulosin (FLOMAX) 0.4 MG capsule 24 hr capsule, Take 1 capsule by mouth Every Night., Disp: , Rfl:     traZODone (DESYREL) 100 MG tablet, Take 1 tablet by mouth Every Night. 2 tabs at night, Disp: , Rfl:     aspirin 81 MG EC tablet, Take 1 tablet by mouth Daily. (Patient not taking: Reported on 1/23/2025), Disp: , Rfl:     Fluticasone Furoate-Vilanterol (Breo Ellipta) 100-25 MCG/INH inhaler, Inhale 1 puff Daily. (Patient not taking: Reported on 1/23/2025), Disp: , Rfl:     nitroglycerin (NITROSTAT) 0.4 MG SL tablet, DISSOLVE 1 TABLET UNDER THE TONGUE AS NEEDED FOR CHEST PAIN. MAY REPEAT FOR 3 DOSES AT 5 MINUTE INTERVALS. IF NO RELIEF GO TO ER. (Patient not taking: Reported on 1/23/2025), Disp: 25 tablet, Rfl: 1          ROS  Review of Systems   Cardiovascular:  Positive for dyspnea on exertion and irregular heartbeat. Negative for chest pain and leg swelling.   Respiratory:  Positive for cough, shortness of breath and snoring. Negative for wheezing.    Neurological:  Positive for dizziness.          SOCIAL HX  Social History     Socioeconomic History     Marital status:    Tobacco Use    Smoking status: Former     Current packs/day: 0.00     Types: Cigars, Cigarettes     Quit date: 2022     Years since quittin.9    Smokeless tobacco: Never    Tobacco comments:     3 cigars daily   Vaping Use    Vaping status: Never Used   Substance and Sexual Activity    Alcohol use: No    Drug use: No    Sexual activity: Defer       FAMILY HX  Family History   Problem Relation Age of Onset    Diabetes Father     Heart disease Father     Stroke Brother     Coronary artery disease Other     Diabetes Other     Diabetes Mother     Leukemia Sister     Heart failure Sister     Leukemia Daughter              Derek Jackson III, MD, FACC

## 2025-07-24 ENCOUNTER — OFFICE VISIT (OUTPATIENT)
Dept: CARDIOLOGY | Facility: CLINIC | Age: 75
End: 2025-07-24
Payer: MEDICARE

## 2025-07-24 ENCOUNTER — HOSPITAL ENCOUNTER (OUTPATIENT)
Facility: HOSPITAL | Age: 75
Discharge: HOME | End: 2025-07-24
Payer: MEDICARE

## 2025-07-24 VITALS
BODY MASS INDEX: 22.82 KG/M2 | SYSTOLIC BLOOD PRESSURE: 144 MMHG | WEIGHT: 150.6 LBS | OXYGEN SATURATION: 95 % | DIASTOLIC BLOOD PRESSURE: 70 MMHG | HEIGHT: 68 IN | HEART RATE: 78 BPM

## 2025-07-24 DIAGNOSIS — E78.00 HYPERCHOLESTEROLEMIA: ICD-10-CM

## 2025-07-24 DIAGNOSIS — I65.23 BILATERAL CAROTID ARTERY STENOSIS: ICD-10-CM

## 2025-07-24 DIAGNOSIS — I25.10 CORONARY ARTERY DISEASE INVOLVING NATIVE CORONARY ARTERY OF NATIVE HEART WITHOUT ANGINA PECTORIS: Primary | ICD-10-CM

## 2025-07-24 PROCEDURE — 93000 ELECTROCARDIOGRAM COMPLETE: CPT | Performed by: INTERNAL MEDICINE

## 2025-07-24 PROCEDURE — 93005 ELECTROCARDIOGRAM TRACING: CPT

## 2025-07-24 PROCEDURE — 99214 OFFICE O/P EST MOD 30 MIN: CPT | Performed by: INTERNAL MEDICINE

## 2025-07-24 RX ORDER — CLOPIDOGREL BISULFATE 75 MG/1
75 TABLET ORAL DAILY
Qty: 90 TABLET | Refills: 3 | Status: SHIPPED | OUTPATIENT
Start: 2025-07-24

## 2025-07-24 NOTE — PROGRESS NOTES
[facility]  Office visit  Geronimo Pizano  1950  [phone]    VISIT DATE:  7/24/2025      PCP: Johana Garcia APRN  [address] [place] [id number]    CC:  Chief Complaint   Patient presents with   • Coronary Artery Disease   • Shortness of Breath       PROBLEM LIST:  Coronary artery disease:  Remote bare-metal stent, April 1999.  Recurrent stenting of left anterior descending coronary artery in 2003.  Cardiac catheterization in August 2007, revealing normal EF with 25% in-stent stenosis in the left anterior descending coronary artery and 50% stenosis in the first obtuse marginal branch of the circumflex.  Left heart catheterization, 06/16/2009: 25% in-stent restenosis of the LAD, normal EF.  Normal nuclear perfusion study, 03/19/2015, Nora Guzmán MD, revealing no evidence of reducible ischemia, normal LV systolic function and wall motion.   Echo 10/2018       -Ejection fraction is visually estimated to be 60-65 %.        -Mild concentric left ventricular hypertrophy is present.        -Diastolic filling parameters suggests grade I diastolic dysfunction .        -Mild-to-moderate aortic regurgitation is present.      G. cardiac catheterization January 18, 2019  90% proximal LAD stenosis treated with 3.0 x 12 mm Debbie drug-eluting stent  Patent proximal and mid LAD stents.  90% distal LAD stenosis  30-40% distal left main stenosis  Elevated LVEDP  No aortic stenosis  Aortic insufficiency, mild-moderate  Carotid artery disease:  Asymptomatic.  Carotid duplex, 08/11/2012: 40% to 60% stenosis of the left ICA, less than 40% of the right ICA.  Carotid ultrasound, 10/04/2013: No change from previous exams, with 30% stenosis on the right carotid artery and 50% on the left.  March 2018:1. Right internal carotid artery estimated diameter reduction:  20-49 %.  2. Left internal carotid artery estimated diameter reduction:  20-49 %. However, the ICA:ECA ratio is elevated  suggesting that  this may represent a 50-69% stenosis similar to that seen on the  study of 12/27/2016.  Palpitations: Holter January 2018-frequent premature atrial contractions and premature ventricular contractions.  Hypercholesterolemia.  Chronic back pain.  Recent smoking cessation.  Mild anxiety.  Acid reflux.  Chronic obstructive pulmonary disease.  Surgical history: Remote appendectomy.    June 2020 bilateral carotid duplex  1. Right internal carotid artery estimated diameter reduction: 50-69 %.   2. Left internal carotid artery estimated diameter reduction: 50-69 %.   3. Vertebral arteries demonstrate bilateral antegrade flow.     October 2021 bilateral carotid duplex  1. Right internal carotid artery estimated diameter reduction: 50-69 %.   2. Left internal carotid artery estimated diameter reduction: 70-99 %.   3. Vertebral arteries demonstrate bilateral antegrade flow.     May 2023 bilateral carotid duplex  1. Right internal carotid artery estimated diameter reduction: 50-69 %.   2. Left internal carotid artery estimated diameter reduction: 70-99 %.   3. Vertebral arteries demonstrate bilateral antegrade flow.     August 2024 carotid duplex  1.  Estimated diameter reduction of the right internal carotid artery is at the   upper range of 20 to 49%   2.  Severe left internal carotid artery stenosis, greater than 90% diameter, peak systolic   velocity of 409 cm/s with end-diastolic velocity 33 cm/s.   3.  Vertebral arteries bilaterally with antegrade flow.     ASSESSMENT:   Diagnosis Plan   1. Coronary artery disease involving native coronary artery of native heart without angina pectoris        2. Bilateral carotid artery stenosis        3. Hypercholesterolemia              PLAN:  Coronary artery disease:  Currently asymptomatic.  Continue as needed sublingual nitroglycerin.    Previously refused noninvasive evaluation when he was having atypical chest pain.  Previously intolerant to high intensity statin  "therapy to include atorvastatin, simvastatin and rosuvastatin.  Continue current medical therapy.    Hypercholesterolemia: Goal LDL less than 70.  Continue pravastatin 80 mg p.o. daily.  Previously intolerant to higher intensity statin therapy.  Intolerant to PCSK9 inhibitor.    Peripheral vascular disease: Continue current medical therapy.  Significant left internal carotid artery stenosis, currently asymptomatic.  Given clinical stability on medical therapy and overall frailty I am not recommending evaluating for percutaneous revascularization at this time.    Repeat trial of clopidogrel 75 mg p.o. daily.    Subjective  Severe COPD on 2 L home O2.  Stop smoking February 2022.  Blood pressures running less than 140/90 mmHg.   Denies palpitations..  No symptoms concerning for TIA or CVA.  Stopped taking aspirin due to abdominal discomfort.    PHYSICAL EXAMINATION:  Vitals:    07/24/25 1452   BP: 144/70   BP Location: Left arm   Patient Position: Sitting   Pulse: 78   SpO2: 95%   Weight: 68.3 kg (150 lb 9.6 oz)   Height: 172.7 cm (68\")           General Appearance:    Alert, cooperative, no distress, appears stated age   Head:    Normocephalic, without obvious abnormality, atraumatic   Lungs:     Somewhat diminished throughout, scattered expiratory wheezing, respirations unlabored   Chest Wall:    No tenderness or deformity    Heart:    Regular rate and rhythm, S1 and S2 normal, no murmur, rub   or gallop, normal carotid impulse bilaterally without bruit.   Extremities:   Extremities normal, atraumatic, no cyanosis or edema   Pulses:   2+ and symmetric all extremities   Skin:   Skin color, texture, turgor normal, no rashes or lesions       Diagnostic Data:    ECG 12 Lead    Date/Time: 7/24/2025 3:06 PM  Performed by: Derek Jackson III, MD    Authorized by: Derek Jackson III, MD  Comparison: compared with previous ECG from 9/23/2021  Similar to previous ECG  Rhythm: sinus rhythm  Conduction: right bundle branch " block    Clinical impression: abnormal EKG    Lab Results   Component Value Date    CHLPL 148 08/10/2021    TRIG 73 08/10/2021    HDL 59 08/10/2021     Lab Results   Component Value Date    GLUCOSE 127 (H) 01/18/2019    BUN 7 (L) 01/18/2019    CREATININE 0.81 01/18/2019     01/18/2019    K 4.3 01/18/2019     01/18/2019    CO2 29.0 01/18/2019     Lab Results   Component Value Date    HGBA1C 5.9 05/19/2021     Lab Results   Component Value Date    WBC 4.5 01/06/2021    HGB 16.4 01/06/2021    HCT 50.1 01/06/2021     01/06/2021       Allergies  Allergies   Allergen Reactions   • Metoprolol Shortness Of Breath   • Cefdinir Unknown - Low Severity     Hallucination, weird dreams   • Cetirizine      Insomnia   • Doxycycline Diarrhea     Stated he thought he was going to die. Bloody diarrhea and sores on his legs.   • Fluoxetine      Dizziness and stomach pain   • Oxycodone-Acetaminophen Other (See Comments)   • Plavix [Clopidogrel Bisulfate] Other (See Comments)     Dyspepsia     • Promethazine      itching   • Rosuvastatin Other (See Comments)     Myalgias   • Sulfa Antibiotics      Oral blisters, diarrhea   • Varenicline Swelling     Throat swelling   • Penicillins Rash       Current Medications    Current Outpatient Medications:   •  albuterol (PROVENTIL HFA;VENTOLIN HFA) 108 (90 Base) MCG/ACT inhaler, Inhale 2 puffs Every 4 (Four) Hours As Needed for Wheezing., Disp: , Rfl:   •  busPIRone (BUSPAR) 10 MG tablet, Take 1 tablet by mouth 3 (Three) Times a Day As Needed., Disp: , Rfl:   •  citalopram (CeleXA) 40 MG tablet, Take 1 tablet by mouth Daily., Disp: , Rfl: 5  •  cyclobenzaprine (FLEXERIL) 10 MG tablet, Take 1 tablet by mouth Daily. (Patient taking differently: Take 0.5 tablets by mouth Daily.), Disp: , Rfl:   •  ferrous sulfate 325 (65 FE) MG tablet, Take 2 tablets by mouth Daily., Disp: , Rfl:   •  fluticasone (FLONASE) 50 MCG/ACT nasal spray, Administer 2 sprays into the nostril(s) as directed  by provider As Needed., Disp: , Rfl:   •  Fluticasone Furoate-Vilanterol (Breo Ellipta) 100-25 MCG/INH inhaler, Inhale 1 puff Daily., Disp: , Rfl:   •  furosemide (LASIX) 20 MG tablet, Take 1 tablet by mouth As Needed., Disp: , Rfl:   •  gabapentin (NEURONTIN) 600 MG tablet, Take 1 tablet by mouth 4 (Four) Times a Day., Disp: , Rfl:   •  ipratropium-albuterol (DUO-NEB) 0.5-2.5 mg/3 ml nebulizer, INHALE 1 THE CONTENTS OF 1 VIAL USING NEBULIZER TWO TIMES A DAY AS NEEDED, Disp: , Rfl:   •  O2 (OXYGEN), Inhale 2 L/min As Needed., Disp: , Rfl:   •  omeprazole (priLOSEC) 40 MG capsule, TAKE ONE CAPSULE BY MOUTH EVERY DAY FOR STOMACH, Disp: , Rfl:   •  oxyCODONE (ROXICODONE) 10 MG tablet, TAKE 1 TABLET 3 TIMES A DAY BY ORAL ROUTE AS DIRECTED FOR 30 DAYS., Disp: , Rfl:   •  pravastatin (PRAVACHOL) 80 MG tablet, Take 1 tablet by mouth Daily., Disp: 90 tablet, Rfl: 3  •  tamsulosin (FLOMAX) 0.4 MG capsule 24 hr capsule, Take 1 capsule by mouth Every Night., Disp: , Rfl:   •  traZODone (DESYREL) 100 MG tablet, Take 1 tablet by mouth Every Night. 2 tabs at night, Disp: , Rfl:   •  clopidogrel (PLAVIX) 75 MG tablet, Take 1 tablet by mouth Daily., Disp: 90 tablet, Rfl: 3  •  nitroglycerin (NITROSTAT) 0.4 MG SL tablet, DISSOLVE 1 TABLET UNDER THE TONGUE AS NEEDED FOR CHEST PAIN. MAY REPEAT FOR 3 DOSES AT 5 MINUTE INTERVALS. IF NO RELIEF GO TO ER. (Patient not taking: Reported on 7/24/2025), Disp: 25 tablet, Rfl: 1          ROS  ROS       SOCIAL HX  Social History     Socioeconomic History   • Marital status:    Tobacco Use   • Smoking status: Former     Current packs/day: 0.00     Types: Cigars, Cigarettes     Quit date: 02/2022     Years since quitting: 3.4   • Smokeless tobacco: Never   • Tobacco comments:     3 cigars daily   Vaping Use   • Vaping status: Never Used   Substance and Sexual Activity   • Alcohol use: No   • Drug use: No   • Sexual activity: Defer       FAMILY HX  Family History   Problem Relation Age of  Onset   • Diabetes Father    • Heart disease Father    • Stroke Brother    • Coronary artery disease Other    • Diabetes Other    • Diabetes Mother    • Leukemia Sister    • Heart failure Sister    • Leukemia Daughter        I confirm that all copied/forwarded documentation in this record has been carefully reviewed, updated as necessary, and accurately reflects the patient's current status and plan of care.        Derek Jackson III, MD, [facility]

## (undated) DEVICE — CATH DIAG EXPO .045 FL3.5 5F 100CM

## (undated) DEVICE — MODEL AT P65, P/N 701554-001KIT CONTENTS: HAND CONTROLLER, 3-WAY HIGH-PRESSURE STOPCOCK WITH ROTATING END AND PREMIUM HIGH-PRESSURE TUBING: Brand: ANGIOTOUCH® KIT

## (undated) DEVICE — PK CATH CARD 10

## (undated) DEVICE — MODEL BT2000 P/N 700287-012KIT CONTENTS: MANIFOLD WITH SALINE AND CONTRAST PORTS, SALINE TUBING WITH SPIKE AND HAND SYRINGE, TRANSDUCER: Brand: BT2000 AUTOMATED MANIFOLD KIT

## (undated) DEVICE — GW LUGE .014 182 CM

## (undated) DEVICE — NC TREK CORONARY DILATATION CATHETER 3.0 MM X 15 MM / RAPID-EXCHANGE: Brand: NC TREK

## (undated) DEVICE — CATH DIAG EXPO M/ PK 5F FL4/FR4 PIG

## (undated) DEVICE — DEV INFL MONARCH 25W

## (undated) DEVICE — GUIDE CATHETER: Brand: MACH1™

## (undated) DEVICE — GW FC FLOP/TP .035 260CM 3MM

## (undated) DEVICE — GLIDESHEATH BASIC HYDROPHILIC COATED INTRODUCER SHEATH: Brand: GLIDESHEATH